# Patient Record
Sex: FEMALE | Race: ASIAN | NOT HISPANIC OR LATINO | URBAN - METROPOLITAN AREA
[De-identification: names, ages, dates, MRNs, and addresses within clinical notes are randomized per-mention and may not be internally consistent; named-entity substitution may affect disease eponyms.]

---

## 2020-10-30 ENCOUNTER — INPATIENT (INPATIENT)
Facility: HOSPITAL | Age: 60
LOS: 4 days | Discharge: HOME | DRG: 375 | End: 2020-11-04
Attending: INTERNAL MEDICINE | Admitting: INTERNAL MEDICINE
Payer: MEDICAID

## 2020-10-30 VITALS
DIASTOLIC BLOOD PRESSURE: 75 MMHG | HEART RATE: 110 BPM | TEMPERATURE: 98 F | SYSTOLIC BLOOD PRESSURE: 120 MMHG | OXYGEN SATURATION: 98 % | WEIGHT: 158.73 LBS | RESPIRATION RATE: 16 BRPM

## 2020-10-30 DIAGNOSIS — C78.6 SECONDARY MALIGNANT NEOPLASM OF RETROPERITONEUM AND PERITONEUM: ICD-10-CM

## 2020-10-30 DIAGNOSIS — Z29.9 ENCOUNTER FOR PROPHYLACTIC MEASURES, UNSPECIFIED: ICD-10-CM

## 2020-10-30 DIAGNOSIS — E03.9 HYPOTHYROIDISM, UNSPECIFIED: ICD-10-CM

## 2020-10-30 DIAGNOSIS — C78.7 SECONDARY MALIGNANT NEOPLASM OF LIVER AND INTRAHEPATIC BILE DUCT: ICD-10-CM

## 2020-10-30 DIAGNOSIS — R18.8 OTHER ASCITES: ICD-10-CM

## 2020-10-30 DIAGNOSIS — I10 ESSENTIAL (PRIMARY) HYPERTENSION: ICD-10-CM

## 2020-10-30 LAB
ACETONE SERPL-MCNC: NEGATIVE — SIGNIFICANT CHANGE UP
ALBUMIN SERPL ELPH-MCNC: 2.8 G/DL — LOW (ref 3.5–5)
ALP SERPL-CCNC: 158 U/L — HIGH (ref 40–120)
ALT FLD-CCNC: 51 U/L DA — SIGNIFICANT CHANGE UP (ref 10–60)
ANION GAP SERPL CALC-SCNC: 6 MMOL/L — SIGNIFICANT CHANGE UP (ref 5–17)
APPEARANCE UR: CLEAR — SIGNIFICANT CHANGE UP
AST SERPL-CCNC: 36 U/L — SIGNIFICANT CHANGE UP (ref 10–40)
BASOPHILS # BLD AUTO: 0.04 K/UL — SIGNIFICANT CHANGE UP (ref 0–0.2)
BASOPHILS NFR BLD AUTO: 0.5 % — SIGNIFICANT CHANGE UP (ref 0–2)
BILIRUB SERPL-MCNC: 0.2 MG/DL — SIGNIFICANT CHANGE UP (ref 0.2–1.2)
BILIRUB UR-MCNC: NEGATIVE — SIGNIFICANT CHANGE UP
BUN SERPL-MCNC: 10 MG/DL — SIGNIFICANT CHANGE UP (ref 7–18)
CALCIUM SERPL-MCNC: 9.1 MG/DL — SIGNIFICANT CHANGE UP (ref 8.4–10.5)
CHLORIDE SERPL-SCNC: 102 MMOL/L — SIGNIFICANT CHANGE UP (ref 96–108)
CO2 SERPL-SCNC: 29 MMOL/L — SIGNIFICANT CHANGE UP (ref 22–31)
COLOR SPEC: YELLOW — SIGNIFICANT CHANGE UP
CREAT SERPL-MCNC: 0.72 MG/DL — SIGNIFICANT CHANGE UP (ref 0.5–1.3)
DIFF PNL FLD: NEGATIVE — SIGNIFICANT CHANGE UP
EOSINOPHIL # BLD AUTO: 0.08 K/UL — SIGNIFICANT CHANGE UP (ref 0–0.5)
EOSINOPHIL NFR BLD AUTO: 1 % — SIGNIFICANT CHANGE UP (ref 0–6)
GLUCOSE SERPL-MCNC: 105 MG/DL — HIGH (ref 70–99)
GLUCOSE UR QL: NEGATIVE — SIGNIFICANT CHANGE UP
HCT VFR BLD CALC: 35.1 % — SIGNIFICANT CHANGE UP (ref 34.5–45)
HGB BLD-MCNC: 11 G/DL — LOW (ref 11.5–15.5)
IMM GRANULOCYTES NFR BLD AUTO: 0.4 % — SIGNIFICANT CHANGE UP (ref 0–1.5)
KETONES UR-MCNC: NEGATIVE — SIGNIFICANT CHANGE UP
LEUKOCYTE ESTERASE UR-ACNC: NEGATIVE — SIGNIFICANT CHANGE UP
LIDOCAIN IGE QN: 127 U/L — SIGNIFICANT CHANGE UP (ref 73–393)
LYMPHOCYTES # BLD AUTO: 1.35 K/UL — SIGNIFICANT CHANGE UP (ref 1–3.3)
LYMPHOCYTES # BLD AUTO: 16.1 % — SIGNIFICANT CHANGE UP (ref 13–44)
MAGNESIUM SERPL-MCNC: 2.6 MG/DL — SIGNIFICANT CHANGE UP (ref 1.6–2.6)
MCHC RBC-ENTMCNC: 28.1 PG — SIGNIFICANT CHANGE UP (ref 27–34)
MCHC RBC-ENTMCNC: 31.3 GM/DL — LOW (ref 32–36)
MCV RBC AUTO: 89.8 FL — SIGNIFICANT CHANGE UP (ref 80–100)
MONOCYTES # BLD AUTO: 0.84 K/UL — SIGNIFICANT CHANGE UP (ref 0–0.9)
MONOCYTES NFR BLD AUTO: 10 % — SIGNIFICANT CHANGE UP (ref 2–14)
NEUTROPHILS # BLD AUTO: 6.05 K/UL — SIGNIFICANT CHANGE UP (ref 1.8–7.4)
NEUTROPHILS NFR BLD AUTO: 72 % — SIGNIFICANT CHANGE UP (ref 43–77)
NITRITE UR-MCNC: NEGATIVE — SIGNIFICANT CHANGE UP
NRBC # BLD: 0 /100 WBCS — SIGNIFICANT CHANGE UP (ref 0–0)
PH UR: 7 — SIGNIFICANT CHANGE UP (ref 5–8)
PLATELET # BLD AUTO: 384 K/UL — SIGNIFICANT CHANGE UP (ref 150–400)
POTASSIUM SERPL-MCNC: 4.1 MMOL/L — SIGNIFICANT CHANGE UP (ref 3.5–5.3)
POTASSIUM SERPL-SCNC: 4.1 MMOL/L — SIGNIFICANT CHANGE UP (ref 3.5–5.3)
PROT SERPL-MCNC: 7.8 G/DL — SIGNIFICANT CHANGE UP (ref 6–8.3)
PROT UR-MCNC: NEGATIVE — SIGNIFICANT CHANGE UP
RBC # BLD: 3.91 M/UL — SIGNIFICANT CHANGE UP (ref 3.8–5.2)
RBC # FLD: 11.7 % — SIGNIFICANT CHANGE UP (ref 10.3–14.5)
SARS-COV-2 RNA SPEC QL NAA+PROBE: SIGNIFICANT CHANGE UP
SODIUM SERPL-SCNC: 137 MMOL/L — SIGNIFICANT CHANGE UP (ref 135–145)
SP GR SPEC: 1 — LOW (ref 1.01–1.02)
TROPONIN I SERPL-MCNC: <0.015 NG/ML — SIGNIFICANT CHANGE UP (ref 0–0.04)
UROBILINOGEN FLD QL: NEGATIVE — SIGNIFICANT CHANGE UP
WBC # BLD: 8.39 K/UL — SIGNIFICANT CHANGE UP (ref 3.8–10.5)
WBC # FLD AUTO: 8.39 K/UL — SIGNIFICANT CHANGE UP (ref 3.8–10.5)

## 2020-10-30 PROCEDURE — 99285 EMERGENCY DEPT VISIT HI MDM: CPT

## 2020-10-30 PROCEDURE — 76705 ECHO EXAM OF ABDOMEN: CPT | Mod: 26

## 2020-10-30 PROCEDURE — 74177 CT ABD & PELVIS W/CONTRAST: CPT | Mod: 26

## 2020-10-30 RX ORDER — ENOXAPARIN SODIUM 100 MG/ML
40 INJECTION SUBCUTANEOUS DAILY
Refills: 0 | Status: DISCONTINUED | OUTPATIENT
Start: 2020-10-30 | End: 2020-11-04

## 2020-10-30 RX ORDER — IOHEXOL 300 MG/ML
30 INJECTION, SOLUTION INTRAVENOUS ONCE
Refills: 0 | Status: COMPLETED | OUTPATIENT
Start: 2020-10-30 | End: 2020-10-30

## 2020-10-30 RX ORDER — SODIUM CHLORIDE 9 MG/ML
1000 INJECTION INTRAMUSCULAR; INTRAVENOUS; SUBCUTANEOUS
Refills: 0 | Status: DISCONTINUED | OUTPATIENT
Start: 2020-10-30 | End: 2020-10-30

## 2020-10-30 RX ORDER — LEVOTHYROXINE SODIUM 125 MCG
25 TABLET ORAL DAILY
Refills: 0 | Status: DISCONTINUED | OUTPATIENT
Start: 2020-10-30 | End: 2020-11-04

## 2020-10-30 RX ORDER — ATORVASTATIN CALCIUM 80 MG/1
20 TABLET, FILM COATED ORAL AT BEDTIME
Refills: 0 | Status: DISCONTINUED | OUTPATIENT
Start: 2020-10-30 | End: 2020-11-04

## 2020-10-30 RX ORDER — ACETAMINOPHEN 500 MG
650 TABLET ORAL EVERY 6 HOURS
Refills: 0 | Status: DISCONTINUED | OUTPATIENT
Start: 2020-10-30 | End: 2020-11-04

## 2020-10-30 RX ORDER — LOSARTAN POTASSIUM 100 MG/1
25 TABLET, FILM COATED ORAL DAILY
Refills: 0 | Status: DISCONTINUED | OUTPATIENT
Start: 2020-10-30 | End: 2020-11-04

## 2020-10-30 RX ORDER — FAMOTIDINE 10 MG/ML
20 INJECTION INTRAVENOUS ONCE
Refills: 0 | Status: COMPLETED | OUTPATIENT
Start: 2020-10-30 | End: 2020-10-30

## 2020-10-30 RX ADMIN — IOHEXOL 30 MILLILITER(S): 300 INJECTION, SOLUTION INTRAVENOUS at 14:10

## 2020-10-30 RX ADMIN — ATORVASTATIN CALCIUM 20 MILLIGRAM(S): 80 TABLET, FILM COATED ORAL at 22:45

## 2020-10-30 RX ADMIN — SODIUM CHLORIDE 150 MILLILITER(S): 9 INJECTION INTRAMUSCULAR; INTRAVENOUS; SUBCUTANEOUS at 14:10

## 2020-10-30 RX ADMIN — FAMOTIDINE 20 MILLIGRAM(S): 10 INJECTION INTRAVENOUS at 14:09

## 2020-10-30 NOTE — CONSULT NOTE ADULT - SUBJECTIVE AND OBJECTIVE BOX
pt 1st language [place]: per [place]  off hours for [place]   pt reports her 2nd languate is Quentin: Quentin  #[id number]    pt 59yo admitted for abd pain  gyn consult called due to the abd ct findings    gyn hx   menarche age 12  menopause age 46  pt denies any post menopausal bleeding episodes after menopause  last mammo 2018 normal  pt denies having colonoscopy done  pt reports she had something done vaginally in the office not sure if it was pap smear in 2018 and reports to have normal result    obhx  [id number]    PAST MEDICAL & SURGICAL HISTORY:  Hypothyroid    Hypercholesterolemia    HTN (hypertension)    No significant past surgical history      pt alert and oriented x3  not in acute distress  abd noted to be distended no guarding no rebound no tenderness on deep palpation  pelvic: no vag bleeding noted                          11.0   8.39  )-----------( 384      ( 30 Oct 2020 12:43 )             35.1   10-30    137  |  102  |  10  ----------------------------<  105<H>  4.1   |  29  |  0.72    Ca    9.1      30 Oct 2020 12:43  Mg     2.6     10-30    TPro  7.8  /  Alb  2.8<L>  /  TBili  0.2  /  DBili  x   /  AST  36  /  ALT  51  /  AlkPhos  158<H>  10-30  < from: CT Abdomen and Pelvis w/ Oral Cont and w/ IV Cont (10.30.20 @ 15:51) >  CT of the Abdomen and Pelvis was performed with intravenous contrast.  Intravenous contrast: 90 ml Omnipaque 350. 10 ml discarded.  Oral contrast: positive contrast was administered.  Sagittal and coronal reformats were performed.    FINDINGS:  LOWER CHEST: Mild right lower lobe discoid atelectasis. Prominent right epicardial lymph nodes measuring up to 6 mm in short axis (2:23).    LIVER: Multiple peripheral hypodensities along the lateral and inferior hepatic surface, likely serosal implants. 1.1 cm hypodense mass in segment 3, indeterminate (2:26). Soft tissue nodule medial to the right hepatic lobe, either serosal implant or mesenteric implant (2:47). Left posterior lower quadrant mesenteric mass (2:102), measuring 2.2 x 1.3 cm.  BILE DUCTS: Normal caliber.  GALLBLADDER: Within normal limits.  SPLEEN: Within normal limits.  PANCREAS: Within normal limits.  ADRENALS: Within normal limits.  KIDNEYS/URETERS: Within normal limits.    BLADDER: Within normal limits.  REPRODUCTIVE ORGANS: Unremarkable uterus. Heterogeneous ill-defined left adnexal mass measuring 4.8 x 3.5 cm (2:127), suspicious for malignancy.    BOWEL: No bowel obstruction. Appendix is not visualized and cannot be assessed.  PERITONEUM: Omental carcinomatosis with innumerable omental nodules and fluid stranding conglomerate mass inferior to the liver measuring 3.2 x 4.6 x 8.0 cm consistent with mesenteric or omental implants (2:72 and 4:51). Other adjacent masses are present as well. Confluent omental masses in the lower anterior abdomen (2:108). Mesenteric implant anterior to the pancreas measuring 1.2 cm in diameter (2:44). Moderate ascites.  VESSELS: Within normal limits.  RETROPERITONEUM/LYMPH NODES: No lymphadenopathy.  ABDOMINAL WALL: Within normal limits.  BONES: Mild degenerative changes.    IMPRESSION:  Extensive omental carcinomatosis and moderate ascites. Favor a left ovarian primary malignancy as above. Recommend follow-up pelvic ultrasound. Pelvic MRI may also be of diagnostic benefit.    Dominant conglomerate mass inferior to the liver consistent with omental or mesenteric implants. Conglomerate omental mass in the lower abdomen as well.    Hepatic serosal implants.    Mesenteric implants as above.    Prominent epicardial lymph nodes.    Small hepatic mass indeterminant for parenchymal hepatic metastasis.

## 2020-10-30 NOTE — H&P ADULT - ATTENDING COMMENTS
PATIENT SEEN AND EXAMINED. CASE D/W ER MD, DAUGHTER AND RESIDENT TEAM.    HPI:  Patient, a 60 year old female with PMH of HTN, HLD, hypothyroidism presents to the ED with abdominal pain and distension for 2 weeks. She came from [place] 2y ago and reports upper abdominal pain and right shoulder pain that has been going intermittently for 2 weeks and worsening. It is associated with nausea but no vomiting, no loss of appetite or weight changed. Patient says her abdomen feels bloated. Denies fevers, loss of appetite, weight changes, night sweats, blood in stools. Endorses feeling weak and cold in the feet. According to daughter, she is independent, has no SOB and walks 1-2 miles every day. Patient never had EGD, colonoscopy. She went to  and got CXR and abdominal X-ray that were normal but was sent to ED since she was tachycardiac.    In ED, pt is in NAD. She received 20mg pepcid.    Vital Signs Last 24 Hrs  T(C): 36.9 (30 Oct 2020 15:34), Max: 36.9 (30 Oct 2020 15:34)  T(F): 98.5 (30 Oct 2020 15:34), Max: 98.5 (30 Oct 2020 15:34)  HR: 99 (30 Oct 2020 15:34) (99 - 110)  BP: 122/73 (30 Oct 2020 15:34) (120/75 - 122/73)  BP(mean): --  RR: 17 (30 Oct 2020 15:34) (16 - 17)  SpO2: 98% (30 Oct 2020 15:34) (98% - 98%)    GOC: Discussed with daughter, not ready to make a decision at this time. Will discuss with sister. Should be readdressed late. FULL CODE FOR NOW.    PLAN:  # SUSPECTED PERITONEAL CARCINOMATOSIS SUSPECTED TO BE S/T PRIMARY LEFT OVARIAN CA W/ METASTASIS TO LIVER AND MESENTERY - F/U TVUS, F/U CT CHEST, F/U  & CEA, ONCOLOGY CONSULT IN PROGRESS, GYNECOLOGY CONSULT IN PROGRESS, GYN/ONC CONSULT TO BE PLACED IN A.M.  # CHOLELITHIASIS  # HTN  # HYPOTHYROIDISM  # GI AND DVT PPX    TANIKA PIERCE M.D. COVERING BRIAN PIERCE M.D.

## 2020-10-30 NOTE — ED ADULT TRIAGE NOTE - CHIEF COMPLAINT QUOTE
on and off abdominal pain, bloating and bach pain for 2 weeks. right shoulder pain for 2 weeks after carrying vegetable.

## 2020-10-30 NOTE — H&P ADULT - PROBLEM SELECTOR PLAN 2
pt found to have ascites on CT and USG  likely due to malignancy   in that case, no efficacy of lasix or tap so hold for now   f/u heme onc Dr Kyle

## 2020-10-30 NOTE — ED PROVIDER NOTE - CARE PLAN
Principal Discharge DX:	Carcinomatosis peritonei  Secondary Diagnosis:	Ascites   Principal Discharge DX:	Carcinomatosis peritonei  Secondary Diagnosis:	Ascites  Secondary Diagnosis:	Tachycardia   Principal Discharge DX:	Carcinomatosis peritonei  Secondary Diagnosis:	Ascites  Secondary Diagnosis:	Tachycardia  Secondary Diagnosis:	Ovarian cancer

## 2020-10-30 NOTE — ED PROVIDER NOTE - CLINICAL SUMMARY MEDICAL DECISION MAKING FREE TEXT BOX
Rt upper chest pain, abd bloating, concern for [place], will get labs, sono, give meds.  Pt also with tachycardia, will get TFT, reassess

## 2020-10-30 NOTE — ED PROVIDER NOTE - ENMT, MLM
Airway patent, Nasal mucosa clear. Mouth with normal mucosa. Throat has no vesicles, no oropharyngeal exudates and uvula is midline. Airway patent, Nasal mucosa clear. Mouth with normal mucosa. Throat has no vesicles, no oropharyngeal exudates and uvula is midline.  Rt thyroid nodule palp.,

## 2020-10-30 NOTE — ED PROVIDER NOTE - PROGRESS NOTE DETAILS
pt with peritoneal carcinomatosis, pt with peritoneal carcinomatosis, ascites, tachycardia, will admit pt case d/w Dr. Burgess

## 2020-10-30 NOTE — H&P ADULT - PROBLEM SELECTOR PLAN 3
CT also showed Hepatic serosal implants, small hepatic mass indeterminant for parenchymal hepatic metastasis.  Dr Kyle consulted

## 2020-10-30 NOTE — H&P ADULT - NSHPPHYSICALEXAM_GEN_ALL_CORE
General - NAD, sitting up in bed, well groomed  Eyes - PERRLA, EOM intact  ENT - Nonicteric sclerae, PERRLA, EOMI. Oropharynx clear. Moist mucous membranes. Conjunctivae appear well perfused.   Neck - No noticeable or palpable swelling, redness or rash around throat or on face  Lymph Nodes - No lymphadenopathy  Cardiovascular - RRR no m/r/g, no JVD, no carotid bruits  Lungs - Clear to auscltation, no use of acessory muscles, no crackles or wheezes.  Skin - No rashes, skin warm and dry, no erythematous areas  Breast -  Psychiatry -  Abdomen - Normal bowel sounds, abdomen soft and nontender  Genito Urinary – Genital exam not performed since complaints not related.  Rectal – Rectal exam not performed since no symptoms indicated blood loss.  Extremeties - No edema, cyanosis or clubbing  Musculo Skeletal - 5/5 strength, normal range of motion, no swollen or erythematous  joints.  Neurological – Alert and oriented x 3, CN 2-12 grossly intact. General - NAD, sitting up in bed, well groomed  Eyes - PERRLA, EOM intact  ENT - Nonicteric sclerae, PERRLA, EOMI. Oropharynx clear. Moist mucous membranes. Conjunctivae appear well perfused.   Neck - No noticeable or palpable swelling, redness or rash around throat or on face  Cardiovascular - RRR no m/r/g, no JVD, no carotid bruits  Lungs - Clear to auscultation, no use of accessory muscles, no crackles or wheezes.  Skin - No rashes, skin warm and dry, no erythematous areas  Abdomen - Normal bowel sounds, abdomen distended, no tenderness but abdominal discomfort like pressure.  Extremities - No edema, cyanosis or clubbing  Musculo Skeletal - 5/5 strength, normal range of motion, no swollen or erythematous  joints.  Neurological – Alert and oriented x 3, CN 2-12 grossly intact.

## 2020-10-30 NOTE — H&P ADULT - ASSESSMENT
Patient, a 60 year old female with PMH of HTN, HLD, hypothyroidism presents to the ED with abdominal pain and distension for 2 weeks. USG liver showed cholelithiasis, no biliary ductal dilatation, mild to moderate ascites. CT abd showed extensive omental carcinomatosis and moderate ascites favoring a left ovarian primary malignancy, dominant conglomerate mass inferior to the liver consistent with omental or mesenteric implants.Hepatic serosal implants, small hepatic mass indeterminant for parenchymal hepatic metastasis.

## 2020-10-30 NOTE — CHART NOTE - NSCHARTNOTEFT_GEN_A_CORE
EVENT: Temp spike 38.2    Brief HPI: 60 year old female with PMH of HTN, HLD, hypothyroidism presents to the ED with abdominal pain and distension for 2 weeks. She came from [place] 2y ago and reports upper abdominal pain and right shoulder pain that has been going intermittently for 2 weeks and worsening. It is associated with nausea but no vomiting, no loss of appetite or weight changed. Patient says her abdomen feels bloated.      OBJECTIVE:  Vital Signs Last 24 Hrs  T(C): 38.2 (30 Oct 2020 21:22), Max: 38.2 (30 Oct 2020 21:22)  T(F): 100.8 (30 Oct 2020 21:22), Max: 100.8 (30 Oct 2020 21:22)  HR: 110 (30 Oct 2020 21:22) (99 - 110)  BP: 123/62 (30 Oct 2020 21:22) (119/76 - 123/62)  BP(mean): --  RR: 18 (30 Oct 2020 21:22) (16 - 18)  SpO2: 100% (30 Oct 2020 21:22) (96% - 100%)    FOCUSED PHYSICAL EXAM:    LABS:                        11.0   8.39  )-----------( 384      ( 30 Oct 2020 12:43 )             35.1   CARDIAC MARKERS ( 30 Oct 2020 12:43 )  <0.015 ng/mL / x     / x     / x     / x        10-30    137  |  102  |  10  ----------------------------<  105<H>  4.1   |  29  |  0.72    Ca    9.1      30 Oct 2020 12:43  Mg     2.6     10-30    TPro  7.8  /  Alb  2.8<L>  /  TBili  0.2  /  DBili  x   /  AST  36  /  ALT  51  /  AlkPhos  158<H>  10-30      EKG:   IMGAGING:    ASSESSMENT:    HPI: 60 year old female with PMH of HTN, HLD, hypothyroidism presents to the ED with abdominal pain and distension for 2 weeks. USG liver showed cholelithiasis, no biliary ductal dilatation, mild to moderate ascites. CT abd showed extensive omental carcinomatosis and moderate ascites favoring a left ovarian primary malignancy, dominant conglomerate mass inferior to the liver consistent with omental or mesenteric implants.Hepatic serosal implants, small hepatic mass indeterminant for parenchymal hepatic metastasis. Gyn-onc consult and Dr. Kyle consulted. Admitted to 6S, now spiking fevers.                    Patient, a 60 year old female with PMH of HTN, HLD, hypothyroidism presents to the ED with abdominal pain and distension for 2 weeks. She came from [place] 2y ago and reports upper abdominal pain and right shoulder pain that has been going intermittently for 2 weeks and worsening. It is associated with nausea but no vomiting, no loss of appetite or weight changed. Patient says her abdomen feels bloated. Denies fevers, loss of appetite, weight changes, night sweats, blood in stools. Endorses feeling weak and cold in the feet. According to daughter, she is independent, has no SOB and walks 1-2 miles every day. Patient never had EGD, colonoscopy. She went to  and got CXR and abdominal X-ray that were normal but was sent to ED since she was tachycardiac.    In ED, pt is in NAD. She received 20mg pepcid.    Vital Signs Last 24 Hrs  T(C): 36.9 (30 Oct 2020 15:34), Max: 36.9 (30 Oct 2020 15:34)  T(F): 98.5 (30 Oct 2020 15:34), Max: 98.5 (30 Oct 2020 15:34)  HR: 99 (30 Oct 2020 15:34) (99 - 110)  BP: 122/73 (30 Oct 2020 15:34) (120/75 - 122/73)  BP(mean): --  RR: 17 (30 Oct 2020 15:34) (16 - 17)  SpO2: 98% (30 Oct 2020 15:34) (98% - 98%)    GOC: Discussed with daughter, not ready to make a decision at this time. Will discuss with sister. Should be readdressed late. FULL CODE FOR NOW. (30 Oct 2020 18:01)      PLAN:     FOLLOW UP / RESULT:  # [id number]  EVENT: Temp spike 38.2    Brief HPI: 60 year old female with PMH of HTN, HLD, hypothyroidism presents to the ED with abdominal pain and distension for 2 weeks. She came from [place] 2y ago and reports upper abdominal pain and right shoulder pain that has been going intermittently for 2 weeks and worsening. It is associated with nausea but no vomiting, no loss of appetite or weight changed. Patient says her abdomen feels bloated.    OBJECTIVE:  Vital Signs Last 24 Hrs  T(C): 38.2 (30 Oct 2020 21:22), Max: 38.2 (30 Oct 2020 21:22)  T(F): 100.8 (30 Oct 2020 21:22), Max: 100.8 (30 Oct 2020 21:22)  HR: 110 (30 Oct 2020 21:22) (99 - 110)  BP: 123/62 (30 Oct 2020 21:22) (119/76 - 123/62)  BP(mean): --  RR: 18 (30 Oct 2020 21:22) (16 - 18)  SpO2: 100% (30 Oct 2020 21:22) (96% - 100%)    FOCUSED PHYSICAL EXAM:  NEURO: Alert oriented X 4   RESP: Unlabored, symmetrical movement  CV: Sinus tach, regular  ABD: Rounded, mildly distended  LABS:                        11.0   8.39  )-----------( 384      ( 30 Oct 2020 12:43 )             35.1   CARDIAC MARKERS ( 30 Oct 2020 12:43 )  <0.015 ng/mL / x     / x     / x     / x        10-30    137  |  102  |  10  ----------------------------<  105<H>  4.1   |  29  |  0.72    Ca    9.1      30 Oct 2020 12:43  Mg     2.6     10-30    TPro  7.8  /  Alb  2.8<L>  /  TBili  0.2  /  DBili  x   /  AST  36  /  ALT  51  /  AlkPhos  158<H>  10-30    IMAGING:  EXAM:  CT ABDOMEN AND PELVIS OC IC                        PROCEDURE DATE:  10/30/2020    INTERPRETATION:  CLINICAL INDICATION: 60 years  Female with diffuse abd bloating, early satiety.  COMPARISON: None.  PROCEDURE:  CT of the Abdomen and Pelvis was performed with intravenous contrast.  Intravenous contrast: 90 ml Omnipaque 350. 10 ml discarded.  Oral contrast: positive contrast was administered.  Sagittal and coronal reformats were performed.  FINDINGS:  LOWER CHEST: Mild right lower lobe discoid atelectasis. Prominent right epicardial lymph nodes measuring up to 6 mm in short axis (2:23).  LIVER: Multiple peripheral hypodensities along the lateral and inferior hepatic surface, likely serosal implants. 1.1 cm hypodense mass in segment 3, indeterminate (2:26). Soft tissue nodule medial to the right hepatic lobe, either serosal implant or mesenteric implant (2:47). Left posterior lower quadrant mesenteric mass (2:102), measuring 2.2 x 1.3 cm.  BILE DUCTS: Normal caliber.  GALLBLADDER: Within normal limits.  SPLEEN: Within normal limits.  PANCREAS: Within normal limits.  ADRENALS: Within normal limits.  KIDNEYS/URETERS: Within normal limits.  BLADDER: Within normal limits.  REPRODUCTIVE ORGANS: Unremarkable uterus. Heterogeneous ill-defined left adnexal mass measuring 4.8 x 3.5 cm (2:127), suspicious for malignancy.  BOWEL: No bowel obstruction. Appendix is not visualized and cannot be assessed.  PERITONEUM: Omental carcinomatosis with innumerable omental nodules and fluid stranding conglomerate mass inferior to the liver measuring 3.2 x 4.6 x 8.0 cm consistent with mesenteric or omental implants (2:72 and 4:51). Other adjacent masses are present as well. Confluent omental masses in the lower anterior abdomen (2:108). Mesenteric implant anterior to the pancreas measuring 1.2 cm in diameter (2:44). Moderate ascites.  VESSELS: Within normal limits.  RETROPERITONEUM/LYMPH NODES: No lymphadenopathy.  ABDOMINAL WALL: Within normal limits.  BONES: Mild degenerative changes.  IMPRESSION:  Extensive omental carcinomatosis and moderate ascites. Favor a left ovarian primary malignancy as above. Recommend follow-up pelvic ultrasound. Pelvic MRI may also be of diagnostic benefit.  Dominant conglomerate mass inferior to the liver consistent with omental or mesenteric implants. Conglomerate omental mass in the lower abdomen as well.  Hepatic serosal implants.  Mesenteric implants as above.  Prominent epicardial lymph nodes.  Small hepatic mass indeterminant for parenchymal hepatic metastasis.    ASSESSMENT:  60 year old female with PMH of HTN, HLD, hypothyroidism presents to the ED with abdominal pain and distension for 2 weeks. Hepatic serosal implants, small hepatic mass indeterminant for parenchymal hepatic metastasis. Gyn-onc and Dr. Kyle consulted. Admitted to 6S, now spiking fevers.    PROBLEM: Temp spike 38.2 probably due to extensive omental carcinomatosis and moderate ascites vs environmental factor.  PLAN:   1. Blood culture X1  2. Acetaminophen  Tablet 650 yvon GRAM(s) Oral every 6 hours PRN Temp greater or equal to 38C (100.4F)    FOLLOW UP / RESULT: Monitor temp trend, f/u blood culture  # [id number] (Quentin)  EVENT: Temp spike 38.2    Brief HPI: 60 year old female with PMH of HTN, HLD, hypothyroidism presents to the ED with abdominal pain and distension for 2 weeks. She came from [place] 2y ago and reports upper abdominal pain and right shoulder pain that has been going intermittently for 2 weeks and worsening. It is associated with nausea but no vomiting, no loss of appetite or weight changed. Patient says her abdomen feels bloated.    OBJECTIVE:  Vital Signs Last 24 Hrs  T(C): 38.2 (30 Oct 2020 21:22), Max: 38.2 (30 Oct 2020 21:22)  T(F): 100.8 (30 Oct 2020 21:22), Max: 100.8 (30 Oct 2020 21:22)  HR: 110 (30 Oct 2020 21:22) (99 - 110)  BP: 123/62 (30 Oct 2020 21:22) (119/76 - 123/62)  BP(mean): --  RR: 18 (30 Oct 2020 21:22) (16 - 18)  SpO2: 100% (30 Oct 2020 21:22) (96% - 100%)    FOCUSED PHYSICAL EXAM:  NEURO: Alert oriented X 4   RESP: Unlabored, symmetrical movement  CV: Sinus tach, regular  ABD: Rounded, mildly distended  LABS:                        11.0   8.39  )-----------( 384      ( 30 Oct 2020 12:43 )             35.1   CARDIAC MARKERS ( 30 Oct 2020 12:43 )  <0.015 ng/mL / x     / x     / x     / x        10-30    137  |  102  |  10  ----------------------------<  105<H>  4.1   |  29  |  0.72    Ca    9.1      30 Oct 2020 12:43  Mg     2.6     10-30    TPro  7.8  /  Alb  2.8<L>  /  TBili  0.2  /  DBili  x   /  AST  36  /  ALT  51  /  AlkPhos  158<H>  10-30    IMAGING:  EXAM:  CT ABDOMEN AND PELVIS OC IC                        PROCEDURE DATE:  10/30/2020    INTERPRETATION:  CLINICAL INDICATION: 60 years  Female with diffuse abd bloating, early satiety.  COMPARISON: None.  PROCEDURE:  CT of the Abdomen and Pelvis was performed with intravenous contrast.  Intravenous contrast: 90 ml Omnipaque 350. 10 ml discarded.  Oral contrast: positive contrast was administered.  Sagittal and coronal reformats were performed.  FINDINGS:  LOWER CHEST: Mild right lower lobe discoid atelectasis. Prominent right epicardial lymph nodes measuring up to 6 mm in short axis (2:23).  LIVER: Multiple peripheral hypodensities along the lateral and inferior hepatic surface, likely serosal implants. 1.1 cm hypodense mass in segment 3, indeterminate (2:26). Soft tissue nodule medial to the right hepatic lobe, either serosal implant or mesenteric implant (2:47). Left posterior lower quadrant mesenteric mass (2:102), measuring 2.2 x 1.3 cm.  BILE DUCTS: Normal caliber.  GALLBLADDER: Within normal limits.  SPLEEN: Within normal limits.  PANCREAS: Within normal limits.  ADRENALS: Within normal limits.  KIDNEYS/URETERS: Within normal limits.  BLADDER: Within normal limits.  REPRODUCTIVE ORGANS: Unremarkable uterus. Heterogeneous ill-defined left adnexal mass measuring 4.8 x 3.5 cm (2:127), suspicious for malignancy.  BOWEL: No bowel obstruction. Appendix is not visualized and cannot be assessed.  PERITONEUM: Omental carcinomatosis with innumerable omental nodules and fluid stranding conglomerate mass inferior to the liver measuring 3.2 x 4.6 x 8.0 cm consistent with mesenteric or omental implants (2:72 and 4:51). Other adjacent masses are present as well. Confluent omental masses in the lower anterior abdomen (2:108). Mesenteric implant anterior to the pancreas measuring 1.2 cm in diameter (2:44). Moderate ascites.  VESSELS: Within normal limits.  RETROPERITONEUM/LYMPH NODES: No lymphadenopathy.  ABDOMINAL WALL: Within normal limits.  BONES: Mild degenerative changes.  IMPRESSION:  Extensive omental carcinomatosis and moderate ascites. Favor a left ovarian primary malignancy as above. Recommend follow-up pelvic ultrasound. Pelvic MRI may also be of diagnostic benefit.  Dominant conglomerate mass inferior to the liver consistent with omental or mesenteric implants. Conglomerate omental mass in the lower abdomen as well.  Hepatic serosal implants.  Mesenteric implants as above.  Prominent epicardial lymph nodes.  Small hepatic mass indeterminant for parenchymal hepatic metastasis.    ASSESSMENT:  60 year old female with PMH of HTN, HLD, hypothyroidism presents to the ED with abdominal pain and distension for 2 weeks. Hepatic serosal implants, small hepatic mass indeterminant for parenchymal hepatic metastasis. Gyn-onc and Dr. Kyle consulted. Admitted to 6S, now spiking fevers.    PROBLEM: Temp spike 38.2 probably due to extensive omental carcinomatosis and moderate ascites vs environmental factor.  PLAN:   1. Blood culture X1  2. Acetaminophen  Tablet 650 yvon GRAM(s) Oral every 6 hours PRN Temp greater or equal to 38C (100.4F)    FOLLOW UP / RESULT: Monitor temp trend, f/u blood culture

## 2020-10-30 NOTE — ED ADULT NURSE NOTE - OBJECTIVE STATEMENT
Pt AOx4, ambulatory, c/o Chest pain, abdominal bloating, and right shoulder pain x 2 weeks, Pt denies SOB, n/v/f, hematuria, dysuria.

## 2020-10-30 NOTE — H&P ADULT - HISTORY OF PRESENT ILLNESS
Patient, a 60 year old female with PMH of HTN, HLD, hypothyroidism presents to the ED with abdominal pain and distension for 2 weeks. She came from [place] 2y ago and reports upper abdominal pain and right shoulder pain that has been going intermittently for 2 weeks and worsening. It is associated with nausea but no vomiting, no loss of appetite or weight changed. Patient says her abdomen feels bloated. Denies fevers, loss of appetite, weight changes, night sweats, blood in stools. Endorses feeling weak and cold in the feet. According to daughter, she is independent, has no SOB and walks 1-2 miles every day. Patient never had EGD, colonoscopy. She went to  and got CXR and abdominal X-ray that were normal but was sent to ED since she was tachycardiac.    In ED, pt is in NAD. She received 20mg pepcid.    Vital Signs Last 24 Hrs  T(C): 36.9 (30 Oct 2020 15:34), Max: 36.9 (30 Oct 2020 15:34)  T(F): 98.5 (30 Oct 2020 15:34), Max: 98.5 (30 Oct 2020 15:34)  HR: 99 (30 Oct 2020 15:34) (99 - 110)  BP: 122/73 (30 Oct 2020 15:34) (120/75 - 122/73)  BP(mean): --  RR: 17 (30 Oct 2020 15:34) (16 - 17)  SpO2: 98% (30 Oct 2020 15:34) (98% - 98%) Patient, a 60 year old female with PMH of HTN, HLD, hypothyroidism presents to the ED with abdominal pain and distension for 2 weeks. She came from [place] 2y ago and reports upper abdominal pain and right shoulder pain that has been going intermittently for 2 weeks and worsening. It is associated with nausea but no vomiting, no loss of appetite or weight changed. Patient says her abdomen feels bloated. Denies fevers, loss of appetite, weight changes, night sweats, blood in stools. Endorses feeling weak and cold in the feet. According to daughter, she is independent, has no SOB and walks 1-2 miles every day. Patient never had EGD, colonoscopy. She went to  and got CXR and abdominal X-ray that were normal but was sent to ED since she was tachycardiac.    In ED, pt is in NAD. She received 20mg pepcid.    Vital Signs Last 24 Hrs  T(C): 36.9 (30 Oct 2020 15:34), Max: 36.9 (30 Oct 2020 15:34)  T(F): 98.5 (30 Oct 2020 15:34), Max: 98.5 (30 Oct 2020 15:34)  HR: 99 (30 Oct 2020 15:34) (99 - 110)  BP: 122/73 (30 Oct 2020 15:34) (120/75 - 122/73)  BP(mean): --  RR: 17 (30 Oct 2020 15:34) (16 - 17)  SpO2: 98% (30 Oct 2020 15:34) (98% - 98%)    GOC: Discussed with daughter, not ready to make a decision at this time. Will discuss with sister. Should be readdressed late. FULL CODE FOR NOW.

## 2020-10-30 NOTE — H&P ADULT - PROBLEM SELECTOR PLAN 1
p/w abd pain and distension x 2 weeks   pt has no prior h/o cancer, fam history unknown  CT scan showed extensive omental carcinomatosis, left adnexal mass and small hepatic mass   concerning for ovarian malignancy with mets to liver  f/u CT chest   f/u TVUS   obgyn consulted  get Gyn-onc consult in am (Dr. Concepcion-unable to reach right now)  Dr. Kyle consulted  f/u tumor markers , CEA

## 2020-10-30 NOTE — ED PROVIDER NOTE - OBJECTIVE STATEMENT
60 y.o. female c/o on & off Rt upper chest for past 2 weeks, abd bloating radiated to mid back, nausea, no vomiting, pt with increasing belching, with some relief, last BM this am, no BRBPR, never had EGD, colonoscopy.  No coughing, dysuria, pt with early satiety.  Pt was seen in UC on Wednesday, CXR, AXR-neg, pt was found to be tachycardia. 60 y.o. female c/o on & off Rt upper chest discomfort for past 2 weeks, abd bloating radiated to mid back, nausea, no vomiting, pt with increasing belching, with some relief, last BM this am, no BRBPR, never had EGD, colonoscopy.  No coughing, dysuria, pt with early satiety.  Pt was seen in UC on Wednesday, CXR, AXR-neg, pt was found to be tachycardia.

## 2020-10-31 LAB
A1C WITH ESTIMATED AVERAGE GLUCOSE RESULT: 5.8 % — HIGH (ref 4–5.6)
ALBUMIN SERPL ELPH-MCNC: 2.6 G/DL — LOW (ref 3.5–5)
ALP SERPL-CCNC: 159 U/L — HIGH (ref 40–120)
ALT FLD-CCNC: 44 U/L DA — SIGNIFICANT CHANGE UP (ref 10–60)
ANION GAP SERPL CALC-SCNC: 4 MMOL/L — LOW (ref 5–17)
AST SERPL-CCNC: 30 U/L — SIGNIFICANT CHANGE UP (ref 10–40)
BASOPHILS # BLD AUTO: 0.05 K/UL — SIGNIFICANT CHANGE UP (ref 0–0.2)
BASOPHILS NFR BLD AUTO: 0.7 % — SIGNIFICANT CHANGE UP (ref 0–2)
BILIRUB SERPL-MCNC: 0.5 MG/DL — SIGNIFICANT CHANGE UP (ref 0.2–1.2)
BUN SERPL-MCNC: 8 MG/DL — SIGNIFICANT CHANGE UP (ref 7–18)
CALCIUM SERPL-MCNC: 8.5 MG/DL — SIGNIFICANT CHANGE UP (ref 8.4–10.5)
CANCER AG125 SERPL-ACNC: 652 U/ML — HIGH
CEA SERPL-MCNC: 122 NG/ML — HIGH (ref 0–3.8)
CHLORIDE SERPL-SCNC: 105 MMOL/L — SIGNIFICANT CHANGE UP (ref 96–108)
CHOLEST SERPL-MCNC: 105 MG/DL — SIGNIFICANT CHANGE UP
CO2 SERPL-SCNC: 27 MMOL/L — SIGNIFICANT CHANGE UP (ref 22–31)
CREAT SERPL-MCNC: 0.68 MG/DL — SIGNIFICANT CHANGE UP (ref 0.5–1.3)
EOSINOPHIL # BLD AUTO: 0.08 K/UL — SIGNIFICANT CHANGE UP (ref 0–0.5)
EOSINOPHIL NFR BLD AUTO: 1.1 % — SIGNIFICANT CHANGE UP (ref 0–6)
ESTIMATED AVERAGE GLUCOSE: 120 MG/DL — HIGH (ref 68–114)
FOLATE SERPL-MCNC: 15.8 NG/ML — SIGNIFICANT CHANGE UP
GLUCOSE SERPL-MCNC: 91 MG/DL — SIGNIFICANT CHANGE UP (ref 70–99)
HCT VFR BLD CALC: 31.5 % — LOW (ref 34.5–45)
HCV AB S/CO SERPL IA: 0.1 S/CO — SIGNIFICANT CHANGE UP (ref 0–0.99)
HCV AB SERPL-IMP: SIGNIFICANT CHANGE UP
HDLC SERPL-MCNC: 32 MG/DL — LOW
HGB BLD-MCNC: 10.1 G/DL — LOW (ref 11.5–15.5)
IMM GRANULOCYTES NFR BLD AUTO: 0.6 % — SIGNIFICANT CHANGE UP (ref 0–1.5)
LIPID PNL WITH DIRECT LDL SERPL: 55 MG/DL — SIGNIFICANT CHANGE UP
LYMPHOCYTES # BLD AUTO: 1.06 K/UL — SIGNIFICANT CHANGE UP (ref 1–3.3)
LYMPHOCYTES # BLD AUTO: 14.6 % — SIGNIFICANT CHANGE UP (ref 13–44)
MAGNESIUM SERPL-MCNC: 2.3 MG/DL — SIGNIFICANT CHANGE UP (ref 1.6–2.6)
MCHC RBC-ENTMCNC: 28.6 PG — SIGNIFICANT CHANGE UP (ref 27–34)
MCHC RBC-ENTMCNC: 32.1 GM/DL — SIGNIFICANT CHANGE UP (ref 32–36)
MCV RBC AUTO: 89.2 FL — SIGNIFICANT CHANGE UP (ref 80–100)
MONOCYTES # BLD AUTO: 0.65 K/UL — SIGNIFICANT CHANGE UP (ref 0–0.9)
MONOCYTES NFR BLD AUTO: 9 % — SIGNIFICANT CHANGE UP (ref 2–14)
NEUTROPHILS # BLD AUTO: 5.38 K/UL — SIGNIFICANT CHANGE UP (ref 1.8–7.4)
NEUTROPHILS NFR BLD AUTO: 74 % — SIGNIFICANT CHANGE UP (ref 43–77)
NON HDL CHOLESTEROL: 73 MG/DL — SIGNIFICANT CHANGE UP
NRBC # BLD: 0 /100 WBCS — SIGNIFICANT CHANGE UP (ref 0–0)
PHOSPHATE SERPL-MCNC: 3.2 MG/DL — SIGNIFICANT CHANGE UP (ref 2.5–4.5)
PLATELET # BLD AUTO: 357 K/UL — SIGNIFICANT CHANGE UP (ref 150–400)
POTASSIUM SERPL-MCNC: 3.9 MMOL/L — SIGNIFICANT CHANGE UP (ref 3.5–5.3)
POTASSIUM SERPL-SCNC: 3.9 MMOL/L — SIGNIFICANT CHANGE UP (ref 3.5–5.3)
PROT SERPL-MCNC: 7 G/DL — SIGNIFICANT CHANGE UP (ref 6–8.3)
RBC # BLD: 3.53 M/UL — LOW (ref 3.8–5.2)
RBC # FLD: 12 % — SIGNIFICANT CHANGE UP (ref 10.3–14.5)
SODIUM SERPL-SCNC: 136 MMOL/L — SIGNIFICANT CHANGE UP (ref 135–145)
T3 SERPL-MCNC: 85 NG/DL — SIGNIFICANT CHANGE UP (ref 80–200)
TRIGL SERPL-MCNC: 91 MG/DL — SIGNIFICANT CHANGE UP
TSH SERPL-MCNC: 4.13 UU/ML — SIGNIFICANT CHANGE UP (ref 0.34–4.82)
VIT B12 SERPL-MCNC: 414 PG/ML — SIGNIFICANT CHANGE UP (ref 232–1245)
WBC # BLD: 7.26 K/UL — SIGNIFICANT CHANGE UP (ref 3.8–10.5)
WBC # FLD AUTO: 7.26 K/UL — SIGNIFICANT CHANGE UP (ref 3.8–10.5)

## 2020-10-31 PROCEDURE — 76830 TRANSVAGINAL US NON-OB: CPT | Mod: 26

## 2020-10-31 PROCEDURE — 76856 US EXAM PELVIC COMPLETE: CPT | Mod: 26

## 2020-10-31 PROCEDURE — 71250 CT THORAX DX C-: CPT | Mod: 26

## 2020-10-31 RX ADMIN — ENOXAPARIN SODIUM 40 MILLIGRAM(S): 100 INJECTION SUBCUTANEOUS at 12:17

## 2020-10-31 RX ADMIN — Medication 25 MICROGRAM(S): at 05:19

## 2020-10-31 RX ADMIN — ATORVASTATIN CALCIUM 20 MILLIGRAM(S): 80 TABLET, FILM COATED ORAL at 22:01

## 2020-10-31 NOTE — CONSULT NOTE ADULT - SUBJECTIVE AND OBJECTIVE BOX
HPI:    60 year old female with PMH of HTN, HLD, hypothyroidism admitted on 10/30/2020 with abdominal pain and distension for 2 weeks. She came from [place] 2y ago and reports upper abdominal pain and right shoulder pain that has been going intermittently for 2 weeks and worsening. Associated with nausea but no vomiting, no loss of appetite or weight changed. Patient says her abdomen feels bloated. Denies fevers, loss of appetite, weight changes, night sweats, blood in stools.    Patient never had EGD, colonoscopy. She went to  and got CXR and abdominal X-ray that were normal but was sent to ED since she was tachycardiac.    Her last Mammo reportedly normal in 2019 as was PAP Smear        Vital Signs Last 24 Hrs  T(C): 36.9 (30 Oct 2020 15:34), Max: 36.9 (30 Oct 2020 15:34)  T(F): 98.5 (30 Oct 2020 15:34), Max: 98.5 (30 Oct 2020 15:34)  HR: 99 (30 Oct 2020 15:34) (99 - 110)  BP: 122/73 (30 Oct 2020 15:34) (120/75 - 122/73)  BP(mean): --  RR: 17 (30 Oct 2020 15:34) (16 - 17)  SpO2: 98% (30 Oct 2020 15:34) (98% - 98%)        PAST MEDICAL & SURGICAL HISTORY:  Hypothyroid    Hypercholesterolemia    HTN (hypertension)    No significant past surgical history        ROS:  Negative except for:    MEDICATIONS  (STANDING):  atorvastatin 20 milliGRAM(s) Oral at bedtime  enoxaparin Injectable 40 milliGRAM(s) SubCutaneous daily  levothyroxine 25 MICROGram(s) Oral daily  losartan 25 milliGRAM(s) Oral daily    MEDICATIONS  (PRN):  acetaminophen   Tablet .. 650 milliGRAM(s) Oral every 6 hours PRN Temp greater or equal to 38C (100.4F)      Allergies    No Known Allergies    Intolerances        Vital Signs Last 24 Hrs  T(C): 36.6 (31 Oct 2020 13:56), Max: 38.2 (30 Oct 2020 21:22)  T(F): 97.8 (31 Oct 2020 13:56), Max: 100.8 (30 Oct 2020 21:22)  HR: 96 (31 Oct 2020 13:56) (96 - 110)  BP: 103/64 (31 Oct 2020 13:56) (102/52 - 123/62)  BP(mean): --  RR: 18 (31 Oct 2020 13:56) (17 - 18)  SpO2: 100% (31 Oct 2020 13:56) (96% - 100%)    PHYSICAL EXAM  General: adult in NAD  HEENT: clear oropharynx, anicteric sclera, pink conjunctiva  Neck: supple  CV: normal S1/S2 with no murmur rubs or gallops  Lungs: positive air movement b/l ant lungs,clear to auscultation, no wheezes, no rales  Abdomen: softly distended, + Adcites  Ext: no clubbing cyanosis or edema  Skin: no rashes and no petechiae  Neuro: alert and oriented X 4, no focal deficits  LABS:                          10.1   7.26  )-----------( 357      ( 31 Oct 2020 06:21 )             31.5     Serial CBC's  10-31 @ 06:21  Hct-31.5 / Hgb-10.1 / Plat-357 / RBC-3.53 / WBC-7.26          Serial CBC's  10-30 @ 12:43  Hct-35.1 / Hgb-11.0 / Plat-384 / RBC-3.91 / WBC-8.39            10-31    136  |  105  |  8   ----------------------------<  91  3.9   |  27  |  0.68    Ca    8.5      31 Oct 2020 06:21  Phos  3.2     10-31  Mg     2.3     10-31    TPro  7.0  /  Alb  2.6<L>  /  TBili  0.5  /  DBili  x   /  AST  30  /  ALT  44  /  AlkPhos  159<H>  10-31          Folate, Serum: 15.8 ng/mL (10-31 @ 12:26)  Vitamin B12, Serum: 414 pg/mL (10-31 @ 12:26)            RADIOLOGY & ADDITIONAL STUDIES:

## 2020-10-31 NOTE — PROGRESS NOTE ADULT - SUBJECTIVE AND OBJECTIVE BOX
Patient is a 60y old  Female who presents with a chief complaint of abdominal pain (30 Oct 2020 21:38)    PATIENT IS SEEN AND EXAMINED IN MEDICAL FLOOR.    ALLERGIES:  No Known Allergies      VITALS:    Vital Signs Last 24 Hrs  T(C): 36.6 (31 Oct 2020 13:56), Max: 38.2 (30 Oct 2020 21:22)  T(F): 97.8 (31 Oct 2020 13:56), Max: 100.8 (30 Oct 2020 21:22)  HR: 96 (31 Oct 2020 13:56) (96 - 110)  BP: 103/64 (31 Oct 2020 13:56) (102/52 - 123/62)  BP(mean): --  RR: 18 (31 Oct 2020 13:56) (17 - 18)  SpO2: 100% (31 Oct 2020 13:56) (96% - 100%)    LABS:    CBC Full  -  ( 31 Oct 2020 06:21 )  WBC Count : 7.26 K/uL  RBC Count : 3.53 M/uL  Hemoglobin : 10.1 g/dL  Hematocrit : 31.5 %  Platelet Count - Automated : 357 K/uL  Mean Cell Volume : 89.2 fl  Mean Cell Hemoglobin : 28.6 pg  Mean Cell Hemoglobin Concentration : 32.1 gm/dL  Auto Neutrophil # : 5.38 K/uL  Auto Lymphocyte # : 1.06 K/uL  Auto Monocyte # : 0.65 K/uL  Auto Eosinophil # : 0.08 K/uL  Auto Basophil # : 0.05 K/uL  Auto Neutrophil % : 74.0 %  Auto Lymphocyte % : 14.6 %  Auto Monocyte % : 9.0 %  Auto Eosinophil % : 1.1 %  Auto Basophil % : 0.7 %      10-31    136  |  105  |  8   ----------------------------<  91  3.9   |  27  |  0.68    Ca    8.5      31 Oct 2020 06:21  Phos  3.2     10-31  Mg     2.3     10-31    TPro  7.0  /  Alb  2.6<L>  /  TBili  0.5  /  DBili  x   /  AST  30  /  ALT  44  /  AlkPhos  159<H>  10-31    CAPILLARY BLOOD GLUCOSE        CARDIAC MARKERS ( 30 Oct 2020 12:43 )  <0.015 ng/mL / x     / x     / x     / x          LIVER FUNCTIONS - ( 31 Oct 2020 06:21 )  Alb: 2.6 g/dL / Pro: 7.0 g/dL / ALK PHOS: 159 U/L / ALT: 44 U/L DA / AST: 30 U/L / GGT: x           Creatinine Trend: 0.68<--, 0.72<--  I&O's Summary      MEDICATIONS:    MEDICATIONS  (STANDING):  atorvastatin 20 milliGRAM(s) Oral at bedtime  enoxaparin Injectable 40 milliGRAM(s) SubCutaneous daily  levothyroxine 25 MICROGram(s) Oral daily  losartan 25 milliGRAM(s) Oral daily      MEDICATIONS  (PRN):  acetaminophen   Tablet .. 650 milliGRAM(s) Oral every 6 hours PRN Temp greater or equal to 38C (100.4F)      REVIEW OF SYSTEMS:                           ALL ROS DONE [ X   ]    CONSTITUTIONAL:  LETHARGIC [   ], FEVER [   ], UNRESPONSIVE [   ]  CVS:  CP  [   ], SOB, [   ], PALPITATIONS [   ], DIZZYNESS [   ]  RS: COUGH [   ], SPUTUM [   ]  GI: ABDOMINAL PAIN [   ], NAUSEA [   ], VOMITINGS [   ], DIARRHEA [   ], CONSTIPATION [   ]  :  DYSURIA [   ], NOCTURIA [   ], INCREASED FREQUENCY [   ], DRIBLING [   ],  SKELETAL: PAINFUL JOINTS [   ], SWOLLEN JOINTS [   ], NECK ACHE [   ], LOW BACK ACHE [   ],  SKIN : ULCERS [   ], RASH [   ], ITCHING [   ]  CNS: HEAD ACHE [   ], DOUBLE VISION [   ], BLURRED VISION [   ], AMS / CONFUSION [   ], SEIZURES [   ], WEAKNESS [   ],TINGLING / NUMBNESS [   ]    PHYSICAL EXAMINATION:  GENERAL APPEARANCE: NO DISTRESS  HEENT:  NO PALLOR, NO  JVD,  NO   NODES, NECK SUPPLE  CVS: S1 +, S2 +,   RS: AEEB,  OCCASIONAL  RALES +,   NO RONCHI  ABD: SOFT, NT, NO, BS +  EXT: NO PE  SKIN: WARM,   SKELETAL:  ROM ACCEPTABLE  CNS:  AAO X 3 ,   DEFICITS    RADIOLOGY :    < from: CT Abdomen and Pelvis w/ Oral Cont and w/ IV Cont (10.30.20 @ 15:51) >  IMPRESSION:  Extensive omental carcinomatosis and moderate ascites. Favor a left ovarian primary malignancy as above. Recommend follow-up pelvic ultrasound. Pelvic MRI may also be of diagnostic benefit.    Dominant conglomerate mass inferior to the liver consistent with omental or mesenteric implants. Conglomerate omental mass in the lower abdomen as well.    Hepatic serosal implants.    Mesenteric implants as above.    Prominent epicardial lymph nodes.    Small hepatic mass indeterminant for parenchymal hepatic metastasis.    Findings were discussed with Dr. SHASTA GUTIERREZ [phone] 10/30/2020 4:14 PM by Dr. Lagos with read back confirmation.    < end of copied text >      < from: CT Chest No Cont (10.31.20 @ 09:36) >  Impression: 0.3 cm solid nodule involving the upper portion of the left major fissure.    < end of copied text >    < from: US Transvaginal (10.31.20 @ 12:01) >  IMPRESSION:  Large amount of ascites.    Nonvisualization of the ovaries. Consider further evaluation with contrast enhanced pelvic MRI as clinically indicated.    < end of copied text >        ASSESSMENT :     Secondary malignant neoplasm of retroperitoneum and peritoneum    Hypothyroid    Hypercholesterolemia    HTN (hypertension)    No significant past surgical history        PLAN:  HPI:  Patient, a 60 year old female with PMH of HTN, HLD, hypothyroidism presents to the ED with abdominal pain and distension for 2 weeks. She came from [place] 2y ago and reports upper abdominal pain and right shoulder pain that has been going intermittently for 2 weeks and worsening. It is associated with nausea but no vomiting, no loss of appetite or weight changed. Patient says her abdomen feels bloated. Denies fevers, loss of appetite, weight changes, night sweats, blood in stools. Endorses feeling weak and cold in the feet. According to daughter, she is independent, has no SOB and walks 1-2 miles every day. Patient never had EGD, colonoscopy. She went to  and got CXR and abdominal X-ray that were normal but was sent to ED since she was tachycardiac.    In ED, pt is in NAD. She received 20mg pepcid.    Vital Signs Last 24 Hrs  T(C): 36.9 (30 Oct 2020 15:34), Max: 36.9 (30 Oct 2020 15:34)  T(F): 98.5 (30 Oct 2020 15:34), Max: 98.5 (30 Oct 2020 15:34)  HR: 99 (30 Oct 2020 15:34) (99 - 110)  BP: 122/73 (30 Oct 2020 15:34) (120/75 - 122/73)  BP(mean): --  RR: 17 (30 Oct 2020 15:34) (16 - 17)  SpO2: 98% (30 Oct 2020 15:34) (98% - 98%)    GOC: Discussed with daughter, not ready to make a decision at this time. Will discuss with sister. Should be readdressed late. FULL CODE FOR NOW. (30 Oct 2020 18:01)      PLAN:  # SUSPECTED PERITONEAL CARCINOMATOSIS SUSPECTED TO BE S/T PRIMARY LEFT OVARIAN CA W/ METASTASIS TO LIVER AND MESENTERY - F/U TVUS, F/U CT CHEST, F/U  & CEA, ONCOLOGY CONSULT IN PROGRESS, GYNECOLOGY CONSULT IN PROGRESS, GYN/ONC CONSULT TO BE PLACED IN A.M.  # CHOLELITHIASIS  # HTN  # HYPOTHYROIDISM  # GI AND DVT PPX    TANIKA PIERCE M.D. COVERING BRIAN PIERCE M.D.     Patient is a 60y old  Female who presents with a chief complaint of abdominal pain (30 Oct 2020 21:38)    PATIENT IS SEEN AND EXAMINED IN MEDICAL FLOOR.    ALLERGIES:  No Known Allergies      VITALS:    Vital Signs Last 24 Hrs  T(C): 36.6 (31 Oct 2020 13:56), Max: 38.2 (30 Oct 2020 21:22)  T(F): 97.8 (31 Oct 2020 13:56), Max: 100.8 (30 Oct 2020 21:22)  HR: 96 (31 Oct 2020 13:56) (96 - 110)  BP: 103/64 (31 Oct 2020 13:56) (102/52 - 123/62)  BP(mean): --  RR: 18 (31 Oct 2020 13:56) (17 - 18)  SpO2: 100% (31 Oct 2020 13:56) (96% - 100%)    LABS:    CBC Full  -  ( 31 Oct 2020 06:21 )  WBC Count : 7.26 K/uL  RBC Count : 3.53 M/uL  Hemoglobin : 10.1 g/dL  Hematocrit : 31.5 %  Platelet Count - Automated : 357 K/uL  Mean Cell Volume : 89.2 fl  Mean Cell Hemoglobin : 28.6 pg  Mean Cell Hemoglobin Concentration : 32.1 gm/dL  Auto Neutrophil # : 5.38 K/uL  Auto Lymphocyte # : 1.06 K/uL  Auto Monocyte # : 0.65 K/uL  Auto Eosinophil # : 0.08 K/uL  Auto Basophil # : 0.05 K/uL  Auto Neutrophil % : 74.0 %  Auto Lymphocyte % : 14.6 %  Auto Monocyte % : 9.0 %  Auto Eosinophil % : 1.1 %  Auto Basophil % : 0.7 %      10-31    136  |  105  |  8   ----------------------------<  91  3.9   |  27  |  0.68    Ca    8.5      31 Oct 2020 06:21  Phos  3.2     10-31  Mg     2.3     10-31    TPro  7.0  /  Alb  2.6<L>  /  TBili  0.5  /  DBili  x   /  AST  30  /  ALT  44  /  AlkPhos  159<H>  10-31    CAPILLARY BLOOD GLUCOSE        CARDIAC MARKERS ( 30 Oct 2020 12:43 )  <0.015 ng/mL / x     / x     / x     / x          LIVER FUNCTIONS - ( 31 Oct 2020 06:21 )  Alb: 2.6 g/dL / Pro: 7.0 g/dL / ALK PHOS: 159 U/L / ALT: 44 U/L DA / AST: 30 U/L / GGT: x           Creatinine Trend: 0.68<--, 0.72<--  I&O's Summary      MEDICATIONS:    MEDICATIONS  (STANDING):  atorvastatin 20 milliGRAM(s) Oral at bedtime  enoxaparin Injectable 40 milliGRAM(s) SubCutaneous daily  levothyroxine 25 MICROGram(s) Oral daily  losartan 25 milliGRAM(s) Oral daily      MEDICATIONS  (PRN):  acetaminophen   Tablet .. 650 milliGRAM(s) Oral every 6 hours PRN Temp greater or equal to 38C (100.4F)      REVIEW OF SYSTEMS:                           ALL ROS DONE [ X   ]    CONSTITUTIONAL:  LETHARGIC [   ], FEVER [   ], UNRESPONSIVE [   ]  CVS:  CP  [   ], SOB, [   ], PALPITATIONS [   ], DIZZYNESS [   ]  RS: COUGH [   ], SPUTUM [   ]  GI: ABDOMINAL PAIN [   ], NAUSEA [   ], VOMITINGS [   ], DIARRHEA [   ], CONSTIPATION [   ]  :  DYSURIA [   ], NOCTURIA [   ], INCREASED FREQUENCY [   ], DRIBLING [   ],  SKELETAL: PAINFUL JOINTS [   ], SWOLLEN JOINTS [   ], NECK ACHE [   ], LOW BACK ACHE [   ],  SKIN : ULCERS [   ], RASH [   ], ITCHING [   ]  CNS: HEAD ACHE [   ], DOUBLE VISION [   ], BLURRED VISION [   ], AMS / CONFUSION [   ], SEIZURES [   ], WEAKNESS [   ],TINGLING / NUMBNESS [   ]    PHYSICAL EXAMINATION:  GENERAL APPEARANCE: NO DISTRESS  HEENT:  NO PALLOR, NO  JVD,  NO   NODES, NECK SUPPLE  CVS: S1 +, S2 +,   RS: AEEB,  OCCASIONAL  RALES +,   NO RONCHI  ABD: SOFT, NT, NO, BS + , PROTUBERANT ABDOMEN  EXT: NO PE  SKIN: WARM,   SKELETAL:  ROM ACCEPTABLE  CNS:  AAO X 3 ,   DEFICITS    RADIOLOGY :    < from: CT Abdomen and Pelvis w/ Oral Cont and w/ IV Cont (10.30.20 @ 15:51) >  IMPRESSION:  Extensive omental carcinomatosis and moderate ascites. Favor a left ovarian primary malignancy as above. Recommend follow-up pelvic ultrasound. Pelvic MRI may also be of diagnostic benefit.    Dominant conglomerate mass inferior to the liver consistent with omental or mesenteric implants. Conglomerate omental mass in the lower abdomen as well.    Hepatic serosal implants.    Mesenteric implants as above.    Prominent epicardial lymph nodes.    Small hepatic mass indeterminant for parenchymal hepatic metastasis.    Findings were discussed with Dr. SHASTA GUTIERREZ [phone] 10/30/2020 4:14 PM by Dr. Lagos with read back confirmation.    < end of copied text >      < from: CT Chest No Cont (10.31.20 @ 09:36) >  Impression: 0.3 cm solid nodule involving the upper portion of the left major fissure.    < end of copied text >    < from: US Transvaginal (10.31.20 @ 12:01) >  IMPRESSION:  Large amount of ascites.    Nonvisualization of the ovaries. Consider further evaluation with contrast enhanced pelvic MRI as clinically indicated.    < end of copied text >        ASSESSMENT :     Secondary malignant neoplasm of retroperitoneum and peritoneum    Hypothyroid    Hypercholesterolemia    HTN (hypertension)    No significant past surgical history        PLAN:  HPI:  Patient, a 60 year old female with PMH of HTN, HLD, hypothyroidism presents to the ED with abdominal pain and distension for 2 weeks. She came from [place] 2y ago and reports upper abdominal pain and right shoulder pain that has been going intermittently for 2 weeks and worsening. It is associated with nausea but no vomiting, no loss of appetite or weight changed. Patient says her abdomen feels bloated. Denies fevers, loss of appetite, weight changes, night sweats, blood in stools. Endorses feeling weak and cold in the feet. According to daughter, she is independent, has no SOB and walks 1-2 miles every day. Patient never had EGD, colonoscopy. She went to UC and got CXR and abdominal X-ray that were normal but was sent to ED since she was tachycardiac.    In ED, pt is in NAD. She received 20mg pepcid.    Vital Signs Last 24 Hrs  T(C): 36.9 (30 Oct 2020 15:34), Max: 36.9 (30 Oct 2020 15:34)  T(F): 98.5 (30 Oct 2020 15:34), Max: 98.5 (30 Oct 2020 15:34)  HR: 99 (30 Oct 2020 15:34) (99 - 110)  BP: 122/73 (30 Oct 2020 15:34) (120/75 - 122/73)  BP(mean): --  RR: 17 (30 Oct 2020 15:34) (16 - 17)  SpO2: 98% (30 Oct 2020 15:34) (98% - 98%)    GOC: Discussed with daughter, not ready to make a decision at this time. Will discuss with sister. Should be readdressed late. FULL CODE FOR NOW. (30 Oct 2020 18:01)      PLAN:  # SUSPECTED PERITONEAL CARCINOMATOSIS SUSPECTED TO BE S/T PRIMARY LEFT OVARIAN CA [LEFT ADNEXAL MASS] W/ METASTASIS TO LIVER AND MESENTERY W/ LEFT LUNG NODULE - REVIEWED TVUS, REVIEWED CT CHEST, ELEVATED  & CEA, ONCOLOGY CONSULT IN PROGRESS, GYNECOLOGY CONSULT IN PROGRESS, GYN/ONC CONSULT TO BE PLACED IN A.M.  # NORMOCYTIC ANEMIA - F/U ANEMIA PANEL  # CHOLELITHIASIS  # HTN  # HYPOTHYROIDISM  # CASE DISCUSSED AT LENGTH WITH DAUGHTERS AT BEDSIDE  # GI AND DVT PPX    TANIKA PIERCE M.D. COVERING BRIAN PIERCE M.D.

## 2020-10-31 NOTE — CHART NOTE - NSCHARTNOTEFT_GEN_A_CORE
[facility] FOLLOW UP NOTE     case d/w Dr. Gilbert GYN/ONC Fellow  please order CA 19-9 for am  Pt would benefit from IR guided paracentesis with omental biopsy for diagnosis   will d/w GYN/ONC attending for consult

## 2020-11-01 LAB
ALBUMIN SERPL ELPH-MCNC: 2.4 G/DL — LOW (ref 3.5–5)
ALP SERPL-CCNC: 192 U/L — HIGH (ref 40–120)
ALT FLD-CCNC: 73 U/L DA — HIGH (ref 10–60)
ANION GAP SERPL CALC-SCNC: 5 MMOL/L — SIGNIFICANT CHANGE UP (ref 5–17)
AST SERPL-CCNC: 48 U/L — HIGH (ref 10–40)
BASOPHILS # BLD AUTO: 0.04 K/UL — SIGNIFICANT CHANGE UP (ref 0–0.2)
BASOPHILS NFR BLD AUTO: 0.6 % — SIGNIFICANT CHANGE UP (ref 0–2)
BILIRUB SERPL-MCNC: 0.4 MG/DL — SIGNIFICANT CHANGE UP (ref 0.2–1.2)
BUN SERPL-MCNC: 9 MG/DL — SIGNIFICANT CHANGE UP (ref 7–18)
CALCIUM SERPL-MCNC: 8.7 MG/DL — SIGNIFICANT CHANGE UP (ref 8.4–10.5)
CANCER AG19-9 SERPL-ACNC: 479 U/ML — HIGH
CHLORIDE SERPL-SCNC: 105 MMOL/L — SIGNIFICANT CHANGE UP (ref 96–108)
CO2 SERPL-SCNC: 28 MMOL/L — SIGNIFICANT CHANGE UP (ref 22–31)
CREAT SERPL-MCNC: 0.62 MG/DL — SIGNIFICANT CHANGE UP (ref 0.5–1.3)
EOSINOPHIL # BLD AUTO: 0.11 K/UL — SIGNIFICANT CHANGE UP (ref 0–0.5)
EOSINOPHIL NFR BLD AUTO: 1.5 % — SIGNIFICANT CHANGE UP (ref 0–6)
FERRITIN SERPL-MCNC: 777 NG/ML — HIGH (ref 15–150)
FOLATE SERPL-MCNC: 12.5 NG/ML — SIGNIFICANT CHANGE UP
GLUCOSE SERPL-MCNC: 92 MG/DL — SIGNIFICANT CHANGE UP (ref 70–99)
HCT VFR BLD CALC: 31.3 % — LOW (ref 34.5–45)
HGB BLD-MCNC: 9.8 G/DL — LOW (ref 11.5–15.5)
IMM GRANULOCYTES NFR BLD AUTO: 0.3 % — SIGNIFICANT CHANGE UP (ref 0–1.5)
IRON SATN MFR SERPL: 10 % — LOW (ref 15–50)
IRON SATN MFR SERPL: 14 UG/DL — LOW (ref 40–150)
LDH SERPL L TO P-CCNC: 195 U/L — SIGNIFICANT CHANGE UP (ref 120–225)
LYMPHOCYTES # BLD AUTO: 1.42 K/UL — SIGNIFICANT CHANGE UP (ref 1–3.3)
LYMPHOCYTES # BLD AUTO: 19.9 % — SIGNIFICANT CHANGE UP (ref 13–44)
MAGNESIUM SERPL-MCNC: 2.3 MG/DL — SIGNIFICANT CHANGE UP (ref 1.6–2.6)
MCHC RBC-ENTMCNC: 28.2 PG — SIGNIFICANT CHANGE UP (ref 27–34)
MCHC RBC-ENTMCNC: 31.3 GM/DL — LOW (ref 32–36)
MCV RBC AUTO: 89.9 FL — SIGNIFICANT CHANGE UP (ref 80–100)
MONOCYTES # BLD AUTO: 0.69 K/UL — SIGNIFICANT CHANGE UP (ref 0–0.9)
MONOCYTES NFR BLD AUTO: 9.7 % — SIGNIFICANT CHANGE UP (ref 2–14)
NEUTROPHILS # BLD AUTO: 4.86 K/UL — SIGNIFICANT CHANGE UP (ref 1.8–7.4)
NEUTROPHILS NFR BLD AUTO: 68 % — SIGNIFICANT CHANGE UP (ref 43–77)
NRBC # BLD: 0 /100 WBCS — SIGNIFICANT CHANGE UP (ref 0–0)
PHOSPHATE SERPL-MCNC: 3.6 MG/DL — SIGNIFICANT CHANGE UP (ref 2.5–4.5)
PLATELET # BLD AUTO: 335 K/UL — SIGNIFICANT CHANGE UP (ref 150–400)
POTASSIUM SERPL-MCNC: 4.6 MMOL/L — SIGNIFICANT CHANGE UP (ref 3.5–5.3)
POTASSIUM SERPL-SCNC: 4.6 MMOL/L — SIGNIFICANT CHANGE UP (ref 3.5–5.3)
PROT SERPL-MCNC: 6.7 G/DL — SIGNIFICANT CHANGE UP (ref 6–8.3)
RBC # BLD: 3.48 M/UL — LOW (ref 3.8–5.2)
RBC # BLD: 3.48 M/UL — LOW (ref 3.8–5.2)
RBC # FLD: 12 % — SIGNIFICANT CHANGE UP (ref 10.3–14.5)
RETICS #: 40 K/UL — SIGNIFICANT CHANGE UP (ref 25–125)
RETICS/RBC NFR: 1.2 % — SIGNIFICANT CHANGE UP (ref 0.5–2.5)
SODIUM SERPL-SCNC: 138 MMOL/L — SIGNIFICANT CHANGE UP (ref 135–145)
TIBC SERPL-MCNC: 141 UG/DL — LOW (ref 250–450)
UIBC SERPL-MCNC: 127 UG/DL — SIGNIFICANT CHANGE UP (ref 110–370)
VIT B12 SERPL-MCNC: 460 PG/ML — SIGNIFICANT CHANGE UP (ref 232–1245)
WBC # BLD: 7.14 K/UL — SIGNIFICANT CHANGE UP (ref 3.8–10.5)
WBC # FLD AUTO: 7.14 K/UL — SIGNIFICANT CHANGE UP (ref 3.8–10.5)

## 2020-11-01 RX ADMIN — ATORVASTATIN CALCIUM 20 MILLIGRAM(S): 80 TABLET, FILM COATED ORAL at 21:54

## 2020-11-01 RX ADMIN — Medication 25 MICROGRAM(S): at 06:15

## 2020-11-01 NOTE — PROGRESS NOTE ADULT - SUBJECTIVE AND OBJECTIVE BOX
Patient is a 60y old  Female who presents with a chief complaint of abdominal pain (31 Oct 2020 16:08)    PATIENT IS SEEN AND EXAMINED IN MEDICAL FLOOR.    ALLERGIES:  No Known Allergies    Daily     Daily     VITALS:    Vital Signs Last 24 Hrs  T(C): 36.7 (01 Nov 2020 05:20), Max: 37.1 (31 Oct 2020 20:45)  T(F): 98 (01 Nov 2020 05:20), Max: 98.8 (31 Oct 2020 20:45)  HR: 89 (01 Nov 2020 05:20) (89 - 114)  BP: 104/63 (01 Nov 2020 05:20) (103/64 - 114/57)  BP(mean): --  RR: 17 (01 Nov 2020 05:20) (17 - 18)  SpO2: 98% (01 Nov 2020 05:20) (98% - 100%)    LABS:    CBC Full  -  ( 01 Nov 2020 05:58 )  WBC Count : 7.14 K/uL  RBC Count : 3.48 M/uL  Hemoglobin : 9.8 g/dL  Hematocrit : 31.3 %  Platelet Count - Automated : 335 K/uL  Mean Cell Volume : 89.9 fl  Mean Cell Hemoglobin : 28.2 pg  Mean Cell Hemoglobin Concentration : 31.3 gm/dL  Auto Neutrophil # : 4.86 K/uL  Auto Lymphocyte # : 1.42 K/uL  Auto Monocyte # : 0.69 K/uL  Auto Eosinophil # : 0.11 K/uL  Auto Basophil # : 0.04 K/uL  Auto Neutrophil % : 68.0 %  Auto Lymphocyte % : 19.9 %  Auto Monocyte % : 9.7 %  Auto Eosinophil % : 1.5 %  Auto Basophil % : 0.6 %      11-01    138  |  105  |  9   ----------------------------<  92  4.6   |  28  |  0.62    Ca    8.7      01 Nov 2020 05:58  Phos  3.6     11-01  Mg     2.3     11-01    TPro  6.7  /  Alb  2.4<L>  /  TBili  0.4  /  DBili  x   /  AST  48<H>  /  ALT  73<H>  /  AlkPhos  192<H>  11-01    CAPILLARY BLOOD GLUCOSE    LIVER FUNCTIONS - ( 01 Nov 2020 05:58 )  Alb: 2.4 g/dL / Pro: 6.7 g/dL / ALK PHOS: 192 U/L / ALT: 73 U/L DA / AST: 48 U/L / GGT: x           Creatinine Trend: 0.62<--, 0.68<--, 0.72<--  I&O's Summary      .Blood Blood  10-31 @ 10:25   No growth to date.  --  --      MEDICATIONS:    MEDICATIONS  (STANDING):  atorvastatin 20 milliGRAM(s) Oral at bedtime  enoxaparin Injectable 40 milliGRAM(s) SubCutaneous daily  levothyroxine 25 MICROGram(s) Oral daily  losartan 25 milliGRAM(s) Oral daily      MEDICATIONS  (PRN):  acetaminophen   Tablet .. 650 milliGRAM(s) Oral every 6 hours PRN Temp greater or equal to 38C (100.4F)      REVIEW OF SYSTEMS:                           ALL ROS DONE [ X   ]    CONSTITUTIONAL:  LETHARGIC [   ], FEVER [   ], UNRESPONSIVE [   ]  CVS:  CP  [   ], SOB, [   ], PALPITATIONS [   ], DIZZYNESS [   ]  RS: COUGH [   ], SPUTUM [   ]  GI: ABDOMINAL PAIN [   ], NAUSEA [   ], VOMITINGS [   ], DIARRHEA [   ], CONSTIPATION [   ]  :  DYSURIA [   ], NOCTURIA [   ], INCREASED FREQUENCY [   ], DRIBLING [   ],  SKELETAL: PAINFUL JOINTS [   ], SWOLLEN JOINTS [   ], NECK ACHE [   ], LOW BACK ACHE [   ],  SKIN : ULCERS [   ], RASH [   ], ITCHING [   ]  CNS: HEAD ACHE [   ], DOUBLE VISION [   ], BLURRED VISION [   ], AMS / CONFUSION [   ], SEIZURES [   ], WEAKNESS [   ],TINGLING / NUMBNESS [   ]      PHYSICAL EXAMINATION:  GENERAL APPEARANCE: NO DISTRESS  HEENT:  NO PALLOR, NO  JVD,  NO   NODES, NECK SUPPLE  CVS: S1 +, S2 +,   RS: AEEB,  OCCASIONAL  RALES +,   NO RONCHI  ABD: SOFT, NT, NO, BS + , PROTUBERANT ABDOMEN  EXT: NO PE  SKIN: WARM,   SKELETAL:  ROM ACCEPTABLE  CNS:  AAO X 3 ,   DEFICITS    RADIOLOGY :    < from: CT Abdomen and Pelvis w/ Oral Cont and w/ IV Cont (10.30.20 @ 15:51) >  IMPRESSION:  Extensive omental carcinomatosis and moderate ascites. Favor a left ovarian primary malignancy as above. Recommend follow-up pelvic ultrasound. Pelvic MRI may also be of diagnostic benefit.    Dominant conglomerate mass inferior to the liver consistent with omental or mesenteric implants. Conglomerate omental mass in the lower abdomen as well.    Hepatic serosal implants.    Mesenteric implants as above.    Prominent epicardial lymph nodes.    Small hepatic mass indeterminant for parenchymal hepatic metastasis.    Findings were discussed with Dr. SHASTA GUTIERREZ [phone] 10/30/2020 4:14 PM by Dr. Lagos with read back confirmation.    < end of copied text >      < from: CT Chest No Cont (10.31.20 @ 09:36) >  Impression: 0.3 cm solid nodule involving the upper portion of the left major fissure.    < end of copied text >    < from: US Transvaginal (10.31.20 @ 12:01) >  IMPRESSION:  Large amount of ascites.    Nonvisualization of the ovaries. Consider further evaluation with contrast enhanced pelvic MRI as clinically indicated.    < end of copied text >        ASSESSMENT :     Secondary malignant neoplasm of retroperitoneum and peritoneum    Hypothyroid    Hypercholesterolemia    HTN (hypertension)    No significant past surgical history        PLAN:  HPI:  Patient, a 60 year old female with PMH of HTN, HLD, hypothyroidism presents to the ED with abdominal pain and distension for 2 weeks. She came from [place] 2y ago and reports upper abdominal pain and right shoulder pain that has been going intermittently for 2 weeks and worsening. It is associated with nausea but no vomiting, no loss of appetite or weight changed. Patient says her abdomen feels bloated. Denies fevers, loss of appetite, weight changes, night sweats, blood in stools. Endorses feeling weak and cold in the feet. According to daughter, she is independent, has no SOB and walks 1-2 miles every day. Patient never had EGD, colonoscopy. She went to  and got CXR and abdominal X-ray that were normal but was sent to ED since she was tachycardiac.    In ED, pt is in NAD. She received 20mg pepcid.    Vital Signs Last 24 Hrs  T(C): 36.9 (30 Oct 2020 15:34), Max: 36.9 (30 Oct 2020 15:34)  T(F): 98.5 (30 Oct 2020 15:34), Max: 98.5 (30 Oct 2020 15:34)  HR: 99 (30 Oct 2020 15:34) (99 - 110)  BP: 122/73 (30 Oct 2020 15:34) (120/75 - 122/73)  BP(mean): --  RR: 17 (30 Oct 2020 15:34) (16 - 17)  SpO2: 98% (30 Oct 2020 15:34) (98% - 98%)    GOC: Discussed with daughter, not ready to make a decision at this time. Will discuss with sister. Should be readdressed late. FULL CODE FOR NOW. 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  # NORMOCYTIC ANEMIA - IRON DEFICIENCY + ? ANEMIA OF CHRONIC DISEASE   # CHOLELITHIASIS  # HTN  # HYPOTHYROIDISM  # CASE DISCUSSED AT LENGTH WITH DAUGHTERS AT BEDSIDE  # GI AND DVT PPX    TANIKA PIERCE M.D. COVERING BRIAN PIERCE M.D.     Patient is a 60y old  Female who presents with a chief complaint of abdominal pain (31 Oct 2020 16:08)    PATIENT IS SEEN AND EXAMINED IN MEDICAL FLOOR.    ALLERGIES:  No Known Allergies    Daily     Daily     VITALS:    Vital Signs Last 24 Hrs  T(C): 36.7 (01 Nov 2020 05:20), Max: 37.1 (31 Oct 2020 20:45)  T(F): 98 (01 Nov 2020 05:20), Max: 98.8 (31 Oct 2020 20:45)  HR: 89 (01 Nov 2020 05:20) (89 - 114)  BP: 104/63 (01 Nov 2020 05:20) (103/64 - 114/57)  BP(mean): --  RR: 17 (01 Nov 2020 05:20) (17 - 18)  SpO2: 98% (01 Nov 2020 05:20) (98% - 100%)    LABS:    CBC Full  -  ( 01 Nov 2020 05:58 )  WBC Count : 7.14 K/uL  RBC Count : 3.48 M/uL  Hemoglobin : 9.8 g/dL  Hematocrit : 31.3 %  Platelet Count - Automated : 335 K/uL  Mean Cell Volume : 89.9 fl  Mean Cell Hemoglobin : 28.2 pg  Mean Cell Hemoglobin Concentration : 31.3 gm/dL  Auto Neutrophil # : 4.86 K/uL  Auto Lymphocyte # : 1.42 K/uL  Auto Monocyte # : 0.69 K/uL  Auto Eosinophil # : 0.11 K/uL  Auto Basophil # : 0.04 K/uL  Auto Neutrophil % : 68.0 %  Auto Lymphocyte % : 19.9 %  Auto Monocyte % : 9.7 %  Auto Eosinophil % : 1.5 %  Auto Basophil % : 0.6 %      11-01    138  |  105  |  9   ----------------------------<  92  4.6   |  28  |  0.62    Ca    8.7      01 Nov 2020 05:58  Phos  3.6     11-01  Mg     2.3     11-01    TPro  6.7  /  Alb  2.4<L>  /  TBili  0.4  /  DBili  x   /  AST  48<H>  /  ALT  73<H>  /  AlkPhos  192<H>  11-01    CAPILLARY BLOOD GLUCOSE    LIVER FUNCTIONS - ( 01 Nov 2020 05:58 )  Alb: 2.4 g/dL / Pro: 6.7 g/dL / ALK PHOS: 192 U/L / ALT: 73 U/L DA / AST: 48 U/L / GGT: x           Creatinine Trend: 0.62<--, 0.68<--, 0.72<--  I&O's Summary      .Blood Blood  10-31 @ 10:25   No growth to date.  --  --      MEDICATIONS:    MEDICATIONS  (STANDING):  atorvastatin 20 milliGRAM(s) Oral at bedtime  enoxaparin Injectable 40 milliGRAM(s) SubCutaneous daily  levothyroxine 25 MICROGram(s) Oral daily  losartan 25 milliGRAM(s) Oral daily      MEDICATIONS  (PRN):  acetaminophen   Tablet .. 650 milliGRAM(s) Oral every 6 hours PRN Temp greater or equal to 38C (100.4F)      REVIEW OF SYSTEMS:                           ALL ROS DONE [ X   ]    CONSTITUTIONAL:  LETHARGIC [   ], FEVER [   ], UNRESPONSIVE [   ]  CVS:  CP  [   ], SOB, [   ], PALPITATIONS [   ], DIZZYNESS [   ]  RS: COUGH [   ], SPUTUM [   ]  GI: ABDOMINAL PAIN [   ], NAUSEA [   ], VOMITINGS [   ], DIARRHEA [   ], CONSTIPATION [   ]  :  DYSURIA [   ], NOCTURIA [   ], INCREASED FREQUENCY [   ], DRIBLING [   ],  SKELETAL: PAINFUL JOINTS [   ], SWOLLEN JOINTS [   ], NECK ACHE [   ], LOW BACK ACHE [   ],  SKIN : ULCERS [   ], RASH [   ], ITCHING [   ]  CNS: HEAD ACHE [   ], DOUBLE VISION [   ], BLURRED VISION [   ], AMS / CONFUSION [   ], SEIZURES [   ], WEAKNESS [   ],TINGLING / NUMBNESS [   ]      PHYSICAL EXAMINATION:  GENERAL APPEARANCE: NO DISTRESS  HEENT:  NO PALLOR, NO  JVD,  NO   NODES, NECK SUPPLE  CVS: S1 +, S2 +,   RS: AEEB,  OCCASIONAL  RALES +,   NO RONCHI  ABD: SOFT, NT, NO, BS + , PROTUBERANT ABDOMEN  EXT: NO PE  SKIN: WARM,   SKELETAL:  ROM ACCEPTABLE  CNS:  AAO X 3 , NO DEFICITS    RADIOLOGY :    < from: CT Abdomen and Pelvis w/ Oral Cont and w/ IV Cont (10.30.20 @ 15:51) >  IMPRESSION:  Extensive omental carcinomatosis and moderate ascites. Favor a left ovarian primary malignancy as above. Recommend follow-up pelvic ultrasound. Pelvic MRI may also be of diagnostic benefit.    Dominant conglomerate mass inferior to the liver consistent with omental or mesenteric implants. Conglomerate omental mass in the lower abdomen as well.    Hepatic serosal implants.    Mesenteric implants as above.    Prominent epicardial lymph nodes.    Small hepatic mass indeterminant for parenchymal hepatic metastasis.    Findings were discussed with Dr. SHASTA GUTIERREZ [phone] 10/30/2020 4:14 PM by Dr. Lagos with read back confirmation.    < end of copied text >      < from: CT Chest No Cont (10.31.20 @ 09:36) >  Impression: 0.3 cm solid nodule involving the upper portion of the left major fissure.    < end of copied text >    < from: US Transvaginal (10.31.20 @ 12:01) >  IMPRESSION:  Large amount of ascites.    Nonvisualization of the ovaries. Consider further evaluation with contrast enhanced pelvic MRI as clinically indicated.    < end of copied text >        ASSESSMENT :     Secondary malignant neoplasm of retroperitoneum and peritoneum    Hypothyroid    Hypercholesterolemia    HTN (hypertension)    No significant past surgical history        PLAN:  HPI:  Patient, a 60 year old female with PMH of HTN, HLD, hypothyroidism presents to the ED with abdominal pain and distension for 2 weeks. She came from [place] 2y ago and reports upper abdominal pain and right shoulder pain that has been going intermittently for 2 weeks and worsening. It is associated with nausea but no vomiting, no loss of appetite or weight changed. Patient says her abdomen feels bloated. Denies fevers, loss of appetite, weight changes, night sweats, blood in stools. Endorses feeling weak and cold in the feet. According to daughter, she is independent, has no SOB and walks 1-2 miles every day. Patient never had EGD, colonoscopy. She went to [facility] and got CXR and abdominal X-ray that were normal but was sent to ED since she was tachycardiac.    In ED, pt is in NAD. She received 20mg pepcid.    Vital Signs Last 24 Hrs  T(C): 36.9 (30 Oct 2020 15:34), Max: 36.9 (30 Oct 2020 15:34)  T(F): 98.5 (30 Oct 2020 15:34), Max: 98.5 (30 Oct 2020 15:34)  HR: 99 (30 Oct 2020 15:34) (99 - 110)  BP: 122/73 (30 Oct 2020 15:34) (120/75 - 122/73)  BP(mean): --  RR: 17 (30 Oct 2020 15:34) (16 - 17)  SpO2: 98% (30 Oct 2020 15:34) (98% - 98%)    GOC: Discussed with daughter, not ready to make a decision at this time. Will discuss with sister. Should be readdressed late. FULL CODE FOR NOW. (30 Oct 2020 18:01)      PLAN:  # SUSPECTED PERITONEAL CARCINOMATOSIS SUSPECTED TO BE S/T PRIMARY LEFT OVARIAN CA [LEFT ADNEXAL MASS] W/ METASTASIS TO LIVER AND MESENTERY W/ LEFT LUNG NODULE - REVIEWED TVUS, REVIEWED CT CHEST, ELEVATED ,  & CEA, ONCOLOGY CONSULT IN PROGRESS, GYNECOLOGY CONSULT IN PROGRESS, GYN/ONC CONSULT PLACED  - PATIENT MAY NEED IR GUIDED BIOPSY OF MASS  # NORMOCYTIC ANEMIA - IRON DEFICIENCY + ? ANEMIA OF CHRONIC DISEASE   # CHOLELITHIASIS  # HTN  # HYPOTHYROIDISM  # CASE DISCUSSED AT LENGTH WITH DAUGHTERS AT BEDSIDE  # GI AND DVT PPX    TANIKA PIERCE M.D. COVERING BRIAN PIERCE M.D.

## 2020-11-02 ENCOUNTER — RESULT REVIEW (OUTPATIENT)
Age: 60
End: 2020-11-02

## 2020-11-02 DIAGNOSIS — C56.9 MALIGNANT NEOPLASM OF UNSPECIFIED OVARY: ICD-10-CM

## 2020-11-02 DIAGNOSIS — Z02.9 ENCOUNTER FOR ADMINISTRATIVE EXAMINATIONS, UNSPECIFIED: ICD-10-CM

## 2020-11-02 DIAGNOSIS — D64.9 ANEMIA, UNSPECIFIED: ICD-10-CM

## 2020-11-02 LAB
ALBUMIN SERPL ELPH-MCNC: 2.4 G/DL — LOW (ref 3.5–5)
ALP SERPL-CCNC: 224 U/L — HIGH (ref 40–120)
ALT FLD-CCNC: 78 U/L DA — HIGH (ref 10–60)
ANION GAP SERPL CALC-SCNC: 5 MMOL/L — SIGNIFICANT CHANGE UP (ref 5–17)
AST SERPL-CCNC: 41 U/L — HIGH (ref 10–40)
BASOPHILS # BLD AUTO: 0.04 K/UL — SIGNIFICANT CHANGE UP (ref 0–0.2)
BASOPHILS NFR BLD AUTO: 0.5 % — SIGNIFICANT CHANGE UP (ref 0–2)
BILIRUB SERPL-MCNC: 0.4 MG/DL — SIGNIFICANT CHANGE UP (ref 0.2–1.2)
BUN SERPL-MCNC: 14 MG/DL — SIGNIFICANT CHANGE UP (ref 7–18)
CALCIUM SERPL-MCNC: 8.6 MG/DL — SIGNIFICANT CHANGE UP (ref 8.4–10.5)
CHLORIDE SERPL-SCNC: 106 MMOL/L — SIGNIFICANT CHANGE UP (ref 96–108)
CO2 SERPL-SCNC: 26 MMOL/L — SIGNIFICANT CHANGE UP (ref 22–31)
CREAT SERPL-MCNC: 0.66 MG/DL — SIGNIFICANT CHANGE UP (ref 0.5–1.3)
EOSINOPHIL # BLD AUTO: 0.13 K/UL — SIGNIFICANT CHANGE UP (ref 0–0.5)
EOSINOPHIL NFR BLD AUTO: 1.7 % — SIGNIFICANT CHANGE UP (ref 0–6)
GLUCOSE SERPL-MCNC: 85 MG/DL — SIGNIFICANT CHANGE UP (ref 70–99)
HCT VFR BLD CALC: 31.6 % — LOW (ref 34.5–45)
HGB BLD-MCNC: 9.9 G/DL — LOW (ref 11.5–15.5)
IMM GRANULOCYTES NFR BLD AUTO: 0.4 % — SIGNIFICANT CHANGE UP (ref 0–1.5)
INR BLD: 1.28 RATIO — HIGH (ref 0.88–1.16)
LYMPHOCYTES # BLD AUTO: 1.28 K/UL — SIGNIFICANT CHANGE UP (ref 1–3.3)
LYMPHOCYTES # BLD AUTO: 16.3 % — SIGNIFICANT CHANGE UP (ref 13–44)
MAGNESIUM SERPL-MCNC: 2.4 MG/DL — SIGNIFICANT CHANGE UP (ref 1.6–2.6)
MCHC RBC-ENTMCNC: 27.8 PG — SIGNIFICANT CHANGE UP (ref 27–34)
MCHC RBC-ENTMCNC: 31.3 GM/DL — LOW (ref 32–36)
MCV RBC AUTO: 88.8 FL — SIGNIFICANT CHANGE UP (ref 80–100)
MONOCYTES # BLD AUTO: 0.77 K/UL — SIGNIFICANT CHANGE UP (ref 0–0.9)
MONOCYTES NFR BLD AUTO: 9.8 % — SIGNIFICANT CHANGE UP (ref 2–14)
NEUTROPHILS # BLD AUTO: 5.61 K/UL — SIGNIFICANT CHANGE UP (ref 1.8–7.4)
NEUTROPHILS NFR BLD AUTO: 71.3 % — SIGNIFICANT CHANGE UP (ref 43–77)
NRBC # BLD: 0 /100 WBCS — SIGNIFICANT CHANGE UP (ref 0–0)
PHOSPHATE SERPL-MCNC: 3.3 MG/DL — SIGNIFICANT CHANGE UP (ref 2.5–4.5)
PLATELET # BLD AUTO: 365 K/UL — SIGNIFICANT CHANGE UP (ref 150–400)
POTASSIUM SERPL-MCNC: 4.3 MMOL/L — SIGNIFICANT CHANGE UP (ref 3.5–5.3)
POTASSIUM SERPL-SCNC: 4.3 MMOL/L — SIGNIFICANT CHANGE UP (ref 3.5–5.3)
PROT SERPL-MCNC: 6.9 G/DL — SIGNIFICANT CHANGE UP (ref 6–8.3)
PROTHROM AB SERPL-ACNC: 15.1 SEC — HIGH (ref 10.6–13.6)
RBC # BLD: 3.56 M/UL — LOW (ref 3.8–5.2)
RBC # FLD: 11.9 % — SIGNIFICANT CHANGE UP (ref 10.3–14.5)
SODIUM SERPL-SCNC: 137 MMOL/L — SIGNIFICANT CHANGE UP (ref 135–145)
WBC # BLD: 7.86 K/UL — SIGNIFICANT CHANGE UP (ref 3.8–10.5)
WBC # FLD AUTO: 7.86 K/UL — SIGNIFICANT CHANGE UP (ref 3.8–10.5)

## 2020-11-02 PROCEDURE — 77012 CT SCAN FOR NEEDLE BIOPSY: CPT | Mod: 26

## 2020-11-02 PROCEDURE — 88305 TISSUE EXAM BY PATHOLOGIST: CPT | Mod: 26

## 2020-11-02 PROCEDURE — 20206 BIOPSY MUSCLE PERQ NEEDLE: CPT

## 2020-11-02 PROCEDURE — 99222 1ST HOSP IP/OBS MODERATE 55: CPT

## 2020-11-02 RX ORDER — SOD SULF/SODIUM/NAHCO3/KCL/PEG
2000 SOLUTION, RECONSTITUTED, ORAL ORAL ONCE
Refills: 0 | Status: COMPLETED | OUTPATIENT
Start: 2020-11-03 | End: 2020-11-03

## 2020-11-02 RX ORDER — ACETAMINOPHEN 500 MG
650 TABLET ORAL EVERY 4 HOURS
Refills: 0 | Status: DISCONTINUED | OUTPATIENT
Start: 2020-11-02 | End: 2020-11-04

## 2020-11-02 RX ORDER — SOD SULF/SODIUM/NAHCO3/KCL/PEG
2000 SOLUTION, RECONSTITUTED, ORAL ORAL ONCE
Refills: 0 | Status: COMPLETED | OUTPATIENT
Start: 2020-11-02 | End: 2020-11-02

## 2020-11-02 RX ADMIN — Medication 650 MILLIGRAM(S): at 23:39

## 2020-11-02 RX ADMIN — ATORVASTATIN CALCIUM 20 MILLIGRAM(S): 80 TABLET, FILM COATED ORAL at 22:01

## 2020-11-02 RX ADMIN — Medication 25 MICROGRAM(S): at 05:28

## 2020-11-02 RX ADMIN — Medication 2000 MILLILITER(S): at 17:36

## 2020-11-02 NOTE — PROGRESS NOTE ADULT - ASSESSMENT
· Assessment	  1. Left Adnexal Mass w Extensive Peritoneal Carcinomatosis    -- : 652 and CEA :122  -- TV U/S w Large Ascites  -- CT Chest w NIRALI 0.3 cm solid nodule. ? Met  -- Pt to be seen by GYN ONC  -- Will need Tissue Dx but Favor Ovarian CA Likely at least Stage III  -- Path could be obtained w Paracentesis +/- Omental implant Bx  liver lesions  ?mets  spoke to IR, will do a biopsy of peritoneal mass  discussed with her daughter   is high, need tissue to confirm the origin of the cancer     2. Normocytic Anemia    -- check iron profile, b12/folate, LDH

## 2020-11-02 NOTE — CONSULT NOTE ADULT - CONSULT REASON
gyn called for noted by ct scan abd ascites suspicious for ov ca with liver mets
Pelvic Carcinomatosis
abdominal pain

## 2020-11-02 NOTE — CONSULT NOTE ADULT - ASSESSMENT
61yo s/p nsvdx2  menopause since age 45yo with no post menopausal bleeding episodes post menopause  admitted for abdominal pain  noted CT abd findings to have -Extensive omental carcinomatosis and moderate ascites. Favor a left ovarian primary malignancy as above  Dominant conglomerate mass inferior to the liver consistent with omental or mesenteric implants. Conglomerate omental mass in the lower abdomen as well.  Hepatic serosal implants.  Mesenteric implants as above.  Prominent epicardial lymph nodes.  Small hepatic mass indeterminant for parenchymal hepatic metastasis.    -per dr kinney: will discuss case with gyn onc on call in am      
1. Left Adnexal Mass w Extensive Peritoneal Carcinomatosis    -- : 652 and CEA :122  -- TV U/S w Large Ascites  -- CT Chest w NIRALI 0.3 cm solid nodule. ? Met  -- Pt to be seen by GYN ONC  -- Will need Tissue Dx but Favor Ovarian CA Likely at least Stage III  -- Path could be obtained w Paracentesis +/- Omental implant Bx    2. Normocytic Anemia    -- check iron profile, b12/folate, LDH    Kenzie Grier MD  [phone]
Patient is a 60y old  Female who presents with a chief complaint of abdominal pain

## 2020-11-02 NOTE — PROGRESS NOTE ADULT - SUBJECTIVE AND OBJECTIVE BOX
NP Note discussed with  Primary Attending    Patient is a 60y old  Female who presents with a chief complaint of abdominal pain (02 Nov 2020 09:16)      INTERVAL HPI/OVERNIGHT EVENTS: no new complaints    MEDICATIONS  (STANDING):  atorvastatin 20 milliGRAM(s) Oral at bedtime  enoxaparin Injectable 40 milliGRAM(s) SubCutaneous daily  levothyroxine 25 MICROGram(s) Oral daily  losartan 25 milliGRAM(s) Oral daily    MEDICATIONS  (PRN):  acetaminophen   Tablet .. 650 milliGRAM(s) Oral every 6 hours PRN Temp greater or equal to 38C (100.4F)      __________________________________________________  REVIEW OF SYSTEMS:  "I feel like water in my stomach"    CONSTITUTIONAL: No fever,   EYES: no acute visual disturbances  NECK: No pain or stiffness  RESPIRATORY: No cough; No shortness of breath  CARDIOVASCULAR: No chest pain, no palpitations  GASTROINTESTINAL: No pain. No nausea or vomiting; No diarrhea   NEUROLOGICAL: No headache or numbness, no tremors  MUSCULOSKELETAL: No joint pain, no muscle pain  GENITOURINARY: no dysuria, no frequency, no hesitancy  PSYCHIATRY: no depression , no anxiety  ALL OTHER  ROS negative        Vital Signs Last 24 Hrs  T(C): 37.2 (02 Nov 2020 13:45), Max: 37.2 (02 Nov 2020 13:45)  T(F): 99 (02 Nov 2020 13:45), Max: 99 (02 Nov 2020 13:45)  HR: 105 (02 Nov 2020 13:45) (93 - 105)  BP: 104/54 (02 Nov 2020 13:45) (104/54 - 110/63)  BP(mean): --  RR: 98 (02 Nov 2020 13:45) (16 - 98)  SpO2: 99% (02 Nov 2020 05:22) (98% - 99%)    ________________________________________________  PHYSICAL EXAM:  GENERAL: NAD  HEENT: Normocephalic;  conjunctivae and sclerae clear; moist mucous membranes;   NECK : supple  CHEST/LUNG: Clear to auscultation bilaterally with good air entry   HEART: S1 S2  regular; no murmurs, gallops or rubs  ABDOMEN: Large, distended, Soft, Nontender, Nondistended; Bowel sounds present  EXTREMITIES: no cyanosis; no edema; no calf tenderness  SKIN: warm and dry; no rash  NERVOUS SYSTEM:  Awake and alert; Oriented  to place, person and time ; no new deficits    _________________________________________________  LABS:                        9.9    7.86  )-----------( 365      ( 02 Nov 2020 06:28 )             31.6     11-02    137  |  106  |  14  ----------------------------<  85  4.3   |  26  |  0.66    Ca    8.6      02 Nov 2020 06:28  Phos  3.3     11-02  Mg     2.4     11-02    TPro  6.9  /  Alb  2.4<L>  /  TBili  0.4  /  DBili  x   /  AST  41<H>  /  ALT  78<H>  /  AlkPhos  224<H>  11-02    PT/INR - ( 02 Nov 2020 12:30 )   PT: 15.1 sec;   INR: 1.28 ratio             CAPILLARY BLOOD GLUCOSE    RADIOLOGY & ADDITIONAL TESTS:      US Hepatic & Pancreatic (10.30.20 @ 14:10) >  EXAM:  US LIVER AND PANCREAS                            PROCEDURE DATE:  10/30/2020          INTERPRETATION:  CLINICAL INFORMATION: Upper abdominal pain    COMPARISON: None available.    TECHNIQUE: Sonography of the right upper quadrant.    FINDINGS:    Liver: Within normal limits.  Bile ducts: Normal caliber. Common bile duct measures 3.4 mm.  Gallbladder: Cholelithiasis.  No focal tenderness or evidence of cholecystitis.  Pancreas: Visualized portions are within normal limits.  Right kidney: 9.9 cm. No hydronephrosis.  Ascites: Mild to moderate.  IVC: Visualized portions are within normal limits.    IMPRESSION:    Cholelithiasis.  No biliary ductal dilatation.  Mild to moderate ascites.    < end of copied text >      US Transvaginal (10.31.20 @ 12:01) >  EXAM:  US TRANSVAGINAL                          EXAM:  US PELVIC COMPLETE                            PROCEDURE DATE:  10/31/2020          INTERPRETATION:  CLINICAL INFORMATION: Abdominal pain and ascites. Recent CT demonstrates omental carcinomatosis.    LMP: Postmenopausal.    COMPARISON: Abdominal CT dated 10/30/2020.    TECHNIQUE:  Endovaginal and transabdominal pelvic sonogram.    FINDINGS:    Uterus: 5.3 x 5.3 x 3.3 cm. Within normal limits.  Endometrium: 2.5 mm. Within normal limits.    Adnexa: Neither ovary is visualized.    Fluid: Large amount of ascites.    IMPRESSION:  Large amount of ascites.    Nonvisualization of the ovaries. Consider further evaluation with contrast enhanced pelvic MRI as clinically indicated.    < end of copied text >      CT Abdomen and Pelvis w/ Oral Cont and w/ IV Cont (10.30.20 @ 15:51) >  EXAM:  CT ABDOMEN AND PELVIS OC IC                            PROCEDURE DATE:  10/30/2020          INTERPRETATION:  CLINICAL INDICATION: 60 years  Female with diffuse abd bloating, early satiety.    COMPARISON: None.    PROCEDURE:  CT of the Abdomen and Pelvis was performed with intravenous contrast.  Intravenous contrast: 90 ml Omnipaque 350. 10 ml discarded.  Oral contrast: positive contrast was administered.  Sagittal and coronal reformats were performed.    FINDINGS:  LOWER CHEST: Mild right lower lobe discoid atelectasis. Prominent right epicardial lymph nodes measuring up to 6 mm in short axis (2:23).    LIVER: Multiple peripheral hypodensities along the lateral and inferior hepatic surface, likely serosal implants. 1.1 cm hypodense mass in segment 3, indeterminate (2:26). Soft tissue nodule medial to the right hepatic lobe, either serosal implant or mesenteric implant (2:47). Left posterior lower quadrant mesenteric mass (2:102), measuring 2.2 x 1.3 cm.  BILE DUCTS: Normal caliber.  GALLBLADDER: Within normal limits.  SPLEEN: Within normal limits.  PANCREAS: Within normal limits.  ADRENALS: Within normal limits.  KIDNEYS/URETERS: Within normal limits.    BLADDER: Within normal limits.  REPRODUCTIVE ORGANS: Unremarkable uterus. Heterogeneous ill-defined left adnexal mass measuring 4.8 x 3.5 cm (2:127), suspicious for malignancy.    BOWEL: No bowel obstruction. Appendix is not visualized and cannot be assessed.  PERITONEUM: Omental carcinomatosis with innumerable omental nodules and fluid stranding conglomerate mass inferior to the liver measuring 3.2 x 4.6 x 8.0 cm consistent with mesenteric or omental implants (2:72 and 4:51). Other adjacent masses are present as well. Confluent omental masses in the lower anterior abdomen (2:108). Mesenteric implant anterior to the pancreas measuring 1.2 cm in diameter (2:44). Moderate ascites.  VESSELS: Within normal limits.  RETROPERITONEUM/LYMPH NODES: No lymphadenopathy.  ABDOMINAL WALL: Within normal limits.  BONES: Mild degenerative changes.    IMPRESSION:  Extensive omental carcinomatosis and moderate ascites. Favor a left ovarian primary malignancy as above. Recommend follow-up pelvic ultrasound. Pelvic MRI may also be of diagnostic benefit.    Dominant conglomerate mass inferior to the liver consistent with omental or mesenteric implants. Conglomerate omental mass in the lower abdomen as well.    Hepatic serosal implants.    Mesenteric implants as above.    Prominent epicardial lymph nodes.    Small hepatic mass indeterminant for parenchymal hepatic metastasis.    < end of copied text >      CT Chest No Cont (10.31.20 @ 09:36) >  EXAM:  CT CHEST                            PROCEDURE DATE:  10/31/2020          INTERPRETATION:  Clinical information: Evaluate for metastases.    CT scan of the chest was obtained without administration of intravenous contrast.    Few very small lymph nodes are present in the pretracheal space.    Heart is normal in size. No pericardial effusion is noted.    Esophagus is mildly dilated and filled with air.    No endobronchial lesions are noted. Few linear opacities representing atelectasis arenoted in the right lower lobe. 0.3 cm nodule is noted involving the upper portion of the left major fissure. No pleural effusions are noted.    For interpretation of the abdominal contents, please see the report of the CT scan of the abdomen performed on 10/30/2020.    Degenerative changes of the spine are noted.    Impression: 0.3 cm solid nodule involving the upper portion of the left major fissure.    < end of copied text >  Imaging Personally Reviewed:  YES    Consultant(s) Notes Reviewed:   YES  Care Discussed with Consultants : GYN/ONC, GI    Plan of care was discussed with patient and /or primary care giver; all questions and concerns were addressed and care was aligned with patient's wishes.

## 2020-11-02 NOTE — CONSULT NOTE ADULT - PROBLEM SELECTOR RECOMMENDATION 4
Normocytic anemia - Iron studies consistent with iron deficiency.   Iron supplementation Normocytic anemia - Iron studies consistent with iron deficiency.   Iron supplementation  Clear liquid diet for bowel prep  EGD / Colonoscopy tomorrow

## 2020-11-02 NOTE — PROGRESS NOTE ADULT - SUBJECTIVE AND OBJECTIVE BOX
feel ok  no fever  walk Ok  mild pain in abd but distended    Vital Signs Last 24 Hrs  T(C): 36.9 (30 Oct 2020 15:34), Max: 36.9 (30 Oct 2020 15:34)  T(F): 98.5 (30 Oct 2020 15:34), Max: 98.5 (30 Oct 2020 15:34)  HR: 99 (30 Oct 2020 15:34) (99 - 110)  BP: 122/73 (30 Oct 2020 15:34) (120/75 - 122/73)  BP(mean): --  RR: 17 (30 Oct 2020 15:34) (16 - 17)  SpO2: 98% (30 Oct 2020 15:34) (98% - 98%)    GOC: Discussed with daughter, not ready to make a decision at this time. Will discuss with sister. Should be readdressed late. FULL CODE FOR NOW. (30 Oct 2020 18:01)     Pt is seen and examined  pt is awake and lying in bed/out of bed to chair  pt seems comfortable and denies any complaints at this time    ROS:  Negative except for:    MEDICATIONS  (STANDING):  atorvastatin 20 milliGRAM(s) Oral at bedtime  enoxaparin Injectable 40 milliGRAM(s) SubCutaneous daily  levothyroxine 25 MICROGram(s) Oral daily  losartan 25 milliGRAM(s) Oral daily    MEDICATIONS  (PRN):  acetaminophen   Tablet .. 650 milliGRAM(s) Oral every 6 hours PRN Temp greater or equal to 38C (100.4F)      Allergies    No Known Allergies    Intolerances        Vital Signs Last 24 Hrs  T(C): 36.6 (02 Nov 2020 05:22), Max: 37.1 (01 Nov 2020 20:49)  T(F): 97.8 (02 Nov 2020 05:22), Max: 98.8 (01 Nov 2020 20:49)  HR: 93 (02 Nov 2020 05:22) (93 - 106)  BP: 106/66 (02 Nov 2020 05:22) (106/66 - 110/63)  BP(mean): --  RR: 16 (02 Nov 2020 05:22) (16 - 18)  SpO2: 99% (02 Nov 2020 05:22) (98% - 99%)    PHYSICAL EXAM  General: adult in NAD  HEENT: clear oropharynx, anicteric sclera, pink conjunctiva  Neck: supple  CV: normal S1/S2 with no murmur rubs or gallops  Lungs: positive air movement b/l ant lungs,clear to auscultation, no wheezes, no rales  Abdomen: soft non-tender non-distended, no hepatosplenomegaly  Ext: no clubbing cyanosis or edema  Skin: no rashes and no petechiae  Neuro: alert and oriented X 4, no focal deficits  LABS:                          9.9    7.86  )-----------( 365      ( 02 Nov 2020 06:28 )             31.6         Mean Cell Volume : 88.8 fl  Mean Cell Hemoglobin : 27.8 pg  Mean Cell Hemoglobin Concentration : 31.3 gm/dL  Auto Neutrophil # : 5.61 K/uL  Auto Lymphocyte # : 1.28 K/uL  Auto Monocyte # : 0.77 K/uL  Auto Eosinophil # : 0.13 K/uL  Auto Basophil # : 0.04 K/uL  Auto Neutrophil % : 71.3 %  Auto Lymphocyte % : 16.3 %  Auto Monocyte % : 9.8 %  Auto Eosinophil % : 1.7 %  Auto Basophil % : 0.5 %    Serial CBC  Hematocrit 31.6  Hemoglobin 9.9  Plat 365  RBC 3.56  WBC 7.86  Serial CBC  Hematocrit 31.3  Hemoglobin 9.8  Plat 335  RBC 3.48  WBC 7.14  Serial CBC  Hematocrit 31.5  Hemoglobin 10.1  Plat 357  RBC 3.53  WBC 7.26  Serial CBC  Hematocrit 35.1  Hemoglobin 11.0  Plat 384  RBC 3.91  WBC 8.39    11-02    137  |  106  |  14  ----------------------------<  85  4.3   |  26  |  0.66    Ca    8.6      02 Nov 2020 06:28  Phos  3.3     11-02  Mg     2.4     11-02    TPro  6.9  /  Alb  2.4<L>  /  TBili  0.4  /  DBili  x   /  AST  41<H>  /  ALT  78<H>  /  AlkPhos  224<H>  11-02          Ferritin, Serum: 777 ng/mL (11-01 @ 12:11)  Vitamin B12, Serum: 460 pg/mL (11-01 @ 12:11)  Folate, Serum: 12.5 ng/mL (11-01 @ 12:11)  Iron - Total Binding Capacity.: 141 ug/dL (11-01 @ 05:58)  Reticulocyte Percent: 1.2 % (11-01 @ 05:58)  Folate, Serum: 15.8 ng/mL (10-31 @ 12:26)  Vitamin B12, Serum: 414 pg/mL (10-31 @ 12:26)            BLOOD SMEAR INTERPRETATION:       RADIOLOGY & ADDITIONAL STUDIES:  < from: CT Abdomen and Pelvis w/ Oral Cont and w/ IV Cont (10.30.20 @ 15:51) >  CT of the Abdomen and Pelvis was performed with intravenous contrast.  Intravenous contrast: 90 ml Omnipaque 350. 10 ml discarded.  Oral contrast: positive contrast was administered.  Sagittal and coronal reformats were performed.    FINDINGS:  LOWER CHEST: Mild right lower lobe discoid atelectasis. Prominent right epicardial lymph nodes measuring up to 6 mm in short axis (2:23).    LIVER: Multiple peripheral hypodensities along the lateral and inferior hepatic surface, likely serosal implants. 1.1 cm hypodense mass in segment 3, indeterminate (2:26). Soft tissue nodule medial to the right hepatic lobe, either serosal implant or mesenteric implant (2:47). Left posterior lower quadrant mesenteric mass (2:102), measuring 2.2 x 1.3 cm.  BILE DUCTS: Normal caliber.  GALLBLADDER: Within normal limits.  SPLEEN: Within normal limits.  PANCREAS: Within normal limits.  ADRENALS: Within normal limits.  KIDNEYS/URETERS: Within normal limits.    BLADDER: Within normal limits.  REPRODUCTIVE ORGANS: Unremarkable uterus. Heterogeneous ill-defined left adnexal mass measuring 4.8 x 3.5 cm (2:127), suspicious for malignancy.    BOWEL: No bowel obstruction. Appendix is not visualized and cannot be assessed.  PERITONEUM: Omental carcinomatosis with innumerable omental nodules and fluid stranding conglomerate mass inferior to the liver measuring 3.2 x 4.6 x 8.0 cm consistent with mesenteric or omental implants (2:72 and 4:51). Other adjacent masses are present as well. Confluent omental masses in the lower anterior abdomen (2:108). Mesenteric implant anterior to the pancreas measuring 1.2 cm in diameter (2:44). Moderate ascites.  VESSELS: Within normal limits.  RETROPERITONEUM/LYMPH NODES: No lymphadenopathy.  ABDOMINAL WALL: Within normal limits.  BONES: Mild degenerative changes.    IMPRESSION:  Extensive omental carcinomatosis and moderate ascites. Favor a left ovarian primary malignancy as above. Recommend follow-up pelvic ultrasound. Pelvic MRI may also be of diagnostic benefit.    Dominant conglomerate mass inferior to the liver consistent with omental or mesenteric implants. Conglomerate omental mass in the lower abdomen as well.      < end of copied text >  < from: CT Abdomen and Pelvis w/ Oral Cont and w/ IV Cont (10.30.20 @ 15:51) >  Hepatic serosal implants.    Mesenteric implants as above.    Prominent epicardial lymph nodes.    Small hepatic mass indeterminant for parenchymal hepatic metastasis.    < end of copied text >

## 2020-11-02 NOTE — PROGRESS NOTE ADULT - SUBJECTIVE AND OBJECTIVE BOX
Interventional Radiology Brief - Operative Note    Procedure: CT guided biopsy of peritoneal mass    Operators: SERGE El MD    Anesthesia (type): local    Contrast: none    EBL: < 1 mL    Findings/Follow up Plan of Care: Peritoneal carcinomatosis again noted. Core biopsy of anterior peritoneal nodule/mass x5. Specimens handed to and deemed adequate by the pathologist in attendance. Follow up results.    Specimens Removed: as above    Implants: none    Complications: none    Condition/Disposition: stable / observation x 1 hour, then back to floor if patient remains hemodynamically stable.    Miscellaneous: HOLD prophylactic anticoagulation for 12-24 hours.    Please call Interventional Radiology with any questions, concerns, or issues.      Official report to follow.

## 2020-11-02 NOTE — CHART NOTE - NSCHARTNOTEFT_GEN_A_CORE
Patient seen and examined in am  in short - admitted with peritoneal carcinomatosis, noted to have elevated tumor markers  abdomen soft/distended, ascites  plan  recommend IR guided biopsy to evaluate gyn vs. GI primary  colonoscopy/endoscopy - elevated CEA  discussed plan with daughter and patient with  # [id number]  plan discussed with Dr. Kyle for neoadjuvant chemo  all questions answered  f/u in office 1- 2 months  Marilou Concepcion MD

## 2020-11-02 NOTE — PROGRESS NOTE ADULT - SUBJECTIVE AND OBJECTIVE BOX
Interventional Radiology Pre-Procedure Note    Procedure: Omental mass biopsy    Diagnosis/Indication: Patient is a 60y old Female with left adnexal mass and peritoneal carcinomatosis. Biopsy of peritoneal implant/lesion was requested.    PAST MEDICAL & SURGICAL HISTORY:  Hypothyroid  Hypercholesterolemia  HTN (hypertension)  No significant past surgical history    Allergies: No Known Allergies    LABS:  CBC Full  -  ( 02 Nov 2020 06:28 )  WBC Count : 7.86 K/uL  RBC Count : 3.56 M/uL  Hemoglobin : 9.9 g/dL  Hematocrit : 31.6 %  Platelet Count - Automated : 365 K/uL  Mean Cell Volume : 88.8 fl  Mean Cell Hemoglobin : 27.8 pg  Mean Cell Hemoglobin Concentration : 31.3 gm/dL  Auto Neutrophil # : 5.61 K/uL  Auto Lymphocyte # : 1.28 K/uL  Auto Monocyte # : 0.77 K/uL  Auto Eosinophil # : 0.13 K/uL  Auto Basophil # : 0.04 K/uL  Auto Neutrophil % : 71.3 %  Auto Lymphocyte % : 16.3 %  Auto Monocyte % : 9.8 %  Auto Eosinophil % : 1.7 %  Auto Basophil % : 0.5 %    11-02    137  |  106  |  14  ----------------------------<  85  4.3   |  26  |  0.66    Ca    8.6      02 Nov 2020 06:28  Phos  3.3     11-02  Mg     2.4     11-02    TPro  6.9  /  Alb  2.4<L>  /  TBili  0.4  /  DBili  x   /  AST  41<H>  /  ALT  78<H>  /  AlkPhos  224<H>  11-02    PT/INR - ( 02 Nov 2020 12:30 )   PT: 15.1 sec;   INR: 1.28 ratio      Procedure/ risks/ benefits/ alternatives were discussed with the patient and patient's daughter, both of whom verbalizes understanding, and witnessed informed consent was obtained.

## 2020-11-02 NOTE — CONSULT NOTE ADULT - PROBLEM SELECTOR RECOMMENDATION 9
-- : 652 and CEA :122  -- TV U/S w Large Ascites  -- CT Chest w NIRALI 0.3 cm solid nodule. ? Met  -- Pt to be seen by GYN ONC  -- Will need Tissue Dx but Favor Ovarian CA   -- Path could be obtained w Paracentesis +/- Omental implant Bx  liver lesions  ?mets  discussed with her daughter   is high, need tissue to confirm the origin of the cancer

## 2020-11-02 NOTE — PROGRESS NOTE ADULT - ASSESSMENT
Patient, a 60 year old female with PMH of HTN, HLD, hypothyroidism presents to the ED with abdominal pain and distension for 2 weeks. USG liver showed cholelithiasis, no biliary ductal dilatation, mild to moderate ascites. CT abd showed extensive omental carcinomatosis and moderate ascites favoring a left ovarian primary malignancy, dominant conglomerate mass inferior to the liver consistent with omental or mesenteric implants.Hepatic serosal implants, small hepatic mass indeterminant for parenchymal hepatic metastasis.    11/2-Pt. seen and examined, interview via [email or web address]  #[id number].  Pt. denies pain however states she feels "water in my stomach" with change of position.  Pt. is followed by hem/onc and GYN/ONC, scheduled for IR FNA today.  GI Dr. Roman consulted for EGD/Colonoscopy tomorrow.

## 2020-11-02 NOTE — CONSULT NOTE ADULT - SUBJECTIVE AND OBJECTIVE BOX
[facility]    Patient is a 60y old  Female who presents with a chief complaint of abdominal pain (02 Nov 2020 09:16)    HPI:  Patient, a 60 year old female with PMH of HTN, HLD, hypothyroidism presents to the ED with abdominal pain and distension for 2 weeks. She came from [place] 2y ago and reports upper abdominal pain and right shoulder pain that has been going intermittently for 2 weeks and worsening. It is associated with nausea but no vomiting, no loss of appetite or weight changed. Patient says her abdomen feels bloated. Denies fevers, loss of appetite, weight changes, night sweats, blood in stools. Endorses feeling weak and cold in the feet. According to daughter, she is independent, has no SOB and walks 1-2 miles every day. Patient never had EGD, colonoscopy. She went to  and got CXR and abdominal X-ray that were normal but was sent to ED since she was tachycardiac.    In ED, pt is in NAD. She received 20mg pepcid.    Vital Signs Last 24 Hrs  T(C): 36.9 (30 Oct 2020 15:34), Max: 36.9 (30 Oct 2020 15:34)  T(F): 98.5 (30 Oct 2020 15:34), Max: 98.5 (30 Oct 2020 15:34)  HR: 99 (30 Oct 2020 15:34) (99 - 110)  BP: 122/73 (30 Oct 2020 15:34) (120/75 - 122/73)  BP(mean): --  RR: 17 (30 Oct 2020 15:34) (16 - 17)  SpO2: 98% (30 Oct 2020 15:34) (98% - 98%)    GOC: Discussed with daughter, not ready to make a decision at this time. Will discuss with sister. Should be readdressed late. FULL CODE FOR NOW. (30 Oct 2020 18:01)    PMH/PSH:  PAST MEDICAL & SURGICAL HISTORY:  Hypothyroid    Hypercholesterolemia    HTN (hypertension)    No significant past surgical history      FH:  FAMILY HISTORY:  No pertinent family history in first degree relatives        MEDS:  MEDICATIONS  (STANDING):  atorvastatin 20 milliGRAM(s) Oral at bedtime  enoxaparin Injectable 40 milliGRAM(s) SubCutaneous daily  levothyroxine 25 MICROGram(s) Oral daily  losartan 25 milliGRAM(s) Oral daily  polyethylene glycol/electrolyte Solution. 2000 milliLiter(s) Oral once    MEDICATIONS  (PRN):  acetaminophen   Tablet .. 650 milliGRAM(s) Oral every 6 hours PRN Temp greater or equal to 38C (100.4F)    Allergies    No Known Allergies    Intolerances            CONSTITUTIONAL:  No weight loss, fever, chills, weakness or fatigue.  HEENT:  Eyes:  No visual loss, blurred vision, double vision or yellow sclerae. Ears, Nose, Throat:  No hearing loss, sneezing, congestion, runny nose or sore throat.  SKIN:  No rash or itching.  CARDIOVASCULAR:  No chest pain, chest pressure or chest discomfort. No palpitations or edema.  RESPIRATORY:  No shortness of breath, cough or sputum.  GASTROINTESTINAL:  SEE HPI  GENITOURINARY:  No dysuria, hematuria, urinary frequency  NEUROLOGICAL:  No headache, dizziness, syncope, paralysis, ataxia, numbness or tingling in the extremities. No change in bowel or bladder control.  MUSCULOSKELETAL:  No muscle, back pain, joint pain or stiffness.  HEMATOLOGIC:  No anemia, bleeding or bruising.  LYMPHATICS:  No enlarged nodes. No history of splenectomy.  PSYCHIATRIC:  No history of depression or anxiety.  ENDOCRINOLOGIC:  No reports of sweating, cold or heat intolerance. No polyuria or polydipsia.      ______________________________________________________________________  PHYSICAL EXAM:  T(C): 37.2 (11-02-20 @ 13:45), Max: 37.2 (11-02-20 @ 13:45)  HR: 105 (11-02-20 @ 13:45)  BP: 104/54 (11-02-20 @ 13:45)  RR: 98 (11-02-20 @ 13:45)  SpO2: 99% (11-02-20 @ 05:22)  Wt(kg): --      GEN: NAD, normocephalic  CVS: S1S2+  CHEST: clear to auscultation  ABD: soft,  distended, bowel sounds present, with RUQ pain on palpation  EXTR: no cyanosis, no clubbing, no edema  NEURO: Awake and alert; oriented to person place and time  SKIN:  warm;  non icteric    ______________________________________________________________________  LABS:                        9.9    7.86  )-----------( 365      ( 02 Nov 2020 06:28 )             31.6     11-02    137  |  106  |  14  ----------------------------<  85  4.3   |  26  |  0.66    Ca    8.6      02 Nov 2020 06:28  Phos  3.3     11-02  Mg     2.4     11-02    TPro  6.9  /  Alb  2.4<L>  /  TBili  0.4  /  DBili  x   /  AST  41<H>  /  ALT  78<H>  /  AlkPhos  224<H>  11-02    LIVER FUNCTIONS - ( 02 Nov 2020 06:28 )  Alb: 2.4 g/dL / Pro: 6.9 g/dL / ALK PHOS: 224 U/L / ALT: 78 U/L DA / AST: 41 U/L / GGT: x           PT/INR - ( 02 Nov 2020 12:30 )   PT: 15.1 sec;   INR: 1.28 ratio           ____________________________________________    IMAGING:    ______________________________________________________________________  ASSESSMENT:  60y Female    [facility]    Patient is a 60y old  Female who presents with a chief complaint of abdominal pain (02 Nov 2020 09:16)  Source : Conversed with patient in [place]       HPI:  Patient, a 60 year old female with PMH of HTN, HLD, hypothyroidism presents to the ED with abdominal pain and distension for 2 weeks. She came from [place] 2y ago and reports upper abdominal pain and right shoulder pain that has been going intermittently for 2 weeks and worsening. It is associated with nausea but no vomiting, no loss of appetite or weight changed. Patient says her abdomen feels bloated. Denies fevers, loss of appetite, weight changes, night sweats, blood in stools. Endorses feeling weak and cold in the feet. According to daughter, she is independent, has no SOB and walks 1-2 miles every day. Patient never had EGD, colonoscopy. She went to  and got CXR and abdominal X-ray that were normal but was sent to ED since she was tachycardiac.    In ED, pt is in NAD. She received 20mg pepcid.    Vital Signs Last 24 Hrs  T(C): 36.9 (30 Oct 2020 15:34), Max: 36.9 (30 Oct 2020 15:34)  T(F): 98.5 (30 Oct 2020 15:34), Max: 98.5 (30 Oct 2020 15:34)  HR: 99 (30 Oct 2020 15:34) (99 - 110)  BP: 122/73 (30 Oct 2020 15:34) (120/75 - 122/73)  BP(mean): --  RR: 17 (30 Oct 2020 15:34) (16 - 17)  SpO2: 98% (30 Oct 2020 15:34) (98% - 98%)    GOC: Discussed with daughter, not ready to make a decision at this time. Will discuss with sister. Should be readdressed late. FULL CODE FOR NOW. (30 Oct 2020 18:01)    PMH/PSH:  PAST MEDICAL & SURGICAL HISTORY:  Hypothyroid    Hypercholesterolemia    HTN (hypertension)    No significant past surgical history      FH:  FAMILY HISTORY:  No pertinent family history in first degree relatives        MEDS:  MEDICATIONS  (STANDING):  atorvastatin 20 milliGRAM(s) Oral at bedtime  enoxaparin Injectable 40 milliGRAM(s) SubCutaneous daily  levothyroxine 25 MICROGram(s) Oral daily  losartan 25 milliGRAM(s) Oral daily  polyethylene glycol/electrolyte Solution. 2000 milliLiter(s) Oral once    MEDICATIONS  (PRN):  acetaminophen   Tablet .. 650 milliGRAM(s) Oral every 6 hours PRN Temp greater or equal to 38C (100.4F)    Allergies    No Known Allergies    Intolerances            CONSTITUTIONAL:  No weight loss, fever, chills, weakness or fatigue.  HEENT:  Eyes:  No visual loss, blurred vision, double vision or yellow sclerae. Ears, Nose, Throat:  No hearing loss, sneezing, congestion, runny nose or sore throat.  SKIN:  No rash or itching.  CARDIOVASCULAR:  No chest pain, chest pressure or chest discomfort. No palpitations or edema.  RESPIRATORY:  No shortness of breath, cough or sputum.  GASTROINTESTINAL:  SEE HPI  GENITOURINARY:  No dysuria, hematuria, urinary frequency  NEUROLOGICAL:  No headache, dizziness, syncope, paralysis, ataxia, numbness or tingling in the extremities. No change in bowel or bladder control.  MUSCULOSKELETAL:  No muscle, back pain, joint pain or stiffness.  HEMATOLOGIC:  No anemia, bleeding or bruising.  LYMPHATICS:  No enlarged nodes. No history of splenectomy.  PSYCHIATRIC:  No history of depression or anxiety.  ENDOCRINOLOGIC:  No reports of sweating, cold or heat intolerance. No polyuria or polydipsia.      ______________________________________________________________________  PHYSICAL EXAM:  T(C): 37.2 (11-02-20 @ 13:45), Max: 37.2 (11-02-20 @ 13:45)  HR: 105 (11-02-20 @ 13:45)  BP: 104/54 (11-02-20 @ 13:45)  RR: 98 (11-02-20 @ 13:45)  SpO2: 99% (11-02-20 @ 05:22)  Wt(kg): --      GEN: NAD, normocephalic  CVS: S1S2+  CHEST: clear to auscultation  ABD: soft,  distended, bowel sounds present, with RUQ pain on palpation  EXTR: no cyanosis, no clubbing, no edema  NEURO: Awake and alert; oriented to person place and time  SKIN:  warm;  non icteric    ______________________________________________________________________  LABS:                        9.9    7.86  )-----------( 365      ( 02 Nov 2020 06:28 )             31.6     11-02    137  |  106  |  14  ----------------------------<  85  4.3   |  26  |  0.66    Ca    8.6      02 Nov 2020 06:28  Phos  3.3     11-02  Mg     2.4     11-02    TPro  6.9  /  Alb  2.4<L>  /  TBili  0.4  /  DBili  x   /  AST  41<H>  /  ALT  78<H>  /  AlkPhos  224<H>  11-02    LIVER FUNCTIONS - ( 02 Nov 2020 06:28 )  Alb: 2.4 g/dL / Pro: 6.9 g/dL / ALK PHOS: 224 U/L / ALT: 78 U/L DA / AST: 41 U/L / GGT: x           PT/INR - ( 02 Nov 2020 12:30 )   PT: 15.1 sec;   INR: 1.28 ratio           ____________________________________________    IMAGING:    ______________________________________________________________________  ASSESSMENT:  60y Female    [facility]    Patient is a 60y old  Female who presents with a chief complaint of abdominal pain (02 Nov 2020 09:16)  Source : Conversed with patient in [place]       HPI:  Patient, a 60 year old female with PMH of HTN, HLD, hypothyroidism presents to the ED with abdominal pain and distension for 2 weeks. She came from [place] 2y ago and reports upper abdominal pain and right shoulder pain that has been going intermittently for 2 weeks and worsening. It is associated with nausea but no vomiting, no loss of appetite or weight changed. Patient says her abdomen feels bloated. Denies fevers, loss of appetite, weight changes, night sweats, blood in stools. Endorses feeling weak and cold in the feet. According to daughter, she is independent, has no SOB and walks 1-2 miles every day. Patient never had EGD, colonoscopy. She went to  and got CXR and abdominal X-ray that were normal but was sent to ED since she was tachycardiac.    In ED, pt is in NAD. She received 20mg pepcid.    Vital Signs Last 24 Hrs  T(C): 36.9 (30 Oct 2020 15:34), Max: 36.9 (30 Oct 2020 15:34)  T(F): 98.5 (30 Oct 2020 15:34), Max: 98.5 (30 Oct 2020 15:34)  HR: 99 (30 Oct 2020 15:34) (99 - 110)  BP: 122/73 (30 Oct 2020 15:34) (120/75 - 122/73)  BP(mean): --  RR: 17 (30 Oct 2020 15:34) (16 - 17)  SpO2: 98% (30 Oct 2020 15:34) (98% - 98%)    GOC: Discussed with daughter, not ready to make a decision at this time. Will discuss with sister. Should be readdressed late. FULL CODE FOR NOW. (30 Oct 2020 18:01)    PMH/PSH:  PAST MEDICAL & SURGICAL HISTORY:  Hypothyroid    Hypercholesterolemia    HTN (hypertension)    No significant past surgical history      FH:  FAMILY HISTORY:  No pertinent family history in first degree relatives        MEDS:  MEDICATIONS  (STANDING):  atorvastatin 20 milliGRAM(s) Oral at bedtime  enoxaparin Injectable 40 milliGRAM(s) SubCutaneous daily  levothyroxine 25 MICROGram(s) Oral daily  losartan 25 milliGRAM(s) Oral daily  polyethylene glycol/electrolyte Solution. 2000 milliLiter(s) Oral once    MEDICATIONS  (PRN):  acetaminophen   Tablet .. 650 milliGRAM(s) Oral every 6 hours PRN Temp greater or equal to 38C (100.4F)    Allergies    No Known Allergies    Intolerances            CONSTITUTIONAL:  No weight loss, fever, chills, weakness or fatigue.  HEENT:  Eyes:  No visual loss, blurred vision, double vision or yellow sclerae. Ears, Nose, Throat:  No hearing loss, sneezing, congestion, runny nose or sore throat.  SKIN:  No rash or itching.  CARDIOVASCULAR:  No chest pain, chest pressure or chest discomfort. No palpitations or edema.  RESPIRATORY:  No shortness of breath, cough or sputum.  GASTROINTESTINAL:  SEE HPI  GENITOURINARY:  No dysuria, hematuria, urinary frequency  NEUROLOGICAL:  No headache, dizziness, syncope, paralysis, ataxia, numbness or tingling in the extremities. No change in bowel or bladder control.  MUSCULOSKELETAL:  No muscle, back pain, joint pain or stiffness.  HEMATOLOGIC:  No bleeding or bruising.  LYMPHATICS:  No enlarged nodes. No history of splenectomy.  PSYCHIATRIC:  No history of depression or anxiety.  ENDOCRINOLOGIC:  No reports of sweating, cold or heat intolerance. No polyuria or polydipsia.      ______________________________________________________________________  PHYSICAL EXAM:  T(C): 37.2 (11-02-20 @ 13:45), Max: 37.2 (11-02-20 @ 13:45)  HR: 105 (11-02-20 @ 13:45)  BP: 104/54 (11-02-20 @ 13:45)  RR: 98 (11-02-20 @ 13:45)  SpO2: 99% (11-02-20 @ 05:22)  Wt(kg): --      GEN: NAD, normocephalic  CVS: S1S2+  CHEST: clear to auscultation  ABD: soft,  distended, bowel sounds present, with RUQ pain on palpation  EXTR: no cyanosis, no clubbing, no edema  NEURO: Awake and alert; oriented to person place and time  SKIN:  warm;  non icteric    ______________________________________________________________________  LABS:                        9.9    7.86  )-----------( 365      ( 02 Nov 2020 06:28 )             31.6     11-02    137  |  106  |  14  ----------------------------<  85  4.3   |  26  |  0.66    Ca    8.6      02 Nov 2020 06:28  Phos  3.3     11-02  Mg     2.4     11-02    TPro  6.9  /  Alb  2.4<L>  /  TBili  0.4  /  DBili  x   /  AST  41<H>  /  ALT  78<H>  /  AlkPhos  224<H>  11-02    LIVER FUNCTIONS - ( 02 Nov 2020 06:28 )  Alb: 2.4 g/dL / Pro: 6.9 g/dL / ALK PHOS: 224 U/L / ALT: 78 U/L DA / AST: 41 U/L / GGT: x           PT/INR - ( 02 Nov 2020 12:30 )   PT: 15.1 sec;   INR: 1.28 ratio           ____________________________________________    IMAGING:    ______________________________________________________________________  ASSESSMENT:  60y Female    PLAN:

## 2020-11-02 NOTE — PROGRESS NOTE ADULT - PROBLEM SELECTOR PLAN 7
-From home, lives with daughter, will likely discharge to same when w/u completed  -Will likely follow up with oncology as OP

## 2020-11-02 NOTE — PROGRESS NOTE ADULT - PROBLEM SELECTOR PLAN 3
CT also showed Hepatic serosal implants, small hepatic mass indeterminant for parenchymal hepatic metastasis.  -Dr Kyle following  -Pt. with mild transaminitis

## 2020-11-02 NOTE — PROGRESS NOTE ADULT - ATTENDING COMMENTS
HPI:  Patient, a 60 year old female with PMH of HTN, HLD, hypothyroidism presents to the ED with abdominal pain and distension for 2 weeks. She came from [place] 2y ago and reports upper abdominal pain and right shoulder pain that has been going intermittently for 2 weeks and worsening. It is associated with nausea but no vomiting, no loss of appetite or weight changed. Patient says her abdomen feels bloated. Denies fevers, loss of appetite, weight changes, night sweats, blood in stools. Endorses feeling weak and cold in the feet. According to daughter, she is independent, has no SOB and walks 1-2 miles every day. Patient never had EGD, colonoscopy. She went to  and got CXR and abdominal X-ray that were normal but was sent to ED since she was tachycardiac.    In ED, pt is in NAD. She received 20mg pepcid.    Vital Signs Last 24 Hrs  T(C): 36.9 (30 Oct 2020 15:34), Max: 36.9 (30 Oct 2020 15:34)  T(F): 98.5 (30 Oct 2020 15:34), Max: 98.5 (30 Oct 2020 15:34)  HR: 99 (30 Oct 2020 15:34) (99 - 110)  BP: 122/73 (30 Oct 2020 15:34) (120/75 - 122/73)  BP(mean): --  RR: 17 (30 Oct 2020 15:34) (16 - 17)  SpO2: 98% (30 Oct 2020 15:34) (98% - 98%)    GOC: Discussed with daughter, not ready to make a decision at this time. Will discuss with sister. Should be readdressed late. FULL CODE FOR NOW. 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 ANEMIA OF CHRONIC DISEASE - PLAN FOR COLONOSCOPY IN A.M.; GASTROENTEROLOGY CONSULT IN PROGRESS  # CHOLELITHIASIS  # HTN  # HYPOTHYROIDISM  # CASE DISCUSSED AT LENGTH WITH DAUGHTERS AT BEDSIDE - WILLIE  # GI AND DVT PPX    TANIKA PIERCE M.D. COVERING BRIAN PIERCE M.D.

## 2020-11-03 ENCOUNTER — RESULT REVIEW (OUTPATIENT)
Age: 60
End: 2020-11-03

## 2020-11-03 LAB
ALBUMIN SERPL ELPH-MCNC: 2.2 G/DL — LOW (ref 3.5–5)
ALP SERPL-CCNC: 303 U/L — HIGH (ref 40–120)
ALT FLD-CCNC: 169 U/L DA — HIGH (ref 10–60)
ANION GAP SERPL CALC-SCNC: 6 MMOL/L — SIGNIFICANT CHANGE UP (ref 5–17)
AST SERPL-CCNC: 117 U/L — HIGH (ref 10–40)
BILIRUB SERPL-MCNC: 0.4 MG/DL — SIGNIFICANT CHANGE UP (ref 0.2–1.2)
BUN SERPL-MCNC: 16 MG/DL — SIGNIFICANT CHANGE UP (ref 7–18)
CALCIUM SERPL-MCNC: 8.4 MG/DL — SIGNIFICANT CHANGE UP (ref 8.4–10.5)
CHLORIDE SERPL-SCNC: 105 MMOL/L — SIGNIFICANT CHANGE UP (ref 96–108)
CO2 SERPL-SCNC: 28 MMOL/L — SIGNIFICANT CHANGE UP (ref 22–31)
CREAT SERPL-MCNC: 0.64 MG/DL — SIGNIFICANT CHANGE UP (ref 0.5–1.3)
GLUCOSE SERPL-MCNC: 95 MG/DL — SIGNIFICANT CHANGE UP (ref 70–99)
HCT VFR BLD CALC: 31.4 % — LOW (ref 34.5–45)
HGB BLD-MCNC: 9.9 G/DL — LOW (ref 11.5–15.5)
MCHC RBC-ENTMCNC: 28 PG — SIGNIFICANT CHANGE UP (ref 27–34)
MCHC RBC-ENTMCNC: 31.5 GM/DL — LOW (ref 32–36)
MCV RBC AUTO: 89 FL — SIGNIFICANT CHANGE UP (ref 80–100)
NRBC # BLD: 0 /100 WBCS — SIGNIFICANT CHANGE UP (ref 0–0)
PLATELET # BLD AUTO: 372 K/UL — SIGNIFICANT CHANGE UP (ref 150–400)
POTASSIUM SERPL-MCNC: 3.8 MMOL/L — SIGNIFICANT CHANGE UP (ref 3.5–5.3)
POTASSIUM SERPL-SCNC: 3.8 MMOL/L — SIGNIFICANT CHANGE UP (ref 3.5–5.3)
PROT SERPL-MCNC: 6.6 G/DL — SIGNIFICANT CHANGE UP (ref 6–8.3)
RBC # BLD: 3.53 M/UL — LOW (ref 3.8–5.2)
RBC # FLD: 12.1 % — SIGNIFICANT CHANGE UP (ref 10.3–14.5)
SARS-COV-2 IGG SERPL QL IA: POSITIVE
SARS-COV-2 IGM SERPL IA-ACNC: 45 INDEX — HIGH
SODIUM SERPL-SCNC: 139 MMOL/L — SIGNIFICANT CHANGE UP (ref 135–145)
WBC # BLD: 7.6 K/UL — SIGNIFICANT CHANGE UP (ref 3.8–10.5)
WBC # FLD AUTO: 7.6 K/UL — SIGNIFICANT CHANGE UP (ref 3.8–10.5)

## 2020-11-03 PROCEDURE — 88305 TISSUE EXAM BY PATHOLOGIST: CPT | Mod: 26

## 2020-11-03 PROCEDURE — 88312 SPECIAL STAINS GROUP 1: CPT | Mod: 26

## 2020-11-03 PROCEDURE — 45380 COLONOSCOPY AND BIOPSY: CPT

## 2020-11-03 PROCEDURE — 43239 EGD BIOPSY SINGLE/MULTIPLE: CPT

## 2020-11-03 RX ADMIN — Medication 2000 MILLILITER(S): at 05:44

## 2020-11-03 RX ADMIN — ATORVASTATIN CALCIUM 20 MILLIGRAM(S): 80 TABLET, FILM COATED ORAL at 21:12

## 2020-11-03 RX ADMIN — Medication 25 MICROGRAM(S): at 05:48

## 2020-11-03 RX ADMIN — Medication 650 MILLIGRAM(S): at 01:16

## 2020-11-03 NOTE — PROGRESS NOTE ADULT - ASSESSMENT
· Assessment	  1. Left Adnexal Mass w Extensive Peritoneal Carcinomatosis    -- : 652 and CEA :122  -- TV U/S w Large Ascites  -- CT Chest w NIRALI 0.3 cm solid nodule. ? Met  -- Pt to be seen by GYN ONC  -- Will need Tissue Dx but Favor Ovarian CA Likely at least Stage III  -- Path could be obtained w Paracentesis +/- Omental implant Bx  liver lesions  ?mets  spoke to IR, will do a biopsy of peritoneal mass  discussed with her daughter   is high, need tissue to confirm the origin of the cancer     2. Normocytic Anemia    -- check iron profile, b12/folate, LDH  · Assessment	  1. Left Adnexal Mass w Extensive Peritoneal Carcinomatosis    -- : 652 and CEA :122 Ca 19.9 450  -- TV U/S w Large Ascites  -- CT Chest w NIRALI 0.3 cm solid nodule. ? Met  -- Pt to be seen by GYN ONC  -- Will need Tissue Dx but Favor Ovarian CA Likely at least Stage III  -- Path could be obtained w Paracentesis +/- Omental implant Bx  liver lesions  ?mets   biopsy of peritoneal mass was done  discussed with her daughter   and Ca 19.9  high, need tissue to confirm the origin of the cancer   agree to do GI w/u  2. Normocytic Anemia    -- check iron profile, b12/folate, LDH

## 2020-11-03 NOTE — PROGRESS NOTE ADULT - ATTENDING COMMENTS
HPI:  Patient, a 60 year old female with PMH of HTN, HLD, hypothyroidism presents to the ED with abdominal pain and distension for 2 weeks. She came from [place] 2y ago and reports upper abdominal pain and right shoulder pain that has been going intermittently for 2 weeks and worsening. It is associated with nausea but no vomiting, no loss of appetite or weight changed. Patient says her abdomen feels bloated. Denies fevers, loss of appetite, weight changes, night sweats, blood in stools. Endorses feeling weak and cold in the feet. According to daughter, she is independent, has no SOB and walks 1-2 miles every day. Patient never had EGD, colonoscopy. She went to  and got CXR and abdominal X-ray that were normal but was sent to ED since she was tachycardiac.    In ED, pt is in NAD. She received 20mg pepcid.    Vital Signs Last 24 Hrs  T(C): 36.9 (30 Oct 2020 15:34), Max: 36.9 (30 Oct 2020 15:34)  T(F): 98.5 (30 Oct 2020 15:34), Max: 98.5 (30 Oct 2020 15:34)  HR: 99 (30 Oct 2020 15:34) (99 - 110)  BP: 122/73 (30 Oct 2020 15:34) (120/75 - 122/73)  BP(mean): --  RR: 17 (30 Oct 2020 15:34) (16 - 17)  SpO2: 98% (30 Oct 2020 15:34) (98% - 98%)    GOC: Discussed with daughter, not ready to make a decision at this time. Will discuss with sister. Should be readdressed late. FULL CODE FOR NOW. 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 METASTASIS TO LIVER AND MESENTERY W/ LEFT LUNG NODULE - REVIEWED TVUS, REVIEWED CT CHEST, ELEVATED ,  & CEA, ONCOLOGY CONSULT IN PROGRESS, GYNECOLOGY CONSULT IN PROGRESS, GYN/ONC CONSULT IN PROGRESS  - UNDERWENT IR GUIDED FINE NEEDLE BIOPSY OF MASS  # NORMOCYTIC ANEMIA - IRON DEFICIENCY + ? ANEMIA OF CHRONIC DISEASE - S/P EGD/COLONOSCOPY [11/3] W/ HIATAL HERNIA AND ? TWISTED SIGMOID COLON - BIOPSIES TAKEN; GASTROENTEROLOGY CONSULT IN PROGRESS  # TRANSAMINITIS - MONITORING   # CHOLELITHIASIS  # HTN  # HYPOTHYROIDISM  # CASE DISCUSSED AT LENGTH WITH DAUGHTERS AT BEDSIDE - WILLIE [11/2]  # GI AND DVT PPX    TANIKA PIERCE M.D. COVERING BRIAN PIERCE M.D.

## 2020-11-03 NOTE — PROGRESS NOTE ADULT - ASSESSMENT
Patient, a 60 year old female with PMH of HTN, HLD, hypothyroidism presents to the ED with abdominal pain and distension for 2 weeks. USG liver showed cholelithiasis, no biliary ductal dilatation, mild to moderate ascites. CT abd showed extensive omental carcinomatosis and moderate ascites favoring a left ovarian primary malignancy, dominant conglomerate mass inferior to the liver consistent with omental or mesenteric implants.Hepatic serosal implants, small hepatic mass indeterminant for parenchymal hepatic metastasis.    11/2-Pt. seen and examined, interview via [facility]  #[id number].  Pt. denies pain however states she feels "water in my stomach" with change of position.  Pt. is followed by hem/onc and GYN/ONC, scheduled for IR FNA today.  GI Dr. Roman consulted for EGD/Colonoscopy tomorrow.    11/3-Underwent EGD/Colonoscopy today, tolerated well.  EGD showed small hiatal hernia, otherwise normal.  Colonoscopy showed ?tumor burden on rectum, Sigmoid Colon- 	Severe twist proximal sigmoid . ? partial volvulus vs external traction. Some inflammatory vs other tissue at this site biopsied. Could not remove due to anatomy and area marked with tattoo just distal to it.  Pt. has now completed malignancy w/u recommended by GYN/ONC, can likely discharge tomorrow to f/u with GYN/ONC Dr. Marilou Concepcion in 1-2 months.

## 2020-11-03 NOTE — PROGRESS NOTE ADULT - SUBJECTIVE AND OBJECTIVE BOX
Esophagogastroduodenoscopy Report  Indication: Abn CT  Referring MD:   Instrument:  #[id number]  Anesthesia: MAC  Consent:  Informed consent was obtained from the patient after providing any opportunity for questions  Procedure: The gastroscope was gently passed through the incisoral orifice into the oral cavity and under direct visualization the esophagus was intubated. The endoscope was passed down the esophagus, through the stomach and into proximal jejunum. Color, texture, mucosa and anatomy of the esophagus, stomach, and duodenum were carefully examined with the scope. The patient tolerated the procedure well. After completion of the examination, the patient was transferred to the recovery room.   Preparation: NPO   Findings:   Oropharynx	Normal  Esophagus	Normal except small HH  EG-junction	Normal  Cardia	Normal.  Body	Normal   Antrum	Normal bx obtained  Pylorus	Normal.  Duodenal Bulb	Normal   2nd portion	Normal  3rd portion	Normal.  Date and time:  EBL:0  Impression: Small HH otherwise normal    Plan: f/u biopsies                              Procedure Start Time: 12:57  Procedure End Time:   12:59                                  Attending:       Shan Roman M.D.   Date and Time:

## 2020-11-03 NOTE — PROGRESS NOTE ADULT - SUBJECTIVE AND OBJECTIVE BOX
Colonoscopy Report  Indication: Abnormal Imaging   Referring:    Instrument: [id number]   Anesthesia: MAC  Consent:  Informed consent was obtained from the patient after providing any opportunity for questions  Procedure: After placing the patient in the left lateral decubitus position, the colonoscope was gently inserted into the rectum and advanced to the cecum. Color, texture, mucosa, and anatomy of the colon were carefully examined with the scope. The patient tolerated the procedure well. After completion of the exam, the patient was transferred to the recovery room.     Preparation:  Findings:   Anal Canal	Normal  Rectum	? tumor burden externally  Sigmoid Colon 	Severe twist proximal sigmoid . ? partial volvulus vs external traction. Some inflammatory vs other tissue at this site biopsied. Could not remove due to anatomy and area marked with tattoo just distal to it.  Descending Colon	Normal  Splenic Flexure	Normal  Transverse Colon	Normal  Hepatic Flexure	Normal  Ascending Colon	 Normal  Cecum	Normal  Ileo-cecal Valve	Normal  Ileum 	  Date and time:  EBL:0    Impression:  Twist as described above                     ? tumor burden on rectum     Plan    f/U biopsies                       Procedure Start Time:  13:02  Cecum Reached Time: 13:10  Procedure End Time:   13:27  Total Withdrawal Time:                                 Attending:       Shan Roman M.D.   Date and Time:

## 2020-11-03 NOTE — PROGRESS NOTE ADULT - SUBJECTIVE AND OBJECTIVE BOX
NP Note discussed with  Primary Attending    Patient is a 60y old  Female who presents with a chief complaint of abn CT (03 Nov 2020 13:39)      INTERVAL HPI/OVERNIGHT EVENTS: no new complaints    MEDICATIONS  (STANDING):  atorvastatin 20 milliGRAM(s) Oral at bedtime  enoxaparin Injectable 40 milliGRAM(s) SubCutaneous daily  levothyroxine 25 MICROGram(s) Oral daily  losartan 25 milliGRAM(s) Oral daily    MEDICATIONS  (PRN):  acetaminophen   Tablet .. 650 milliGRAM(s) Oral every 6 hours PRN Temp greater or equal to 38C (100.4F)  acetaminophen   Tablet .. 650 milliGRAM(s) Oral every 4 hours PRN Mild Pain (1 - 3), Moderate Pain (4 - 6)      __________________________________________________  REVIEW OF SYSTEMS:  Comfortable with no complaints    CONSTITUTIONAL: No fever,   EYES: no acute visual disturbances  NECK: No pain or stiffness  RESPIRATORY: No cough; No shortness of breath  CARDIOVASCULAR: No chest pain, no palpitations  GASTROINTESTINAL: No pain. No nausea or vomiting; No diarrhea   NEUROLOGICAL: No headache or numbness, no tremors  MUSCULOSKELETAL: No joint pain, no muscle pain  GENITOURINARY: no dysuria, no frequency, no hesitancy  PSYCHIATRY: no depression , no anxiety  ALL OTHER  ROS negative        Vital Signs Last 24 Hrs  T(C): 36.8 (03 Nov 2020 05:25), Max: 37.1 (02 Nov 2020 20:18)  T(F): 98.2 (03 Nov 2020 05:25), Max: 98.8 (02 Nov 2020 20:18)  HR: 91 (03 Nov 2020 06:48) (65 - 107)  BP: 114/66 (03 Nov 2020 06:48) (95/56 - 114/66)  BP(mean): --  RR: 17 (03 Nov 2020 05:25) (17 - 17)  SpO2: 96% (03 Nov 2020 05:25) (96% - 98%)    ________________________________________________  PHYSICAL EXAM:  well developed, well groomed  GENERAL: NAD  HEENT: Normocephalic;  conjunctivae and sclerae clear; moist mucous membranes;   NECK : supple  CHEST/LUNG: Clear to auscultation bilaterally with good air entry   HEART: S1 S2  regular; no murmurs, gallops or rubs  ABDOMEN: Large and distended, Soft, Nontender,  EXTREMITIES: no cyanosis; no edema; no calf tenderness  SKIN: warm and dry; no rash  NERVOUS SYSTEM:  Awake and alert; Oriented  to place, person and time ; no new deficits    _________________________________________________  LABS:                        9.9    7.60  )-----------( 372      ( 03 Nov 2020 06:36 )             31.4     11-03    139  |  105  |  16  ----------------------------<  95  3.8   |  28  |  0.64    Ca    8.4      03 Nov 2020 06:36  Phos  3.3     11-02  Mg     2.4     11-02    TPro  6.6  /  Alb  2.2<L>  /  TBili  0.4  /  DBili  x   /  AST  117<H>  /  ALT  169<H>  /  AlkPhos  303<H>  11-03    PT/INR - ( 02 Nov 2020 12:30 )   PT: 15.1 sec;   INR: 1.28 ratio             CAPILLARY BLOOD GLUCOSE            RADIOLOGY & ADDITIONAL TESTS:    Imaging Personally Reviewed:  YES  Consultant(s) Notes Reviewed:   YES  Care Discussed with Consultants : GI, Hem/Onc    Plan of care was discussed with patient and /or primary care giver; all questions and concerns were addressed and care was aligned with patient's wishes.

## 2020-11-03 NOTE — PROGRESS NOTE ADULT - SUBJECTIVE AND OBJECTIVE BOX
feel ok  had FNA of abd mass  tolerated well.    Vital Signs Last 24 Hrs  T(C): 36.9 (30 Oct 2020 15:34), Max: 36.9 (30 Oct 2020 15:34)  T(F): 98.5 (30 Oct 2020 15:34), Max: 98.5 (30 Oct 2020 15:34)  HR: 99 (30 Oct 2020 15:34) (99 - 110)  BP: 122/73 (30 Oct 2020 15:34) (120/75 - 122/73)  BP(mean): --  RR: 17 (30 Oct 2020 15:34) (16 - 17)  SpO2: 98% (30 Oct 2020 15:34) (98% - 98%)    GOC: Discussed with daughter, not ready to make a decision at this time. Will discuss with sister. Should be readdressed late. FULL CODE FOR NOW. (30 Oct 2020 18:01)     Pt is seen and examined  pt is awake and lying in bed/out of bed to chair  pt seems comfortable and denies any complaints at this time    ROS:  Negative except for:    MEDICATIONS  (STANDING):  atorvastatin 20 milliGRAM(s) Oral at bedtime  enoxaparin Injectable 40 milliGRAM(s) SubCutaneous daily  levothyroxine 25 MICROGram(s) Oral daily  losartan 25 milliGRAM(s) Oral daily    MEDICATIONS  (PRN):  acetaminophen   Tablet .. 650 milliGRAM(s) Oral every 6 hours PRN Temp greater or equal to 38C (100.4F)  acetaminophen   Tablet .. 650 milliGRAM(s) Oral every 4 hours PRN Mild Pain (1 - 3), Moderate Pain (4 - 6)      Allergies    No Known Allergies    Intolerances        Vital Signs Last 24 Hrs  T(C): 36.8 (03 Nov 2020 05:25), Max: 37.2 (02 Nov 2020 13:45)  T(F): 98.2 (03 Nov 2020 05:25), Max: 99 (02 Nov 2020 13:45)  HR: 91 (03 Nov 2020 06:48) (65 - 107)  BP: 114/66 (03 Nov 2020 06:48) (95/56 - 114/66)  BP(mean): --  RR: 17 (03 Nov 2020 05:25) (17 - 18)  SpO2: 96% (03 Nov 2020 05:25) (96% - 98%)    PHYSICAL EXAM  General: adult in NAD  HEENT: clear oropharynx, anicteric sclera, pink conjunctiva  Neck: supple  CV: normal S1/S2 with no murmur rubs or gallops  Lungs: positive air movement b/l ant lungs,clear to auscultation, no wheezes, no rales  Abdomen: soft non-tender non-distended, no hepatosplenomegaly  Ext: no clubbing cyanosis or edema  Skin: no rashes and no petechiae  Neuro: alert and oriented X 4, no focal deficits  LABS:                          9.9    7.60  )-----------( 372      ( 03 Nov 2020 06:36 )             31.4         Mean Cell Volume : 89.0 fl  Mean Cell Hemoglobin : 28.0 pg  Mean Cell Hemoglobin Concentration : 31.5 gm/dL  Auto Neutrophil # : x  Auto Lymphocyte # : x  Auto Monocyte # : x  Auto Eosinophil # : x  Auto Basophil # : x  Auto Neutrophil % : x  Auto Lymphocyte % : x  Auto Monocyte % : x  Auto Eosinophil % : x  Auto Basophil % : x    Serial CBC  Hematocrit 31.4  Hemoglobin 9.9  Plat 372  RBC 3.53  WBC 7.60  Serial CBC  Hematocrit 31.6  Hemoglobin 9.9  Plat 365  RBC 3.56  WBC 7.86  Serial CBC  Hematocrit 31.3  Hemoglobin 9.8  Plat 335  RBC 3.48  WBC 7.14  Serial CBC  Hematocrit 31.5  Hemoglobin 10.1  Plat 357  RBC 3.53  WBC 7.26  Serial CBC  Hematocrit 35.1  Hemoglobin 11.0  Plat 384  RBC 3.91  WBC 8.39    11-03    139  |  105  |  16  ----------------------------<  95  3.8   |  28  |  0.64    Ca    8.4      03 Nov 2020 06:36  Phos  3.3     11-02  Mg     2.4     11-02    TPro  6.6  /  Alb  2.2<L>  /  TBili  0.4  /  DBili  x   /  AST  117<H>  /  ALT  169<H>  /  AlkPhos  303<H>  11-03      PT/INR - ( 02 Nov 2020 12:30 )   PT: 15.1 sec;   INR: 1.28 ratio             Ferritin, Serum: 777 ng/mL (11-01 @ 12:11)  Vitamin B12, Serum: 460 pg/mL (11-01 @ 12:11)  Folate, Serum: 12.5 ng/mL (11-01 @ 12:11)  Iron - Total Binding Capacity.: 141 ug/dL (11-01 @ 05:58)  Reticulocyte Percent: 1.2 % (11-01 @ 05:58)  Folate, Serum: 15.8 ng/mL (10-31 @ 12:26)  Vitamin B12, Serum: 414 pg/mL (10-31 @ 12:26)            BLOOD SMEAR INTERPRETATION:       RADIOLOGY & ADDITIONAL STUDIES:

## 2020-11-04 ENCOUNTER — TRANSCRIPTION ENCOUNTER (OUTPATIENT)
Age: 60
End: 2020-11-04

## 2020-11-04 VITALS
RESPIRATION RATE: 16 BRPM | DIASTOLIC BLOOD PRESSURE: 61 MMHG | TEMPERATURE: 99 F | OXYGEN SATURATION: 99 % | HEART RATE: 106 BPM | SYSTOLIC BLOOD PRESSURE: 109 MMHG

## 2020-11-04 DIAGNOSIS — C78.6 SECONDARY MALIGNANT NEOPLASM OF RETROPERITONEUM AND PERITONEUM: ICD-10-CM

## 2020-11-04 PROBLEM — I10 ESSENTIAL (PRIMARY) HYPERTENSION: Chronic | Status: ACTIVE | Noted: 2020-10-30

## 2020-11-04 LAB
ANION GAP SERPL CALC-SCNC: 7 MMOL/L — SIGNIFICANT CHANGE UP (ref 5–17)
BUN SERPL-MCNC: 15 MG/DL — SIGNIFICANT CHANGE UP (ref 7–18)
CALCIUM SERPL-MCNC: 8.5 MG/DL — SIGNIFICANT CHANGE UP (ref 8.4–10.5)
CHLORIDE SERPL-SCNC: 104 MMOL/L — SIGNIFICANT CHANGE UP (ref 96–108)
CO2 SERPL-SCNC: 27 MMOL/L — SIGNIFICANT CHANGE UP (ref 22–31)
CREAT SERPL-MCNC: 0.61 MG/DL — SIGNIFICANT CHANGE UP (ref 0.5–1.3)
GLUCOSE SERPL-MCNC: 95 MG/DL — SIGNIFICANT CHANGE UP (ref 70–99)
HCT VFR BLD CALC: 29.5 % — LOW (ref 34.5–45)
HGB BLD-MCNC: 9.4 G/DL — LOW (ref 11.5–15.5)
MCHC RBC-ENTMCNC: 28.2 PG — SIGNIFICANT CHANGE UP (ref 27–34)
MCHC RBC-ENTMCNC: 31.9 GM/DL — LOW (ref 32–36)
MCV RBC AUTO: 88.6 FL — SIGNIFICANT CHANGE UP (ref 80–100)
NRBC # BLD: 0 /100 WBCS — SIGNIFICANT CHANGE UP (ref 0–0)
PLATELET # BLD AUTO: 343 K/UL — SIGNIFICANT CHANGE UP (ref 150–400)
POTASSIUM SERPL-MCNC: 3.9 MMOL/L — SIGNIFICANT CHANGE UP (ref 3.5–5.3)
POTASSIUM SERPL-SCNC: 3.9 MMOL/L — SIGNIFICANT CHANGE UP (ref 3.5–5.3)
RBC # BLD: 3.33 M/UL — LOW (ref 3.8–5.2)
RBC # FLD: 12.1 % — SIGNIFICANT CHANGE UP (ref 10.3–14.5)
SODIUM SERPL-SCNC: 138 MMOL/L — SIGNIFICANT CHANGE UP (ref 135–145)
WBC # BLD: 8.63 K/UL — SIGNIFICANT CHANGE UP (ref 3.8–10.5)
WBC # FLD AUTO: 8.63 K/UL — SIGNIFICANT CHANGE UP (ref 3.8–10.5)

## 2020-11-04 PROCEDURE — 80061 LIPID PANEL: CPT

## 2020-11-04 PROCEDURE — 86803 HEPATITIS C AB TEST: CPT

## 2020-11-04 PROCEDURE — 86301 IMMUNOASSAY TUMOR CA 19-9: CPT

## 2020-11-04 PROCEDURE — 82009 KETONE BODYS QUAL: CPT

## 2020-11-04 PROCEDURE — 84436 ASSAY OF TOTAL THYROXINE: CPT

## 2020-11-04 PROCEDURE — 86769 SARS-COV-2 COVID-19 ANTIBODY: CPT

## 2020-11-04 PROCEDURE — 85045 AUTOMATED RETICULOCYTE COUNT: CPT

## 2020-11-04 PROCEDURE — 99285 EMERGENCY DEPT VISIT HI MDM: CPT

## 2020-11-04 PROCEDURE — 71250 CT THORAX DX C-: CPT

## 2020-11-04 PROCEDURE — 87040 BLOOD CULTURE FOR BACTERIA: CPT

## 2020-11-04 PROCEDURE — 81003 URINALYSIS AUTO W/O SCOPE: CPT

## 2020-11-04 PROCEDURE — 84100 ASSAY OF PHOSPHORUS: CPT

## 2020-11-04 PROCEDURE — 99232 SBSQ HOSP IP/OBS MODERATE 35: CPT

## 2020-11-04 PROCEDURE — 86304 IMMUNOASSAY TUMOR CA 125: CPT

## 2020-11-04 PROCEDURE — 82607 VITAMIN B-12: CPT

## 2020-11-04 PROCEDURE — 84484 ASSAY OF TROPONIN QUANT: CPT

## 2020-11-04 PROCEDURE — 76856 US EXAM PELVIC COMPLETE: CPT

## 2020-11-04 PROCEDURE — 80048 BASIC METABOLIC PNL TOTAL CA: CPT

## 2020-11-04 PROCEDURE — 83036 HEMOGLOBIN GLYCOSYLATED A1C: CPT

## 2020-11-04 PROCEDURE — 84443 ASSAY THYROID STIM HORMONE: CPT

## 2020-11-04 PROCEDURE — 83540 ASSAY OF IRON: CPT

## 2020-11-04 PROCEDURE — 96374 THER/PROPH/DIAG INJ IV PUSH: CPT

## 2020-11-04 PROCEDURE — 84480 ASSAY TRIIODOTHYRONINE (T3): CPT

## 2020-11-04 PROCEDURE — 83615 LACTATE (LD) (LDH) ENZYME: CPT

## 2020-11-04 PROCEDURE — 76830 TRANSVAGINAL US NON-OB: CPT

## 2020-11-04 PROCEDURE — 77012 CT SCAN FOR NEEDLE BIOPSY: CPT

## 2020-11-04 PROCEDURE — 82746 ASSAY OF FOLIC ACID SERUM: CPT

## 2020-11-04 PROCEDURE — 85610 PROTHROMBIN TIME: CPT

## 2020-11-04 PROCEDURE — 82728 ASSAY OF FERRITIN: CPT

## 2020-11-04 PROCEDURE — 74177 CT ABD & PELVIS W/CONTRAST: CPT

## 2020-11-04 PROCEDURE — 36415 COLL VENOUS BLD VENIPUNCTURE: CPT

## 2020-11-04 PROCEDURE — 80053 COMPREHEN METABOLIC PANEL: CPT

## 2020-11-04 PROCEDURE — 87635 SARS-COV-2 COVID-19 AMP PRB: CPT

## 2020-11-04 PROCEDURE — 85025 COMPLETE CBC W/AUTO DIFF WBC: CPT

## 2020-11-04 PROCEDURE — 85027 COMPLETE CBC AUTOMATED: CPT

## 2020-11-04 PROCEDURE — 83735 ASSAY OF MAGNESIUM: CPT

## 2020-11-04 PROCEDURE — 83550 IRON BINDING TEST: CPT

## 2020-11-04 PROCEDURE — 20206 BIOPSY MUSCLE PERQ NEEDLE: CPT

## 2020-11-04 PROCEDURE — 88305 TISSUE EXAM BY PATHOLOGIST: CPT

## 2020-11-04 PROCEDURE — 88312 SPECIAL STAINS GROUP 1: CPT

## 2020-11-04 PROCEDURE — 82378 CARCINOEMBRYONIC ANTIGEN: CPT

## 2020-11-04 PROCEDURE — 83690 ASSAY OF LIPASE: CPT

## 2020-11-04 PROCEDURE — 93005 ELECTROCARDIOGRAM TRACING: CPT

## 2020-11-04 PROCEDURE — 76705 ECHO EXAM OF ABDOMEN: CPT

## 2020-11-04 RX ORDER — FERROUS SULFATE 325(65) MG
325 TABLET ORAL DAILY
Refills: 0 | Status: DISCONTINUED | OUTPATIENT
Start: 2020-11-04 | End: 2020-11-04

## 2020-11-04 RX ORDER — LEVOTHYROXINE SODIUM 125 MCG
1 TABLET ORAL
Qty: 0 | Refills: 0 | DISCHARGE

## 2020-11-04 RX ORDER — ACETAMINOPHEN 500 MG
2 TABLET ORAL
Qty: 0 | Refills: 0 | DISCHARGE
Start: 2020-11-04

## 2020-11-04 RX ORDER — FERROUS SULFATE 325(65) MG
1 TABLET ORAL
Qty: 30 | Refills: 0
Start: 2020-11-04

## 2020-11-04 RX ADMIN — Medication 25 MICROGRAM(S): at 05:11

## 2020-11-04 RX ADMIN — LOSARTAN POTASSIUM 25 MILLIGRAM(S): 100 TABLET, FILM COATED ORAL at 05:11

## 2020-11-04 RX ADMIN — Medication 325 MILLIGRAM(S): at 14:38

## 2020-11-04 NOTE — DISCHARGE NOTE NURSING/CASE MANAGEMENT/SOCIAL WORK - PATIENT PORTAL LINK FT
You can access the FollowMyHealth Patient Portal offered by [facility] by registering at the following website: [email or web address]. By joining [employer]’s FollowMyHealth portal, you will also be able to view your health information using other applications (apps) compatible with our system.

## 2020-11-04 NOTE — PROGRESS NOTE ADULT - PROBLEM SELECTOR PLAN 1
normocytic anemia - Iron studies consistent with iron deficiency.   Iron supplementation  s/p colonoscopy yesterday showing severe twist proximal sigmoid and EGD showing small HH  f/u bx's
-CT scan showed extensive omental carcinomatosis, left adnexal mass and small hepatic mass concerning for ovarian malignancy with mets to liver  -CT chest showed  0.3 cm solid nodule involving the upper portion of the left major fissure.  -Hem/onc Dr. Kyle following  -Pt. for IR Omental Implant Biopsy today  -GYN/ONC following  -Pt. for EGD/Col tomorrow
-CT scan showed extensive omental carcinomatosis, left adnexal mass and small hepatic mass concerning for ovarian malignancy with mets to liver  -CT chest showed  0.3 cm solid nodule involving the upper portion of the left major fissure.  -Hem/onc Dr. Kyle following  -Pt. underwent IR Omental Implant Biopsy 11/2  -Underwent EGD/Colonoscopy today 11/3  -GYN/ONC following  -Pt. to f/u with Dr. Marilou Concepcion in 1-2mons as OP
s/p IR guided biopsy 11/2.   f/u with GYN/ONC Dr. Marilou Concepcion in 1-2 months.

## 2020-11-04 NOTE — PROGRESS NOTE ADULT - PROBLEM SELECTOR PLAN 7
dc planning home with outpatient follow up dc planning home with outpatient follow up  Appointments made, d/w daugther aware.

## 2020-11-04 NOTE — PROGRESS NOTE ADULT - SUBJECTIVE AND OBJECTIVE BOX
NP Note     HPI:  Patient, a 60 year old female with PMH of HTN, HLD, hypothyroidism presented to the ED with abdominal pain and distension for 2 weeks. Found with extensive peritoneal carcinomatosis, s/p biopsy of omental implant by IR. s/p EGD/colonoscopy  with biopsy.         INTERVAL HPI/OVERNIGHT EVENTS: no new complaints    MEDICATIONS  (STANDING):  atorvastatin 20 milliGRAM(s) Oral at bedtime  enoxaparin Injectable 40 milliGRAM(s) SubCutaneous daily  levothyroxine 25 MICROGram(s) Oral daily  losartan 25 milliGRAM(s) Oral daily    MEDICATIONS  (PRN):  acetaminophen   Tablet .. 650 milliGRAM(s) Oral every 6 hours PRN Temp greater or equal to 38C (100.4F)  acetaminophen   Tablet .. 650 milliGRAM(s) Oral every 4 hours PRN Mild Pain (1 - 3), Moderate Pain (4 - 6)      __________________________________________________  REVIEW OF SYSTEMS:    CONSTITUTIONAL: No fever,   EYES: no acute visual disturbances  NECK: No pain or stiffness  RESPIRATORY: No cough; No shortness of breath  CARDIOVASCULAR: No chest pain, no palpitations  GASTROINTESTINAL: No pain. No nausea or vomiting; No diarrhea   NEUROLOGICAL: No headache or numbness, no tremors  MUSCULOSKELETAL: No joint pain, no muscle pain  GENITOURINARY: no dysuria, no frequency, no hesitancy  PSYCHIATRY: no depression , no anxiety  ALL OTHER  ROS negative        Vital Signs Last 24 Hrs  T(C): 36.6 (04 Nov 2020 05:05), Max: 37.2 (03 Nov 2020 18:35)  T(F): 97.9 (04 Nov 2020 05:05), Max: 98.9 (03 Nov 2020 18:35)  HR: 70 (04 Nov 2020 05:05) (68 - 74)  BP: 149/41 (04 Nov 2020 05:05) (110/71 - 149/41)  BP(mean): --  RR: 16 (04 Nov 2020 05:05) (16 - 18)  SpO2: 100% (04 Nov 2020 05:05) (97% - 100%)    ________________________________________________  PHYSICAL EXAM:  GENERAL: NAD  HEENT: Normocephalic;  conjunctivae and sclerae clear; moist mucous membranes;   NECK : supple  CHEST/LUNG: Clear to auscultation bilaterally with good air entry   HEART: S1 S2  regular; no murmurs, gallops or rubs  ABDOMEN: Soft, Nontender, distended; Bowel sounds present  EXTREMITIES: no cyanosis; no edema; no calf tenderness  SKIN: warm and dry; no rash  NERVOUS SYSTEM:  Awake and alert; Oriented  to place, person and time ; no new deficits    _________________________________________________  LABS:                        9.4    8.63  )-----------( 343      ( 04 Nov 2020 07:16 )             29.5     11-04    138  |  104  |  15  ----------------------------<  95  3.9   |  27  |  0.61    Ca    8.5      04 Nov 2020 07:16    TPro  6.6  /  Alb  2.2<L>  /  TBili  0.4  /  DBili  x   /  AST  117<H>  /  ALT  169<H>  /  AlkPhos  303<H>  11-03    PT/INR - ( 02 Nov 2020 12:30 )   PT: 15.1 sec;   INR: 1.28 ratio             CAPILLARY BLOOD GLUCOSE            RADIOLOGY & ADDITIONAL TESTS:    Imaging  Reviewed:  YES/NO    Consultant(s) Notes Reviewed:   YES/ No      Plan of care was discussed with patient and /or primary care giver; all questions and concerns were addressed

## 2020-11-04 NOTE — DISCHARGE NOTE PROVIDER - CARE PROVIDER_API CALL
Adiel Kyle  INTERNAL MEDICINE  [address]  [place] [id number]  Phone: [phone]  Fax: [phone]  Follow Up Time:     Marilou Concepcion)  Gynecologic Oncology; Obstetrics and Gynecology  [address] [place] [address]  Phone: [phone]  Fax: [phone]  Follow Up Time:

## 2020-11-04 NOTE — PROGRESS NOTE ADULT - PROBLEM SELECTOR PLAN 2
with Left Adnexal mass   and Ca 19.9  high, need tissue to confirm the origin of the cancer   Hem/onc following
Pt with ascites on CT and USG, clinically with large, distended abdomen,  likely due to malignancy   -Currently with no associated symptoms  -Will follow hem/onc reccs
Pt with ascites on CT and USG, clinically with large, distended abdomen,  likely due to malignancy   -Currently with no associated symptoms  -Will follow hem/onc reccs
asymptomatic

## 2020-11-04 NOTE — PROGRESS NOTE ADULT - SUBJECTIVE AND OBJECTIVE BOX
GI PROGRESS NOTE    Patient is a 60y old  Female who presents with a chief complaint of abdominal pain (04 Nov 2020 11:27)      HPI:  Patient, a 60 year old female with PMH of HTN, HLD, hypothyroidism presents to the ED with abdominal pain and distension for 2 weeks. She came from [place] 2y ago and reports upper abdominal pain and right shoulder pain that has been going intermittently for 2 weeks and worsening. It is associated with nausea but no vomiting, no loss of appetite or weight changed. Patient says her abdomen feels bloated. Denies fevers, loss of appetite, weight changes, night sweats, blood in stools. Endorses feeling weak and cold in the feet. According to daughter, she is independent, has no SOB and walks 1-2 miles every day. Patient never had EGD, colonoscopy. She went to  and got CXR and abdominal X-ray that were normal but was sent to ED since she was tachycardiac.    In ED, pt is in NAD. She received 20mg pepcid.    Vital Signs Last 24 Hrs  T(C): 36.9 (30 Oct 2020 15:34), Max: 36.9 (30 Oct 2020 15:34)  T(F): 98.5 (30 Oct 2020 15:34), Max: 98.5 (30 Oct 2020 15:34)  HR: 99 (30 Oct 2020 15:34) (99 - 110)  BP: 122/73 (30 Oct 2020 15:34) (120/75 - 122/73)  BP(mean): --  RR: 17 (30 Oct 2020 15:34) (16 - 17)  SpO2: 98% (30 Oct 2020 15:34) (98% - 98%)    GOC: Discussed with daughter, not ready to make a decision at this time. Will discuss with sister. Should be readdressed late. FULL CODE FOR NOW. (30 Oct 2020 18:01)          ______________________________________________________________________  PMH/PSH:  PAST MEDICAL & SURGICAL HISTORY:  Hypothyroid    Hypercholesterolemia    HTN (hypertension)    No significant past surgical history      ______________________________________________________________________  MEDS:  MEDICATIONS  (STANDING):  atorvastatin 20 milliGRAM(s) Oral at bedtime  enoxaparin Injectable 40 milliGRAM(s) SubCutaneous daily  ferrous    sulfate 325 milliGRAM(s) Oral daily  levothyroxine 25 MICROGram(s) Oral daily  losartan 25 milliGRAM(s) Oral daily    MEDICATIONS  (PRN):  acetaminophen   Tablet .. 650 milliGRAM(s) Oral every 6 hours PRN Temp greater or equal to 38C (100.4F)  acetaminophen   Tablet .. 650 milliGRAM(s) Oral every 4 hours PRN Mild Pain (1 - 3), Moderate Pain (4 - 6)    ______________________________________________________________________  ALL:   Allergies    No Known Allergies    Intolerances      ______________________________________________________________________  SH: Social History:  denies smoking, drinking or other drugs (30 Oct 2020 18:01)    ______________________________________________________________________  FH:  FAMILY HISTORY:  No pertinent family history in first degree relatives      ______________________________________________________________________  ROS:    CONSTITUTIONAL:  No weight loss, fever, chills, weakness or fatigue.    HEENT:  Eyes:  No visual loss, blurred vision, double vision or yellow sclerae. Ears, Nose, Throat:  No hearing loss, sneezing, congestion, runny nose or sore throat.    SKIN:  No rash or itching.    CARDIOVASCULAR:  No chest pain, chest pressure or chest discomfort. No palpitations or edema.    RESPIRATORY:  No shortness of breath, cough or sputum.    GASTROINTESTINAL:  SEE HPI    GENITOURINARY:  No dysuria, hematuria, urinary frequency    NEUROLOGICAL:  No headache, dizziness, syncope, paralysis, ataxia, numbness or tingling in the extremities. No change in bowel or bladder control.    MUSCULOSKELETAL:  No muscle, back pain, joint pain or stiffness.    HEMATOLOGIC:  No anemia, bleeding or bruising.    LYMPHATICS:  No enlarged nodes. No history of splenectomy.    PSYCHIATRIC:  No history of depression or anxiety.    ENDOCRINOLOGIC:  No reports of sweating, cold or heat intolerance. No polyuria or polydipsia.    ALLERGIES:  No history of asthma, hives, eczema or rhinitis.  ______________________________________________________________________  PHYSICAL EXAM:  T(C): 36.6 (11-04-20 @ 05:05), Max: 37.2 (11-03-20 @ 18:35)  HR: 70 (11-04-20 @ 05:05)  BP: 149/41 (11-04-20 @ 05:05)  RR: 16 (11-04-20 @ 05:05)  SpO2: 100% (11-04-20 @ 05:05)  Wt(kg): --      GEN: NAD   CVS- S1 S2  ABD: soft nontender, non distended, bowel sounds+  LUNGS: clear to auscultation  NEURO: noin focal neuro exam; AAO x 3  Extremities: no cyanosis, no calf tenderness, no edema, no clubbing      ______________________________________________________________________  LABS:                        9.4    8.63  )-----------( 343      ( 04 Nov 2020 07:16 )             29.5     11-04    138  |  104  |  15  ----------------------------<  95  3.9   |  27  |  0.61    Ca    8.5      04 Nov 2020 07:16    TPro  6.6  /  Alb  2.2<L>  /  TBili  0.4  /  DBili  x   /  AST  117<H>  /  ALT  169<H>  /  AlkPhos  303<H>  11-03    LIVER FUNCTIONS - ( 03 Nov 2020 06:36 )  Alb: 2.2 g/dL / Pro: 6.6 g/dL / ALK PHOS: 303 U/L / ALT: 169 U/L DA / AST: 117 U/L / GGT: x           ______________________________________________________________________

## 2020-11-04 NOTE — DISCHARGE NOTE PROVIDER - NSDCCPCAREPLAN_GEN_ALL_CORE_FT
PRINCIPAL DISCHARGE DIAGNOSIS  Diagnosis: Carcinomatosis peritonei  Assessment and Plan of Treatment: You were found with extensive carcinomatosis      SECONDARY DISCHARGE DIAGNOSES  Diagnosis: Ovarian cancer  Assessment and Plan of Treatment:     Diagnosis: Tachycardia  Assessment and Plan of Treatment:     Diagnosis: Ascites  Assessment and Plan of Treatment:      PRINCIPAL DISCHARGE DIAGNOSIS  Diagnosis: Peritoneal carcinomatosis  Assessment and Plan of Treatment: You have been found with large tumor in  your abdominal membrane. Please follow up with dr. Kyle in his office on Monday 11/9 at 12:45pm      SECONDARY DISCHARGE DIAGNOSES  Diagnosis: Anemia  Assessment and Plan of Treatment: Anemia of iron deficiency. follow up with    Diagnosis: Hypothyroid  Assessment and Plan of Treatment: continue synthroid. follow up with  you pmd as before.    Diagnosis: HTN (hypertension)  Assessment and Plan of Treatment: continue home medications    Diagnosis: Cholelithiasis  Assessment and Plan of Treatment: You were found to have gallstones- no intervention at present as you do not have any symptoms.     PRINCIPAL DISCHARGE DIAGNOSIS  Diagnosis: Peritoneal carcinomatosis  Assessment and Plan of Treatment: You have been found with large tumor in  your abdominal membrane. Please follow up with dr. Kyle in his office on Monday 11/9 at 12:45pm.  Follow up with dr. Concepcion (GYN oncologist) in 1 month.      SECONDARY DISCHARGE DIAGNOSES  Diagnosis: Adnexal mass  Assessment and Plan of Treatment: You have a mass on ovary. Follow up with dr. Kyle for pathology results.    Diagnosis: Anemia  Assessment and Plan of Treatment: Anemia of iron deficiency. follow up with    Diagnosis: Hypothyroid  Assessment and Plan of Treatment: continue synthroid. follow up with  you pmd as before.    Diagnosis: HTN (hypertension)  Assessment and Plan of Treatment: continue home medications    Diagnosis: Cholelithiasis  Assessment and Plan of Treatment: You were found to have gallstones- no intervention at present as you do not have any symptoms.     PRINCIPAL DISCHARGE DIAGNOSIS  Diagnosis: Peritoneal carcinomatosis  Assessment and Plan of Treatment: You have been found with large tumor in  your abdominal membrane. Please follow up with dr. Kyle in his office on Monday 11/9 at 12:45pm.  Follow up with dr. Concepcion (GYN oncologist) in 1 month on 12/7 at 10:30 am at [address] [facility], 2nd floor, [facility], tel [phone]      SECONDARY DISCHARGE DIAGNOSES  Diagnosis: Adnexal mass  Assessment and Plan of Treatment: You have a mass on ovary. Follow up with dr. Kyle for pathology results.    Diagnosis: Anemia  Assessment and Plan of Treatment: Anemia of iron deficiency. follow up with    Diagnosis: Hypothyroid  Assessment and Plan of Treatment: continue synthroid. follow up with  you pmd as before.    Diagnosis: HTN (hypertension)  Assessment and Plan of Treatment: continue home medications    Diagnosis: Cholelithiasis  Assessment and Plan of Treatment: You were found to have gallstones- no intervention at present as you do not have any symptoms.

## 2020-11-04 NOTE — DISCHARGE NOTE PROVIDER - NSDCMRMEDTOKEN_GEN_ALL_CORE_FT
atorvastatin 10 mg oral tablet: 1 tab(s) orally once a day  levothyroxine 13 mcg (0.013 mg) oral capsule: 1 cap(s) orally once a day  telmisartan 20 mg oral tablet: 1 tab(s) orally once a day   acetaminophen 325 mg oral tablet: 2 tab(s) orally every 4 hours, As needed, Mild Pain (1 - 3), Moderate Pain (4 - 6)  atorvastatin 10 mg oral tablet: 1 tab(s) orally once a day  levothyroxine 13 mcg (0.013 mg) oral capsule: 1 cap(s) orally once a day  telmisartan 20 mg oral tablet: 1 tab(s) orally once a day   acetaminophen 325 mg oral tablet: 2 tab(s) orally every 4 hours, As needed, Mild Pain (1 - 3), Moderate Pain (4 - 6)  atorvastatin 10 mg oral tablet: 1 tab(s) orally once a day  ferrous sulfate 325 mg (65 mg elemental iron) oral tablet: 1 tab(s) orally once a day  levothyroxine 50 mcg (0.05 mg) oral tablet: 1 tab(s) orally once a day  telmisartan 20 mg oral tablet: 1 tab(s) orally once a day

## 2020-11-04 NOTE — DISCHARGE NOTE PROVIDER - HOSPITAL COURSE
Patient seen and examined in am  in short - admitted with peritoneal carcinomatosis, noted to have elevated tumor markers  abdomen soft/distended, ascites  plan  recommend IR guided biopsy to evaluate gyn vs. GI primary  colonoscopy/endoscopy - elevated CEA  discussed plan with daughter and patient with  # [id number]  plan discussed with Dr. Kyle for neoadjuvant chemo  all questions answered  f/u in office 1- 2 months  Marilou Concepcion MD.           Patient, a 60 year old female with PMH of HTN, HLD, hypothyroidism presented to the ED with abdominal pain and distension for 2 weeks. Found with extensive peritoneal carcinomatosis, suspected left ovarian ca (found with left adnexal mass) with metastasis to liver, mesentery with left lung nodule, elevated tumor markers. +ascites. s/p biopsy of adnexal mass by IR, pathology pending.  s/p EGD/colonoscopy  with biopsy- revealed potential rectal tumor burden. Oncology, GYN/ONC and GI consulted. Found with iron deficiency anemia, suspecting anemia of chronic disease. Hypertension controlled, on synthroid for hypothyroid.       Patient to be discharged home today with outpatient follow up with dr. Kyle  on Monday 11/9 (likely neoadjuvant chemo) and dr. Concepcion in 1 month.   This a brief hospital summary - please see chart for full course.

## 2020-11-04 NOTE — PROGRESS NOTE ADULT - ASSESSMENT
Patient, a 60 year old female with PMH of HTN, HLD, hypothyroidism presents to the ED with abdominal pain and distension for 2 weeks. USG liver showed cholelithiasis, no biliary ductal dilatation, mild to moderate ascites.     11/2-Pt. seen and examined, interview via [employer]  #[id number].  Pt. denies pain however states she feels "water in my stomach" with change of position.  Pt. is followed by hem/onc and GYN/ONC, scheduled for IR FNA today.  GI Dr. Roman consulted for EGD/Colonoscopy tomorrow.    11/3-Underwent EGD/Colonoscopy today, tolerated well.  EGD showed small hiatal hernia, otherwise normal.  Colonoscopy showed ?tumor burden on rectum, Sigmoid Colon- 	Severe twist proximal sigmoid . ? partial volvulus vs external traction. Some inflammatory vs other tissue at this site biopsied. Could not remove due to anatomy and area marked with tattoo just distal to it.  Pt. has now completed malignancy w/u recommended by GYN/ONC, can likely discharge tomorrow to

## 2020-11-04 NOTE — PROGRESS NOTE ADULT - ASSESSMENT
· Assessment	  1. Left Adnexal Mass w Extensive Peritoneal Carcinomatosis    -- : 652 and CEA :122  -- TV U/S w Large Ascites  -- CT Chest w NIRALI 0.3 cm solid nodule. ? Met  -- Pt to be seen by GYN ONC  -- Will need Tissue Dx but Favor Ovarian CA Likely at least Stage III  -- Path could be obtained w Paracentesis +/- Omental implant Bx  liver lesions  ?mets  spoke to IR, will do a biopsy of peritoneal mass  discussed with her daughter   is high, need tissue to confirm the origin of the cancer 2. Normocytic Anemia    -- check iron profile, b12/folate, LDH  · Assessment	  1. Left Adnexal Mass w Extensive Peritoneal Carcinomatosis    -- : 652 and CEA :122 Ca 19.9 450  -- TV U/S w Large Ascites  -- CT Chest w NIRALI 0.3 cm solid nodule. ? Met  -- Pt to be seen by GYN ONC  -- Will need Tissue Dx but Favor Ovarian CA Likely at least Stage III  -- Path could be obtained w Paracentesis +/- Omental implant Bx  liver lesions  ?mets   biopsy of peritoneal mass was done  discussed with her daughter  GI w/u neg for cancer so far   and Ca 19.9  high, need tissue to confirm the origin of the cancer   2. Normocytic Anemia    -- check iron profile, b12/folate, LDH

## 2020-11-04 NOTE — PROGRESS NOTE ADULT - REASON FOR ADMISSION
abdominal pain
abdominal pain
abn CT
abn ct scan
abdominal pain

## 2020-11-04 NOTE — PROGRESS NOTE ADULT - SUBJECTIVE AND OBJECTIVE BOX
condition same  no pain, fever  able to eat normally  has regular BM    Vital Signs Last 24 Hrs  T(C): 36.9 (30 Oct 2020 15:34), Max: 36.9 (30 Oct 2020 15:34)  T(F): 98.5 (30 Oct 2020 15:34), Max: 98.5 (30 Oct 2020 15:34)  HR: 99 (30 Oct 2020 15:34) (99 - 110)  BP: 122/73 (30 Oct 2020 15:34) (120/75 - 122/73)  BP(mean): --  RR: 17 (30 Oct 2020 15:34) (16 - 17)  SpO2: 98% (30 Oct 2020 15:34) (98% - 98%)    GOC: Discussed with daughter, not ready to make a decision at this time. Will discuss with sister. Should be readdressed late. FULL CODE FOR NOW. (30 Oct 2020 18:01)     Pt is seen and examined  pt is awake and lying in bed/out of bed to chair  pt seems comfortable and denies any complaints at this time    ROS:  Negative except for:    MEDICATIONS  (STANDING):  atorvastatin 20 milliGRAM(s) Oral at bedtime  enoxaparin Injectable 40 milliGRAM(s) SubCutaneous daily  levothyroxine 25 MICROGram(s) Oral daily  losartan 25 milliGRAM(s) Oral daily    MEDICATIONS  (PRN):  acetaminophen   Tablet .. 650 milliGRAM(s) Oral every 6 hours PRN Temp greater or equal to 38C (100.4F)  acetaminophen   Tablet .. 650 milliGRAM(s) Oral every 4 hours PRN Mild Pain (1 - 3), Moderate Pain (4 - 6)      Allergies    No Known Allergies    Intolerances        Vital Signs Last 24 Hrs  T(C): 36.6 (04 Nov 2020 05:05), Max: 37.2 (03 Nov 2020 18:35)  T(F): 97.9 (04 Nov 2020 05:05), Max: 98.9 (03 Nov 2020 18:35)  HR: 70 (04 Nov 2020 05:05) (68 - 74)  BP: 149/41 (04 Nov 2020 05:05) (110/71 - 149/41)  BP(mean): --  RR: 16 (04 Nov 2020 05:05) (16 - 18)  SpO2: 100% (04 Nov 2020 05:05) (97% - 100%)    PHYSICAL EXAM  General: adult in NAD  HEENT: clear oropharynx, anicteric sclera, pink conjunctiva  Neck: supple  CV: normal S1/S2 with no murmur rubs or gallops  Lungs: positive air movement b/l ant lungs,clear to auscultation, no wheezes, no rales  Abdomen: soft non-tender non-distended, no hepatosplenomegaly  Ext: no clubbing cyanosis or edema  Skin: no rashes and no petechiae  Neuro: alert and oriented X 4, no focal deficits  LABS:                          9.4    8.63  )-----------( 343      ( 04 Nov 2020 07:16 )             29.5         Mean Cell Volume : 88.6 fl  Mean Cell Hemoglobin : 28.2 pg  Mean Cell Hemoglobin Concentration : 31.9 gm/dL  Auto Neutrophil # : x  Auto Lymphocyte # : x  Auto Monocyte # : x  Auto Eosinophil # : x  Auto Basophil # : x  Auto Neutrophil % : x  Auto Lymphocyte % : x  Auto Monocyte % : x  Auto Eosinophil % : x  Auto Basophil % : x    Serial CBC  Hematocrit 29.5  Hemoglobin 9.4  Plat 343  RBC 3.33  WBC 8.63  Serial CBC  Hematocrit 31.4  Hemoglobin 9.9  Plat 372  RBC 3.53  WBC 7.60  Serial CBC  Hematocrit 31.6  Hemoglobin 9.9  Plat 365  RBC 3.56  WBC 7.86  Serial CBC  Hematocrit 31.3  Hemoglobin 9.8  Plat 335  RBC 3.48  WBC 7.14    11-04    138  |  104  |  15  ----------------------------<  95  3.9   |  27  |  0.61    Ca    8.5      04 Nov 2020 07:16    TPro  6.6  /  Alb  2.2<L>  /  TBili  0.4  /  DBili  x   /  AST  117<H>  /  ALT  169<H>  /  AlkPhos  303<H>  11-03      PT/INR - ( 02 Nov 2020 12:30 )   PT: 15.1 sec;   INR: 1.28 ratio             Ferritin, Serum: 777 ng/mL (11-01 @ 12:11)  Vitamin B12, Serum: 460 pg/mL (11-01 @ 12:11)  Folate, Serum: 12.5 ng/mL (11-01 @ 12:11)  Iron - Total Binding Capacity.: 141 ug/dL (11-01 @ 05:58)  Reticulocyte Percent: 1.2 % (11-01 @ 05:58)  Folate, Serum: 15.8 ng/mL (10-31 @ 12:26)  Vitamin B12, Serum: 414 pg/mL (10-31 @ 12:26)            BLOOD SMEAR INTERPRETATION:       RADIOLOGY & ADDITIONAL STUDIES:

## 2020-11-05 PROBLEM — Z00.00 ENCOUNTER FOR PREVENTIVE HEALTH EXAMINATION: Status: ACTIVE | Noted: 2020-11-05

## 2020-11-05 LAB
CULTURE RESULTS: SIGNIFICANT CHANGE UP
CULTURE RESULTS: SIGNIFICANT CHANGE UP
SPECIMEN SOURCE: SIGNIFICANT CHANGE UP
SPECIMEN SOURCE: SIGNIFICANT CHANGE UP
SURGICAL PATHOLOGY STUDY: SIGNIFICANT CHANGE UP

## 2020-11-12 DIAGNOSIS — Z01.818 ENCOUNTER FOR OTHER PREPROCEDURAL EXAMINATION: ICD-10-CM

## 2020-11-13 ENCOUNTER — APPOINTMENT (OUTPATIENT)
Dept: DISASTER EMERGENCY | Facility: CLINIC | Age: 60
End: 2020-11-13

## 2020-11-14 LAB — SARS-COV-2 N GENE NPH QL NAA+PROBE: NOT DETECTED

## 2020-11-16 ENCOUNTER — RESULT REVIEW (OUTPATIENT)
Age: 60
End: 2020-11-16

## 2020-11-16 ENCOUNTER — OUTPATIENT (OUTPATIENT)
Dept: OUTPATIENT SERVICES | Facility: HOSPITAL | Age: 60
LOS: 1 days | End: 2020-11-16
Payer: MEDICAID

## 2020-11-16 ENCOUNTER — APPOINTMENT (OUTPATIENT)
Dept: INTERVENTIONAL RADIOLOGY/VASCULAR | Facility: HOSPITAL | Age: 60
End: 2020-11-16
Payer: MEDICAID

## 2020-11-16 DIAGNOSIS — C56.1 MALIGNANT NEOPLASM OF RIGHT OVARY: ICD-10-CM

## 2020-11-16 DIAGNOSIS — C56.2 MALIGNANT NEOPLASM OF LEFT OVARY: ICD-10-CM

## 2020-11-16 LAB
ALBUMIN FLD-MCNC: 2.3 G/DL — SIGNIFICANT CHANGE UP
B PERT IGG+IGM PNL SER: CLEAR — SIGNIFICANT CHANGE UP
COLOR FLD: YELLOW — SIGNIFICANT CHANGE UP
FLUID INTAKE SUBSTANCE CLASS: SIGNIFICANT CHANGE UP
FLUID SEGMENTED GRANULOCYTES: 2 % — SIGNIFICANT CHANGE UP
FOLATE+VIT B12 SERBLD-IMP: 2 % — SIGNIFICANT CHANGE UP
GLUCOSE FLD-MCNC: 120 MG/DL — SIGNIFICANT CHANGE UP
GRAM STN FLD: SIGNIFICANT CHANGE UP
LYMPHOCYTES # FLD: 71 % — SIGNIFICANT CHANGE UP
MESOTHL CELL # FLD: 5 % — SIGNIFICANT CHANGE UP
MONOS+MACROS # FLD: 20 % — SIGNIFICANT CHANGE UP
PROT FLD-MCNC: 4.2 G/DL — SIGNIFICANT CHANGE UP
RCV VOL RI: 380 /UL — HIGH (ref 0–5)
SPECIMEN SOURCE: SIGNIFICANT CHANGE UP
TOTAL NUCLEATED CELL COUNT, BODY FLUID: 470 /UL — SIGNIFICANT CHANGE UP
TUBE TYPE: SIGNIFICANT CHANGE UP

## 2020-11-16 PROCEDURE — 87070 CULTURE OTHR SPECIMN AEROBIC: CPT

## 2020-11-16 PROCEDURE — 88305 TISSUE EXAM BY PATHOLOGIST: CPT | Mod: 26

## 2020-11-16 PROCEDURE — 88112 CYTOPATH CELL ENHANCE TECH: CPT

## 2020-11-16 PROCEDURE — 89051 BODY FLUID CELL COUNT: CPT

## 2020-11-16 PROCEDURE — 82945 GLUCOSE OTHER FLUID: CPT

## 2020-11-16 PROCEDURE — 76942 ECHO GUIDE FOR BIOPSY: CPT

## 2020-11-16 PROCEDURE — C1729: CPT

## 2020-11-16 PROCEDURE — C1769: CPT

## 2020-11-16 PROCEDURE — 88305 TISSUE EXAM BY PATHOLOGIST: CPT

## 2020-11-16 PROCEDURE — 49083 ABD PARACENTESIS W/IMAGING: CPT

## 2020-11-16 PROCEDURE — 84157 ASSAY OF PROTEIN OTHER: CPT

## 2020-11-16 PROCEDURE — 82042 OTHER SOURCE ALBUMIN QUAN EA: CPT

## 2020-11-16 PROCEDURE — 88112 CYTOPATH CELL ENHANCE TECH: CPT | Mod: 26

## 2020-11-16 PROCEDURE — 87075 CULTR BACTERIA EXCEPT BLOOD: CPT

## 2020-11-16 PROCEDURE — 87205 SMEAR GRAM STAIN: CPT

## 2020-11-18 LAB — NON-GYNECOLOGICAL CYTOLOGY STUDY: SIGNIFICANT CHANGE UP

## 2020-11-21 LAB
CULTURE RESULTS: SIGNIFICANT CHANGE UP
SPECIMEN SOURCE: SIGNIFICANT CHANGE UP

## 2020-12-04 PROBLEM — Z86.39 HISTORY OF HYPERLIPIDEMIA: Status: RESOLVED | Noted: 2020-12-04 | Resolved: 2020-12-04

## 2020-12-04 PROBLEM — Z86.79 HISTORY OF HYPERTENSION: Status: RESOLVED | Noted: 2020-12-04 | Resolved: 2020-12-04

## 2020-12-04 PROBLEM — Z86.39 HISTORY OF HYPOTHYROIDISM: Status: RESOLVED | Noted: 2020-12-04 | Resolved: 2020-12-04

## 2020-12-07 ENCOUNTER — APPOINTMENT (OUTPATIENT)
Dept: GYNECOLOGIC ONCOLOGY | Facility: CLINIC | Age: 60
End: 2020-12-07
Payer: MEDICAID

## 2020-12-07 VITALS
HEIGHT: 61 IN | OXYGEN SATURATION: 98 % | WEIGHT: 144 LBS | TEMPERATURE: 97.9 F | BODY MASS INDEX: 27.19 KG/M2 | HEART RATE: 90 BPM | DIASTOLIC BLOOD PRESSURE: 66 MMHG | SYSTOLIC BLOOD PRESSURE: 118 MMHG

## 2020-12-07 DIAGNOSIS — Z86.39 PERSONAL HISTORY OF OTHER ENDOCRINE, NUTRITIONAL AND METABOLIC DISEASE: ICD-10-CM

## 2020-12-07 DIAGNOSIS — Z86.79 PERSONAL HISTORY OF OTHER DISEASES OF THE CIRCULATORY SYSTEM: ICD-10-CM

## 2020-12-07 DIAGNOSIS — Z78.9 OTHER SPECIFIED HEALTH STATUS: ICD-10-CM

## 2020-12-07 PROCEDURE — 99205 OFFICE O/P NEW HI 60 MIN: CPT

## 2020-12-07 PROCEDURE — 99072 ADDL SUPL MATRL&STAF TM PHE: CPT

## 2020-12-07 RX ORDER — ONDANSETRON 4 MG/1
4 TABLET, ORALLY DISINTEGRATING ORAL
Refills: 0 | Status: ACTIVE | COMMUNITY

## 2020-12-07 RX ORDER — ATORVASTATIN CALCIUM 20 MG/1
20 TABLET, FILM COATED ORAL
Refills: 0 | Status: ACTIVE | COMMUNITY

## 2020-12-07 RX ORDER — LEVOTHYROXINE SODIUM 0.17 MG/1
TABLET ORAL
Refills: 0 | Status: ACTIVE | COMMUNITY

## 2020-12-07 RX ORDER — MULTIVITAMIN
TABLET ORAL
Refills: 0 | Status: ACTIVE | COMMUNITY

## 2020-12-07 RX ORDER — TRAMADOL HYDROCHLORIDE 50 MG/1
50 TABLET, COATED ORAL
Refills: 0 | Status: ACTIVE | COMMUNITY

## 2020-12-09 ENCOUNTER — OUTPATIENT (OUTPATIENT)
Dept: OUTPATIENT SERVICES | Facility: HOSPITAL | Age: 60
LOS: 1 days | End: 2020-12-09

## 2020-12-09 DIAGNOSIS — C80.1 MALIGNANT (PRIMARY) NEOPLASM, UNSPECIFIED: ICD-10-CM

## 2020-12-10 ENCOUNTER — RESULT REVIEW (OUTPATIENT)
Age: 60
End: 2020-12-10

## 2020-12-18 DIAGNOSIS — A04.8 OTHER SPECIFIED BACTERIAL INTESTINAL INFECTIONS: ICD-10-CM

## 2020-12-18 LAB — SURGICAL PATHOLOGY STUDY: SIGNIFICANT CHANGE UP

## 2020-12-18 RX ORDER — OMEPRAZOLE 20 MG/1
20 CAPSULE, DELAYED RELEASE ORAL TWICE DAILY
Qty: 28 | Refills: 0 | Status: ACTIVE | COMMUNITY
Start: 2020-12-18 | End: 1900-01-01

## 2020-12-18 RX ORDER — METRONIDAZOLE 250 MG/1
250 TABLET ORAL EVERY 6 HOURS
Qty: 56 | Refills: 0 | Status: ACTIVE | COMMUNITY
Start: 2020-12-18 | End: 1900-01-01

## 2020-12-18 RX ORDER — BISMUTH SUBSALICYLATE 262 MG
262 TABLET,CHEWABLE ORAL 4 TIMES DAILY
Qty: 8 | Refills: 1 | Status: ACTIVE | COMMUNITY
Start: 2020-12-18 | End: 1900-01-01

## 2020-12-18 RX ORDER — AMOXICILLIN 500 MG/1
500 CAPSULE ORAL TWICE DAILY
Qty: 40 | Refills: 0 | Status: ACTIVE | COMMUNITY
Start: 2020-12-18 | End: 1900-01-01

## 2021-01-05 LAB
ALBUMIN SERPL ELPH-MCNC: 4.2 G/DL
ALP BLD-CCNC: 138 U/L
ALT SERPL-CCNC: 25 U/L
ANION GAP SERPL CALC-SCNC: 10 MMOL/L
AST SERPL-CCNC: 13 U/L
BILIRUB SERPL-MCNC: 0.3 MG/DL
BUN SERPL-MCNC: 18 MG/DL
CALCIUM SERPL-MCNC: 9.7 MG/DL
CANCER AG125 SERPL-ACNC: 344 U/ML
CHLORIDE SERPL-SCNC: 99 MMOL/L
CO2 SERPL-SCNC: 27 MMOL/L
CREAT SERPL-MCNC: 0.71 MG/DL
CYTOLOGY CVX/VAG DOC THIN PREP: NORMAL
GLUCOSE SERPL-MCNC: 88 MG/DL
HPV HIGH+LOW RISK DNA PNL CVX: NOT DETECTED
POTASSIUM SERPL-SCNC: 4.1 MMOL/L
PROT SERPL-MCNC: 7.6 G/DL
SODIUM SERPL-SCNC: 136 MMOL/L

## 2021-01-11 ENCOUNTER — APPOINTMENT (OUTPATIENT)
Dept: GYNECOLOGIC ONCOLOGY | Facility: CLINIC | Age: 61
End: 2021-01-11

## 2021-01-12 ENCOUNTER — APPOINTMENT (OUTPATIENT)
Dept: CT IMAGING | Facility: IMAGING CENTER | Age: 61
End: 2021-01-12

## 2021-01-14 ENCOUNTER — NON-APPOINTMENT (OUTPATIENT)
Age: 61
End: 2021-01-14

## 2021-01-14 ENCOUNTER — APPOINTMENT (OUTPATIENT)
Dept: MAMMOGRAPHY | Facility: HOSPITAL | Age: 61
End: 2021-01-14

## 2021-01-14 ENCOUNTER — APPOINTMENT (OUTPATIENT)
Dept: CT IMAGING | Facility: HOSPITAL | Age: 61
End: 2021-01-14
Payer: MEDICAID

## 2021-01-14 ENCOUNTER — RESULT REVIEW (OUTPATIENT)
Age: 61
End: 2021-01-14

## 2021-01-14 ENCOUNTER — OUTPATIENT (OUTPATIENT)
Dept: OUTPATIENT SERVICES | Facility: HOSPITAL | Age: 61
LOS: 1 days | End: 2021-01-14
Payer: MEDICAID

## 2021-01-14 ENCOUNTER — APPOINTMENT (OUTPATIENT)
Dept: CT IMAGING | Facility: HOSPITAL | Age: 61
End: 2021-01-14

## 2021-01-14 DIAGNOSIS — C56.9 MALIGNANT NEOPLASM OF UNSPECIFIED OVARY: ICD-10-CM

## 2021-01-14 DIAGNOSIS — C79.9 SECONDARY MALIGNANT NEOPLASM OF UNSPECIFIED SITE: ICD-10-CM

## 2021-01-14 PROCEDURE — 77067 SCR MAMMO BI INCL CAD: CPT

## 2021-01-14 PROCEDURE — 77063 BREAST TOMOSYNTHESIS BI: CPT

## 2021-01-14 PROCEDURE — 74177 CT ABD & PELVIS W/CONTRAST: CPT | Mod: 26

## 2021-01-14 PROCEDURE — 71260 CT THORAX DX C+: CPT

## 2021-01-14 PROCEDURE — 71260 CT THORAX DX C+: CPT | Mod: 26

## 2021-01-14 PROCEDURE — 77067 SCR MAMMO BI INCL CAD: CPT | Mod: 26

## 2021-01-14 PROCEDURE — 74177 CT ABD & PELVIS W/CONTRAST: CPT

## 2021-01-14 PROCEDURE — 77063 BREAST TOMOSYNTHESIS BI: CPT | Mod: 26

## 2021-01-20 ENCOUNTER — NON-APPOINTMENT (OUTPATIENT)
Age: 61
End: 2021-01-20

## 2021-01-20 ENCOUNTER — APPOINTMENT (OUTPATIENT)
Dept: GYNECOLOGIC ONCOLOGY | Facility: CLINIC | Age: 61
End: 2021-01-20
Payer: MEDICAID

## 2021-01-20 VITALS
WEIGHT: 140 LBS | DIASTOLIC BLOOD PRESSURE: 82 MMHG | BODY MASS INDEX: 26.43 KG/M2 | HEIGHT: 61 IN | HEART RATE: 93 BPM | SYSTOLIC BLOOD PRESSURE: 154 MMHG

## 2021-01-20 PROCEDURE — 99215 OFFICE O/P EST HI 40 MIN: CPT

## 2021-01-20 PROCEDURE — 99072 ADDL SUPL MATRL&STAF TM PHE: CPT

## 2021-01-21 ENCOUNTER — OUTPATIENT (OUTPATIENT)
Dept: OUTPATIENT SERVICES | Facility: HOSPITAL | Age: 61
LOS: 1 days | End: 2021-01-21
Payer: MEDICAID

## 2021-01-21 VITALS
HEIGHT: 61 IN | TEMPERATURE: 98 F | WEIGHT: 139.99 LBS | DIASTOLIC BLOOD PRESSURE: 80 MMHG | HEART RATE: 75 BPM | RESPIRATION RATE: 14 BRPM | SYSTOLIC BLOOD PRESSURE: 135 MMHG | OXYGEN SATURATION: 98 %

## 2021-01-21 DIAGNOSIS — C56.9 MALIGNANT NEOPLASM OF UNSPECIFIED OVARY: ICD-10-CM

## 2021-01-21 DIAGNOSIS — Z98.890 OTHER SPECIFIED POSTPROCEDURAL STATES: Chronic | ICD-10-CM

## 2021-01-21 LAB
ALBUMIN SERPL ELPH-MCNC: 4.6 G/DL — SIGNIFICANT CHANGE UP (ref 3.3–5)
ALP SERPL-CCNC: 96 U/L — SIGNIFICANT CHANGE UP (ref 40–120)
ALT FLD-CCNC: 15 U/L — SIGNIFICANT CHANGE UP (ref 4–33)
ANION GAP SERPL CALC-SCNC: 13 MMOL/L — SIGNIFICANT CHANGE UP (ref 7–14)
AST SERPL-CCNC: 15 U/L — SIGNIFICANT CHANGE UP (ref 4–32)
BILIRUB SERPL-MCNC: 0.3 MG/DL — SIGNIFICANT CHANGE UP (ref 0.2–1.2)
BLD GP AB SCN SERPL QL: NEGATIVE — SIGNIFICANT CHANGE UP
BUN SERPL-MCNC: 15 MG/DL — SIGNIFICANT CHANGE UP (ref 7–23)
CALCIUM SERPL-MCNC: 9.9 MG/DL — SIGNIFICANT CHANGE UP (ref 8.4–10.5)
CHLORIDE SERPL-SCNC: 105 MMOL/L — SIGNIFICANT CHANGE UP (ref 98–107)
CO2 SERPL-SCNC: 24 MMOL/L — SIGNIFICANT CHANGE UP (ref 22–31)
CREAT SERPL-MCNC: 0.55 MG/DL — SIGNIFICANT CHANGE UP (ref 0.5–1.3)
GLUCOSE SERPL-MCNC: 82 MG/DL — SIGNIFICANT CHANGE UP (ref 70–99)
HCT VFR BLD CALC: 37.5 % — SIGNIFICANT CHANGE UP (ref 34.5–45)
HGB BLD-MCNC: 11.7 G/DL — SIGNIFICANT CHANGE UP (ref 11.5–15.5)
MCHC RBC-ENTMCNC: 27.5 PG — SIGNIFICANT CHANGE UP (ref 27–34)
MCHC RBC-ENTMCNC: 31.2 GM/DL — LOW (ref 32–36)
MCV RBC AUTO: 88 FL — SIGNIFICANT CHANGE UP (ref 80–100)
NRBC # BLD: 0 /100 WBCS — SIGNIFICANT CHANGE UP
NRBC # FLD: 0 K/UL — SIGNIFICANT CHANGE UP
PLATELET # BLD AUTO: 220 K/UL — SIGNIFICANT CHANGE UP (ref 150–400)
POTASSIUM SERPL-MCNC: 4 MMOL/L — SIGNIFICANT CHANGE UP (ref 3.5–5.3)
POTASSIUM SERPL-SCNC: 4 MMOL/L — SIGNIFICANT CHANGE UP (ref 3.5–5.3)
PROT SERPL-MCNC: 7.8 G/DL — SIGNIFICANT CHANGE UP (ref 6–8.3)
RBC # BLD: 4.26 M/UL — SIGNIFICANT CHANGE UP (ref 3.8–5.2)
RBC # FLD: 17.2 % — HIGH (ref 10.3–14.5)
RH IG SCN BLD-IMP: POSITIVE — SIGNIFICANT CHANGE UP
SODIUM SERPL-SCNC: 142 MMOL/L — SIGNIFICANT CHANGE UP (ref 135–145)
WBC # BLD: 5.2 K/UL — SIGNIFICANT CHANGE UP (ref 3.8–10.5)
WBC # FLD AUTO: 5.2 K/UL — SIGNIFICANT CHANGE UP (ref 3.8–10.5)

## 2021-01-21 PROCEDURE — 93010 ELECTROCARDIOGRAM REPORT: CPT

## 2021-01-21 RX ORDER — ATORVASTATIN CALCIUM 80 MG/1
1 TABLET, FILM COATED ORAL
Qty: 0 | Refills: 0 | DISCHARGE

## 2021-01-21 NOTE — H&P PST ADULT - NSICDXPASTMEDICALHX_GEN_ALL_CORE_FT
PAST MEDICAL HISTORY:  HTN (hypertension)     Hyperlipidemia     Hypothyroidism     Malignant neoplasm left ovarian, s/p chemotherapy

## 2021-01-21 NOTE — H&P PST ADULT - ATTENDING COMMENTS
patient seen and evaluated, with daughter present. [facility] for discussion of consent. However, surgery was extensively discussed with patient prior to today using . Today, daughter interpretered the consent discussion. Plan for Exlap/flash/bso/debulking, possible bowel resection. Discussed risks including bleeding, infection, injury to bowel/bladder/vessels/nerves/ureters, risks of blood transfusion, reoperation, ICU admission  all questions answered

## 2021-01-21 NOTE — H&P PST ADULT - FUNCTIONAL LEVEL PRIOR: AMBULATION
Pt reports he had his prostate removed and has a donny kramer in place. The drain no longer holds its suction and there has been no drainage since 1530 today. He states that this began after he showered.  Pt denies any pain or urinary issues 0 = independent

## 2021-01-21 NOTE — H&P PST ADULT - RS GEN PE MLT RESP DETAILS PC
breath sounds equal/good air movement/respirations non-labored/clear to auscultation bilaterally/no chest wall tenderness/no rales/no rhonchi/no wheezes

## 2021-01-21 NOTE — H&P PST ADULT - NSANTHOSAYNRD_GEN_A_CORE
No. NOA screening performed.  STOP BANG Legend: 0-2 = LOW Risk; 3-4 = INTERMEDIATE Risk; 5-8 = HIGH Risk

## 2021-01-21 NOTE — H&P PST ADULT - HISTORY OF PRESENT ILLNESS
59y/o female scheduled for exploratory laparotomy, ALYSSA, BSO, debulking on 1/26/2021.  Pt states, "10/2020 developed abdominal pain and swelling.  Was seen in [facility],  with left ovarian cancer metastasis to liver and left lung nodule.  Underwent chemotherapy from 11/2020 to 12/23/2020.  In 11/2020 underwent removal of 7 liters from abdominal ascites.  Denies abdominal pain."

## 2021-01-21 NOTE — H&P PST ADULT - NEGATIVE GENERAL SYMPTOMS
no fever/no chills/no sweating/no anorexia/no weight gain/no polyphagia/no polyuria/no polydipsia/no malaise/no fatigue

## 2021-01-21 NOTE — H&P PST ADULT - ASSESSMENT
malignant neoplasm of ovary  malignant neoplasm of ovary       plan  61y/o female scheduled for exploratory laparotomy, ALYSSA, BSO, debulking on 1/26/2021.  labs done results pending, ekg done.  Pt scheduled for preop covid testing.  Hibiclens provided, written and verbal instructions given with teach back, pt able to verbalize understanding.  Preop teaching done, pt able to verbalize understanding.   meds day of surgery-  levothyroxine, pepcid, telmisartan  Pt scheduled for medical eval as per surgeons request, pst not requesting.

## 2021-01-23 ENCOUNTER — APPOINTMENT (OUTPATIENT)
Dept: DISASTER EMERGENCY | Facility: CLINIC | Age: 61
End: 2021-01-23

## 2021-01-25 ENCOUNTER — NON-APPOINTMENT (OUTPATIENT)
Age: 61
End: 2021-01-25

## 2021-01-25 ENCOUNTER — TRANSCRIPTION ENCOUNTER (OUTPATIENT)
Age: 61
End: 2021-01-25

## 2021-01-25 RX ORDER — NEOMYCIN SULFATE 500 MG/1
500 TABLET ORAL
Qty: 6 | Refills: 0 | Status: ACTIVE | COMMUNITY
Start: 1900-01-01 | End: 1900-01-01

## 2021-01-25 RX ORDER — METRONIDAZOLE 500 MG/1
500 TABLET ORAL
Qty: 3 | Refills: 0 | Status: ACTIVE | COMMUNITY
Start: 1900-01-01 | End: 1900-01-01

## 2021-01-25 NOTE — ASU PATIENT PROFILE, ADULT - PMH
HTN (hypertension)    Hyperlipidemia    Hypothyroidism    Malignant neoplasm  left ovarian, s/p chemotherapy

## 2021-01-26 ENCOUNTER — TRANSCRIPTION ENCOUNTER (OUTPATIENT)
Age: 61
End: 2021-01-26

## 2021-01-26 ENCOUNTER — INPATIENT (INPATIENT)
Facility: HOSPITAL | Age: 61
LOS: 2 days | Discharge: HOME | End: 2021-01-29
Attending: OBSTETRICS & GYNECOLOGY | Admitting: OBSTETRICS & GYNECOLOGY
Payer: MEDICAID

## 2021-01-26 ENCOUNTER — RESULT REVIEW (OUTPATIENT)
Age: 61
End: 2021-01-26

## 2021-01-26 ENCOUNTER — APPOINTMENT (OUTPATIENT)
Dept: GYNECOLOGIC ONCOLOGY | Facility: HOSPITAL | Age: 61
End: 2021-01-26

## 2021-01-26 VITALS
HEART RATE: 86 BPM | DIASTOLIC BLOOD PRESSURE: 80 MMHG | WEIGHT: 139.99 LBS | HEIGHT: 61 IN | TEMPERATURE: 98 F | OXYGEN SATURATION: 99 % | RESPIRATION RATE: 18 BRPM | SYSTOLIC BLOOD PRESSURE: 138 MMHG

## 2021-01-26 DIAGNOSIS — Z98.890 OTHER SPECIFIED POSTPROCEDURAL STATES: Chronic | ICD-10-CM

## 2021-01-26 DIAGNOSIS — C56.9 MALIGNANT NEOPLASM OF UNSPECIFIED OVARY: ICD-10-CM

## 2021-01-26 LAB
SARS-COV-2 N GENE NPH QL NAA+PROBE: NOT DETECTED
SARS-COV-2 RNA SPEC QL NAA+PROBE: SIGNIFICANT CHANGE UP

## 2021-01-26 PROCEDURE — 88304 TISSUE EXAM BY PATHOLOGIST: CPT | Mod: 26

## 2021-01-26 PROCEDURE — 88305 TISSUE EXAM BY PATHOLOGIST: CPT | Mod: 26

## 2021-01-26 PROCEDURE — 88360 TUMOR IMMUNOHISTOCHEM/MANUAL: CPT | Mod: 26

## 2021-01-26 PROCEDURE — 88309 TISSUE EXAM BY PATHOLOGIST: CPT | Mod: 26

## 2021-01-26 PROCEDURE — 88341 IMHCHEM/IMCYTCHM EA ADD ANTB: CPT | Mod: 26,59

## 2021-01-26 PROCEDURE — 88342 IMHCHEM/IMCYTCHM 1ST ANTB: CPT | Mod: 26,59

## 2021-01-26 PROCEDURE — 58953 TAH RAD DISSECT FOR DEBULK: CPT

## 2021-01-26 RX ORDER — HEPARIN SODIUM 5000 [USP'U]/ML
5000 INJECTION INTRAVENOUS; SUBCUTANEOUS EVERY 8 HOURS
Refills: 0 | Status: DISCONTINUED | OUTPATIENT
Start: 2021-01-26 | End: 2021-01-28

## 2021-01-26 RX ORDER — DIPHENHYDRAMINE HCL 50 MG
25 CAPSULE ORAL ONCE
Refills: 0 | Status: COMPLETED | OUTPATIENT
Start: 2021-01-26 | End: 2021-01-26

## 2021-01-26 RX ORDER — ACETAMINOPHEN 500 MG
1000 TABLET ORAL ONCE
Refills: 0 | Status: COMPLETED | OUTPATIENT
Start: 2021-01-26 | End: 2021-01-26

## 2021-01-26 RX ORDER — NALOXONE HYDROCHLORIDE 4 MG/.1ML
0.1 SPRAY NASAL
Refills: 0 | Status: DISCONTINUED | OUTPATIENT
Start: 2021-01-26 | End: 2021-01-28

## 2021-01-26 RX ORDER — SODIUM CHLORIDE 9 MG/ML
1000 INJECTION, SOLUTION INTRAVENOUS
Refills: 0 | Status: DISCONTINUED | OUTPATIENT
Start: 2021-01-26 | End: 2021-01-26

## 2021-01-26 RX ORDER — ONDANSETRON 8 MG/1
4 TABLET, FILM COATED ORAL EVERY 6 HOURS
Refills: 0 | Status: DISCONTINUED | OUTPATIENT
Start: 2021-01-26 | End: 2021-01-28

## 2021-01-26 RX ORDER — ACETAMINOPHEN 500 MG
1000 TABLET ORAL ONCE
Refills: 0 | Status: COMPLETED | OUTPATIENT
Start: 2021-01-27 | End: 2021-01-27

## 2021-01-26 RX ORDER — FENTANYL CITRATE 50 UG/ML
25 INJECTION INTRAVENOUS
Refills: 0 | Status: DISCONTINUED | OUTPATIENT
Start: 2021-01-26 | End: 2021-01-26

## 2021-01-26 RX ORDER — METOCLOPRAMIDE HCL 10 MG
10 TABLET ORAL ONCE
Refills: 0 | Status: COMPLETED | OUTPATIENT
Start: 2021-01-26 | End: 2021-01-26

## 2021-01-26 RX ORDER — LEVOTHYROXINE SODIUM 125 MCG
50 TABLET ORAL DAILY
Refills: 0 | Status: DISCONTINUED | OUTPATIENT
Start: 2021-01-26 | End: 2021-01-29

## 2021-01-26 RX ORDER — SODIUM CHLORIDE 9 MG/ML
1000 INJECTION, SOLUTION INTRAVENOUS
Refills: 0 | Status: DISCONTINUED | OUTPATIENT
Start: 2021-01-26 | End: 2021-01-27

## 2021-01-26 RX ADMIN — Medication 10 MILLIGRAM(S): at 18:48

## 2021-01-26 RX ADMIN — HEPARIN SODIUM 5000 UNIT(S): 5000 INJECTION INTRAVENOUS; SUBCUTANEOUS at 18:46

## 2021-01-26 RX ADMIN — Medication 400 MILLIGRAM(S): at 17:48

## 2021-01-26 RX ADMIN — FENTANYL CITRATE 25 MICROGRAM(S): 50 INJECTION INTRAVENOUS at 14:00

## 2021-01-26 RX ADMIN — Medication 400 MILLIGRAM(S): at 21:44

## 2021-01-26 RX ADMIN — Medication 25 MILLIGRAM(S): at 23:03

## 2021-01-26 RX ADMIN — ONDANSETRON 4 MILLIGRAM(S): 8 TABLET, FILM COATED ORAL at 13:59

## 2021-01-26 RX ADMIN — SODIUM CHLORIDE 125 MILLILITER(S): 9 INJECTION, SOLUTION INTRAVENOUS at 19:44

## 2021-01-26 NOTE — PROGRESS NOTE ADULT - SUBJECTIVE AND OBJECTIVE BOX
PA GYN/ONC POST OP NOTE:    Pt seen and examined in PACU  Vital Signs Last 24 Hrs  T(C): 36.6 (26 Jan 2021 13:15), Max: 36.6 (26 Jan 2021 13:15)  T(F): 97.9 (26 Jan 2021 13:15), Max: 97.9 (26 Jan 2021 13:15)  HR: 77 (26 Jan 2021 14:30) (77 - 86)  BP: 131/63 (26 Jan 2021 14:30) (105/65 - 138/80)  BP(mean): 81 (26 Jan 2021 14:30) (70 - 85)  RR: 16 (26 Jan 2021 14:30) (10 - 20)  SpO2: 100% (26 Jan 2021 14:30) (99% - 100%)    U/O:    I&O's Detail    26 Jan 2021 07:01  -  26 Jan 2021 15:15  --------------------------------------------------------  IN:    Lactated Ringers: 250 mL  Total IN: 250 mL    OUT:    Indwelling Catheter - Urethral (mL): 220 mL  Total OUT: 220 mL    Total NET: 30 mL          PHYSICAL EXAM:  CHEST/LUNG:  HEART:   ABDOMEN:   INCISION:   EXTREMITIES:      LABS:                MEDICATIONS  (STANDING):  acetaminophen  IVPB .. 1000 milliGRAM(s) IV Intermittent once  acetaminophen  IVPB .. 1000 milliGRAM(s) IV Intermittent once  heparin   Injectable 5000 Unit(s) SubCutaneous every 8 hours  hydromorphone (10 MICROgram(s)/mL) + bupivacaine 0.0625% in 0.9% Sodium Chloride PCEA 250 milliLiter(s) Epidural PCA Continuous  lactated ringers. 1000 milliLiter(s) (125 mL/Hr) IV Continuous <Continuous>  levothyroxine 50 MICROGram(s) Oral daily    MEDICATIONS  (PRN):  fentaNYL    Injectable 25 MICROGram(s) IV Push every 5 minutes PRN Moderate Pain (4 - 6)  metoclopramide Injectable 10 milliGRAM(s) IV Push once PRN Nausea and/or Vomiting  naloxone Injectable 0.1 milliGRAM(s) IV Push every 3 minutes PRN For ANY of the following changes in patient status:  A. RR LESS THAN 10 breaths per minute, B. Oxygen saturation LESS THAN 90%, C. Sedation score of 6  ondansetron Injectable 4 milliGRAM(s) IV Push every 6 hours PRN Nausea       PA GYN/ONC POST OP NOTE:    Pt seen and examined in PACU, was awake and resting more comfortably from earlier as per her nurse. She received Zofran and Fentanyl and now has her PCEA all set up and is feeling more comfortable. She denies having chest pain, SOB, palpitations, vomiting, headache, dizziness.      Vital Signs Last 24 Hrs  T(C): 36.6 (26 Jan 2021 13:15), Max: 36.6 (26 Jan 2021 13:15)  T(F): 97.9 (26 Jan 2021 13:15), Max: 97.9 (26 Jan 2021 13:15)  HR: 77 (26 Jan 2021 14:30) (77 - 86)  BP: 131/63 (26 Jan 2021 14:30) (105/65 - 138/80)  BP(mean): 81 (26 Jan 2021 14:30) (70 - 85)  RR: 16 (26 Jan 2021 14:30) (10 - 20)  SpO2: 100% (26 Jan 2021 14:30) (99% - 100%)    U/O:    I&O's Detail    26 Jan 2021 07:01  -  26 Jan 2021 15:15  --------------------------------------------------------  IN:    Lactated Ringers: 250 mL  Total IN: 250 mL    OUT:    Indwelling Catheter - Urethral (mL): 220 mL  Total OUT: 220 mL    Total NET: 30 mL    PHYSICAL EXAM:  CHEST/LUNG: CTA B/L  HEART: S1S2 RRR  ABDOMEN: Soft, appropriately tender  INCISION: C/D/I with an abdominal binder in place over incision  EXTREMITIES: NT B/L, Pt has Venodynes on for DVT ppx     MEDICATIONS  (STANDING):  acetaminophen  IVPB .. 1000 milliGRAM(s) IV Intermittent once  acetaminophen  IVPB .. 1000 milliGRAM(s) IV Intermittent once  heparin   Injectable 5000 Unit(s) SubCutaneous every 8 hours  hydromorphone (10 MICROgram(s)/mL) + bupivacaine 0.0625% in 0.9% Sodium Chloride PCEA 250 milliLiter(s) Epidural PCA Continuous  lactated ringers. 1000 milliLiter(s) (125 mL/Hr) IV Continuous <Continuous>  levothyroxine 50 MICROGram(s) Oral daily    MEDICATIONS  (PRN):  fentaNYL    Injectable 25 MICROGram(s) IV Push every 5 minutes PRN Moderate Pain (4 - 6)  metoclopramide Injectable 10 milliGRAM(s) IV Push once PRN Nausea and/or Vomiting  naloxone Injectable 0.1 milliGRAM(s) IV Push every 3 minutes PRN For ANY of the following changes in patient status:  A. RR LESS THAN 10 breaths per minute, B. Oxygen saturation LESS THAN 90%, C. Sedation score of 6  ondansetron Injectable 4 milliGRAM(s) IV Push every 6 hours PRN Nausea

## 2021-01-26 NOTE — DISCHARGE NOTE PROVIDER - NSDCCPTREATMENT_GEN_ALL_CORE_FT
PRINCIPAL PROCEDURE  Procedure: Total abdominal hysterectomy with bilateral salpingo-oophorectomy (ALYSSA-BSO) and debulking of neoplasm  Findings and Treatment:       SECONDARY PROCEDURE  Procedure: Open cholecystectomy  Findings and Treatment:     Procedure: Omentectomy, open  Findings and Treatment:     Procedure: Open appendectomy  Findings and Treatment:

## 2021-01-26 NOTE — BRIEF OPERATIVE NOTE - NSICDXBRIEFPROCEDURE_GEN_ALL_CORE_FT
PROCEDURES:  Open appendectomy 26-Jan-2021 13:48:47  Bety Menjivar  Open cholecystectomy 26-Jan-2021 13:48:30  Bety Menjivar  Omentectomy, open 26-Jan-2021 13:48:19  Bety Menjivar  Total abdominal hysterectomy with bilateral salpingo-oophorectomy (ALYSSA-BSO) and debulking of neoplasm 26-Jan-2021 13:47:21  Bety Menjivar

## 2021-01-26 NOTE — DISCHARGE NOTE PROVIDER - HOSPITAL COURSE
Patient underwent an ex-lap, ALYSSA, BSO, omentectomy, cholecystectomy, appendectomy, debulking for metastatic carcinomatosis. EBL: 400. POD#0 Pain was well controlled with PCA pump and patient tolerated clears. POD#1 No acute events overnight. Patient was advanced to a regular diet. Mason was discontinued and patient voided spontaneously. Patient was transitioned to oral analgesics and pain was well controlled. Upon discharge on POD# , the patient is ambulating, voiding spontaneously, tolerating oral intake, pain was well controlled with oral medication, and vital signs were stable. Patient to have close follow up with _. Patient underwent an ex-lap, ALYSSA, BSO, omentectomy, cholecystectomy, appendectomy, optimal interval debulking for metastatic carcinomatosis. EBL: 400. Please see operative note for details. POD#0 Pain was well controlled with PCEA pump and patient tolerated clears. POD#1 No acute events overnight. Patient was advanced to a regular diet. Mason was discontinued and patient voided spontaneously. On POD#2 Patient was transitioned to oral analgesics and pain was well controlled. Upon discharge on POD#2, the patient is ambulating, voiding spontaneously, tolerating oral intake, pain was well controlled with oral medication, and vital signs were stable. Patient to have close follow up with Dr. Concepcion.               9.4    11.80 )-----------( 184      ( 01-27 @ 06:22 )             29.7       01-27    137  |  104  |  13  ----------------------------<  109<H>  4.4   |  23  |  0.64    Ca    9.2      27 Jan 2021 06:22  Phos  3.4     01-27  Mg     1.9     01-27      ( 01-27 @ 06:22 )   PT: 13.4 sec;   INR: 1.17 ratio  aPTT: 27.5 sec         Patient underwent an ex-lap, ALYSSA, BSO, omentectomy, cholecystectomy, appendectomy, optimal interval debulking for metastatic carcinomatosis. EBL: 400. Please see operative note for details. POD#0 Pain was well controlled with PCEA pump and patient tolerated clears. POD#1 No acute events overnight. Patient was advanced to a regular diet. Mason was discontinued and patient voided spontaneously. On POD#2 Patient was transitioned to oral analgesics and pain was well controlled. Upon discharge on POD#3, the patient is ambulating, voiding spontaneously, tolerating oral intake, pain was well controlled with oral medication, and vital signs were stable. Patient to have close follow up with Dr. Concepcion.               9.4    11.80 )-----------( 184      ( 01-27 @ 06:22 )             29.7       01-27    137  |  104  |  13  ----------------------------<  109<H>  4.4   |  23  |  0.64    Ca    9.2      27 Jan 2021 06:22  Phos  3.4     01-27  Mg     1.9     01-27      ( 01-27 @ 06:22 )   PT: 13.4 sec;   INR: 1.17 ratio  aPTT: 27.5 sec

## 2021-01-26 NOTE — DISCHARGE NOTE PROVIDER - NSDCFUADDINST_GEN_ALL_CORE_FT
Regular diet. Resume normal activity as tolerated. No heavy lifting, driving, or strenuous activity for 6 weeks. Call your doctor with any signs and symptoms of infection such as fever, chills, nausea or vomiting. Call your doctor with redness or swelling at the incision site, fluid leakage or wound separation. Call your doctor if you're unable to tolerate food or have difficulty urinating. Call your doctor if you have pain that is not relieved by your prescribed medications. Notify your doctor with any other concerns. Follow up with Dr. Concepcion on 2/8 at 4pm.

## 2021-01-26 NOTE — DISCHARGE NOTE PROVIDER - CARE PROVIDER_API CALL
Marilou Concepcion)  Gynecologic Oncology; Obstetrics and Gynecology  [address] [place] [address]  Phone: [phone]  Fax: [phone]  Follow Up Time:

## 2021-01-26 NOTE — BRIEF OPERATIVE NOTE - OPERATION/FINDINGS
EUA: Approx 4 week size mobile anteverted uteurus, normal cervix  Ex-lap: Omentum with nodular areas and areas of neoplasm noted. Bowel adherent to L side wall. Bowel adherent to omentum EUA: Approx 4 week size mobile anteverted uterus, normal cervix  Ex-lap: Omentum with nodular areas and areas of neoplasm noted. Bowel adherent to L side wall and L adnexa. Bowel adherent to omentum. Gallbladder with stones, nodular white area noted at the distal end. The gallbladder was removed by Dr. Anna. Appendix appeared thickened and was removed. Liver with no significant lesions. Peritoneum with minimal implants. No ascites noted. Grossly normal uterus, bl ovaries and R tube.  3 malignant lesions <1cm noted on mesentery and treated with Argon laser EUA: Approx 4 week size mobile anteverted uterus, normal cervix  Ex-lap: Omentum with nodular areas and areas of neoplasm noted. Bowel adherent to L side wall and L adnexa. Bowel adherent to omentum. Gallbladder with stones, nodular white area noted at the distal end. The gallbladder was removed by Dr. Anna. Appendix appeared thickened and was removed. Liver with no significant lesions. Peritoneum with minimal implants. No ascites noted. Grossly normal uterus, bl ovaries and R tube.  3 malignant lesions <1cm noted on mesentery and treated with Argon laser.  Normal uterus, tubes, and ovaries.  Optimal cytoreduction with < 5 mm residual disease.

## 2021-01-26 NOTE — DISCHARGE NOTE PROVIDER - NSDCMRMEDTOKEN_GEN_ALL_CORE_FT
atorvastatin 10 mg oral tablet: 1 tab(s) orally once a day (at bedtime)  levothyroxine 50 mcg (0.05 mg) oral tablet: 1 tab(s) orally once a day  multivitamin: 1 tab(s) orally once a day  telmisartan 20 mg oral tablet: 1 tab(s) orally once a day   acetaminophen 325 mg oral tablet: 3 tab(s) orally every 6 hours  apixaban 2.5 mg oral tablet: 1 tab(s) orally 2 times a day   apixaban 2.5 mg oral tablet: 1 tab(s) orally 2 times a day   atorvastatin 10 mg oral tablet: 1 tab(s) orally once a day (at bedtime)  levothyroxine 50 mcg (0.05 mg) oral tablet: 1 tab(s) orally once a day  multivitamin: 1 tab(s) orally once a day  oxyCODONE 5 mg oral tablet: 1 tab(s) orally every 6 hours, As Needed -for severe pain MDD:4 pills   telmisartan 20 mg oral tablet: 1 tab(s) orally once a day

## 2021-01-27 DIAGNOSIS — Z98.890 OTHER SPECIFIED POSTPROCEDURAL STATES: ICD-10-CM

## 2021-01-27 DIAGNOSIS — C80.0 DISSEMINATED MALIGNANT NEOPLASM, UNSPECIFIED: ICD-10-CM

## 2021-01-27 LAB
ANION GAP SERPL CALC-SCNC: 10 MMOL/L — SIGNIFICANT CHANGE UP (ref 7–14)
APTT BLD: 27.5 SEC — SIGNIFICANT CHANGE UP (ref 27–36.3)
BASOPHILS # BLD AUTO: 0.01 K/UL — SIGNIFICANT CHANGE UP (ref 0–0.2)
BASOPHILS NFR BLD AUTO: 0.1 % — SIGNIFICANT CHANGE UP (ref 0–2)
BUN SERPL-MCNC: 13 MG/DL — SIGNIFICANT CHANGE UP (ref 7–23)
CALCIUM SERPL-MCNC: 9.2 MG/DL — SIGNIFICANT CHANGE UP (ref 8.4–10.5)
CHLORIDE SERPL-SCNC: 104 MMOL/L — SIGNIFICANT CHANGE UP (ref 98–107)
CO2 SERPL-SCNC: 23 MMOL/L — SIGNIFICANT CHANGE UP (ref 22–31)
CREAT SERPL-MCNC: 0.64 MG/DL — SIGNIFICANT CHANGE UP (ref 0.5–1.3)
EOSINOPHIL # BLD AUTO: 0 K/UL — SIGNIFICANT CHANGE UP (ref 0–0.5)
EOSINOPHIL NFR BLD AUTO: 0 % — SIGNIFICANT CHANGE UP (ref 0–6)
FIBRINOGEN PPP-MCNC: 516 MG/DL — SIGNIFICANT CHANGE UP (ref 290–520)
GLUCOSE SERPL-MCNC: 109 MG/DL — HIGH (ref 70–99)
HCT VFR BLD CALC: 29.7 % — LOW (ref 34.5–45)
HGB BLD-MCNC: 9.4 G/DL — LOW (ref 11.5–15.5)
IANC: 8.68 K/UL — HIGH (ref 1.5–8.5)
IMM GRANULOCYTES NFR BLD AUTO: 0.4 % — SIGNIFICANT CHANGE UP (ref 0–1.5)
INR BLD: 1.17 RATIO — HIGH (ref 0.88–1.16)
LYMPHOCYTES # BLD AUTO: 1.98 K/UL — SIGNIFICANT CHANGE UP (ref 1–3.3)
LYMPHOCYTES # BLD AUTO: 16.8 % — SIGNIFICANT CHANGE UP (ref 13–44)
MAGNESIUM SERPL-MCNC: 1.9 MG/DL — SIGNIFICANT CHANGE UP (ref 1.6–2.6)
MCHC RBC-ENTMCNC: 28.2 PG — SIGNIFICANT CHANGE UP (ref 27–34)
MCHC RBC-ENTMCNC: 31.6 GM/DL — LOW (ref 32–36)
MCV RBC AUTO: 89.2 FL — SIGNIFICANT CHANGE UP (ref 80–100)
MONOCYTES # BLD AUTO: 1.08 K/UL — HIGH (ref 0–0.9)
MONOCYTES NFR BLD AUTO: 9.2 % — SIGNIFICANT CHANGE UP (ref 2–14)
NEUTROPHILS # BLD AUTO: 8.68 K/UL — HIGH (ref 1.8–7.4)
NEUTROPHILS NFR BLD AUTO: 73.5 % — SIGNIFICANT CHANGE UP (ref 43–77)
NRBC # BLD: 0 /100 WBCS — SIGNIFICANT CHANGE UP
NRBC # FLD: 0 K/UL — SIGNIFICANT CHANGE UP
PHOSPHATE SERPL-MCNC: 3.4 MG/DL — SIGNIFICANT CHANGE UP (ref 2.5–4.5)
PLATELET # BLD AUTO: 184 K/UL — SIGNIFICANT CHANGE UP (ref 150–400)
POTASSIUM SERPL-MCNC: 4.4 MMOL/L — SIGNIFICANT CHANGE UP (ref 3.5–5.3)
POTASSIUM SERPL-SCNC: 4.4 MMOL/L — SIGNIFICANT CHANGE UP (ref 3.5–5.3)
PROTHROM AB SERPL-ACNC: 13.4 SEC — SIGNIFICANT CHANGE UP (ref 10.6–13.6)
RBC # BLD: 3.33 M/UL — LOW (ref 3.8–5.2)
RBC # FLD: 17.9 % — HIGH (ref 10.3–14.5)
SODIUM SERPL-SCNC: 137 MMOL/L — SIGNIFICANT CHANGE UP (ref 135–145)
WBC # BLD: 11.8 K/UL — HIGH (ref 3.8–10.5)
WBC # FLD AUTO: 11.8 K/UL — HIGH (ref 3.8–10.5)

## 2021-01-27 RX ORDER — ACETAMINOPHEN 500 MG
975 TABLET ORAL EVERY 6 HOURS
Refills: 0 | Status: DISCONTINUED | OUTPATIENT
Start: 2021-01-27 | End: 2021-01-29

## 2021-01-27 RX ORDER — SODIUM CHLORIDE 9 MG/ML
3 INJECTION INTRAMUSCULAR; INTRAVENOUS; SUBCUTANEOUS EVERY 8 HOURS
Refills: 0 | Status: DISCONTINUED | OUTPATIENT
Start: 2021-01-27 | End: 2021-01-29

## 2021-01-27 RX ORDER — MAGNESIUM OXIDE 400 MG ORAL TABLET 241.3 MG
400 TABLET ORAL ONCE
Refills: 0 | Status: COMPLETED | OUTPATIENT
Start: 2021-01-27 | End: 2021-01-27

## 2021-01-27 RX ORDER — APIXABAN 2.5 MG/1
1 TABLET, FILM COATED ORAL
Qty: 56 | Refills: 0
Start: 2021-01-27

## 2021-01-27 RX ADMIN — Medication 975 MILLIGRAM(S): at 23:10

## 2021-01-27 RX ADMIN — HEPARIN SODIUM 5000 UNIT(S): 5000 INJECTION INTRAVENOUS; SUBCUTANEOUS at 02:42

## 2021-01-27 RX ADMIN — MAGNESIUM OXIDE 400 MG ORAL TABLET 400 MILLIGRAM(S): 241.3 TABLET ORAL at 10:18

## 2021-01-27 RX ADMIN — HEPARIN SODIUM 5000 UNIT(S): 5000 INJECTION INTRAVENOUS; SUBCUTANEOUS at 10:18

## 2021-01-27 RX ADMIN — HEPARIN SODIUM 5000 UNIT(S): 5000 INJECTION INTRAVENOUS; SUBCUTANEOUS at 18:00

## 2021-01-27 RX ADMIN — SODIUM CHLORIDE 3 MILLILITER(S): 9 INJECTION INTRAMUSCULAR; INTRAVENOUS; SUBCUTANEOUS at 22:10

## 2021-01-27 RX ADMIN — Medication 50 MICROGRAM(S): at 05:03

## 2021-01-27 RX ADMIN — Medication 975 MILLIGRAM(S): at 17:57

## 2021-01-27 RX ADMIN — SODIUM CHLORIDE 125 MILLILITER(S): 9 INJECTION, SOLUTION INTRAVENOUS at 05:04

## 2021-01-27 RX ADMIN — Medication 400 MILLIGRAM(S): at 02:59

## 2021-01-27 RX ADMIN — Medication 400 MILLIGRAM(S): at 10:17

## 2021-01-27 NOTE — PROGRESS NOTE ADULT - SUBJECTIVE AND OBJECTIVE BOX
Anesthesia Pain Management Service: Day _2_ of Epidural    SUBJECTIVE: Patient doing well with PCEA and no problems.  Pain Scale Score:   Refer to charted pain scores    THERAPY:  [x ] Epidural Bupivacaine 0.0625% and Hydromorphone  		[ X] 10 micrograms/mL	[ ] 5 micrograms/mL  [ ] Epidural Bupivacaine 0.0625% and Fentanyl - 2 micrograms/mL  [ ] Epidural Ropivacaine 0.1% plain – 1 mg/mL  [ ] Patient Controlled Regional Anesthesia (PCRA) Ropivacaine  		[ ] 0.2%			[ ] 0.1%    Demand dose __3_ lockout __15_ (minutes) Continuous Rate _6__ Total: __92.1__ ml used (in past 24 hours)      MEDICATIONS  (STANDING):  heparin   Injectable 5000 Unit(s) SubCutaneous every 8 hours  hydromorphone (10 MICROgram(s)/mL) + bupivacaine 0.0625% in 0.9% Sodium Chloride PCEA 250 milliLiter(s) Epidural PCA Continuous  lactated ringers. 1000 milliLiter(s) (125 mL/Hr) IV Continuous <Continuous>  levothyroxine 50 MICROGram(s) Oral daily    MEDICATIONS  (PRN):  hydromorphone (10 MICROgram(s)/mL) + bupivacaine 0.0625% in 0.9% Sodium Chloride PCEA Rescue Clinician  Bolus 4 milliLiter(s) Epidural every 15 minutes PRN for Pain Score greater than 6  naloxone Injectable 0.1 milliGRAM(s) IV Push every 3 minutes PRN For ANY of the following changes in patient status:  A. RR LESS THAN 10 breaths per minute, B. Oxygen saturation LESS THAN 90%, C. Sedation score of 6  ondansetron Injectable 4 milliGRAM(s) IV Push every 6 hours PRN Nausea      OBJECTIVE: laying in bed     Assessment of Catheter Site:	[ ] Left	[ ] Right  [x ] Epidural 	[ ] Femoral	      [ ] Saphenous   [ ] Supraclavicular   [ ] Other:    [x ] Dressing intact	[x ] Site non-tender	[ x] Site without erythema, discharge, edema  [x ] Epidural tubing and connection checked	[x] Gross neurological exam within normal limits  [ ] Catheter removed – tip intact		[ ] Afebrile  	[ ] Febrile: ___   [ X] see Temp under VS below)    PT/INR - ( 27 Jan 2021 06:22 )   PT: 13.4 sec;   INR: 1.17 ratio         PTT - ( 27 Jan 2021 06:22 )  PTT:27.5 sec                      9.4    11.80 )-----------( 184      ( 27 Jan 2021 06:22 )             29.7     Vital Signs Last 24 Hrs  T(C): 36.6 (01-27-21 @ 10:00), Max: 37.6 (01-26-21 @ 19:00)  T(F): 97.8 (01-27-21 @ 10:00), Max: 99.6 (01-26-21 @ 19:00)  HR: 87 (01-27-21 @ 10:00) (77 - 98)  BP: 103/55 (01-27-21 @ 10:00) (96/58 - 133/71)  BP(mean): 80 (01-26-21 @ 18:00) (70 - 85)  RR: 18 (01-27-21 @ 10:00) (10 - 20)  SpO2: 99% (01-27-21 @ 10:00) (95% - 100%)      Sedation Score:	[x ] Alert	[ ] Drowsy	[ ] Arousable	[ ] Asleep	[ ] Unresponsive    Side Effects:	[x ] None	[ ] Nausea	[ ] Vomiting	[ ] Pruritus  		[ ] Weakness		[ ] Numbness	[ ] Other:    ASSESSMENT/ PLAN:    Therapy to  be:	[x ] Continue   [ ] Discontinued   [ ] Change to prn Analgesics    Documentation and Verification of current medications:  [ X ] Done	[ ] Not done, not eligible, reason:    Comments: Doing OK with epidural and may continue.    Progress Note written now but Patient was seen earlier.

## 2021-01-27 NOTE — PROGRESS NOTE ADULT - PROBLEM SELECTOR PLAN 1
Neuro: C/w PCEA for pain control. Continue with IV Tylenol.   CV: Hemodynamically stable. Hx of HTN. BP low-normal here likely due to epidural, will hold off on antihypertensives at this time  Pulm: Saturating well on RA. Increase incentive spirometry.  GI: Advance diet as tolerated today  : Mason in place. Adequate UOP. D/c after AM BMP  Heme: Continue HSQ/Venodynes for DVT ppx. Increase OOB.  Will need coags this AM as epidural is in place.  ID: Afebrile.  Endo: continue with Levothyroxine for hx of hypothyroidism  Dispo: continue inpatient care    BERRY Menjivar PGY-3  Patient seen with Gyn Oncology team  [id number] Neuro: C/w PCEA for pain control. Continue with IV Tylenol.   CV: Hemodynamically stable. Hx of HTN. BP low-normal here likely due to epidural, will hold off on antihypertensives at this time  Pulm: Saturating well on RA. Increase incentive spirometry.  GI: Advance diet as tolerated today  : Mason in place. Adequate UOP. D/c after AM labs  Heme: Continue HSQ/Venodynes for DVT ppx. Increase OOB.  Will need coags this AM as epidural is in place.  ID: Afebrile.  Endo: continue with Levothyroxine for hx of hypothyroidism  Dispo: continue inpatient care    BERRY Menjivar PGY-3  Patient seen with Gyn Oncology team  [phone]

## 2021-01-27 NOTE — CHART NOTE - NSCHARTNOTEFT_GEN_A_CORE
Home Apixaban Note     [facility] called. Apixaban is ready for  and the Copay is $0.   ( Pt home pharmacy does not take her insurance anymore so script was sent to [facility].)  Pt and  notified of above.

## 2021-01-27 NOTE — PROGRESS NOTE ADULT - SUBJECTIVE AND OBJECTIVE BOX
ANESTHESIA POSTOP CHECK    Patient seen and examined at bedside.     Vital Signs Last 24 Hrs  T(C): 36.6 (27 Jan 2021 10:00), Max: 37.6 (26 Jan 2021 19:00)  T(F): 97.8 (27 Jan 2021 10:00), Max: 99.6 (26 Jan 2021 19:00)  HR: 87 (27 Jan 2021 10:00) (77 - 98)  BP: 103/55 (27 Jan 2021 10:00) (96/58 - 133/71)  BP(mean): 80 (26 Jan 2021 18:00) (70 - 85)  RR: 18 (27 Jan 2021 10:00) (10 - 20)  SpO2: 99% (27 Jan 2021 10:00) (95% - 100%)  I&O's Summary    26 Jan 2021 07:01  -  27 Jan 2021 07:00  --------------------------------------------------------  IN: 2855 mL / OUT: 1245 mL / NET: 1610 mL    27 Jan 2021 07:01  -  27 Jan 2021 10:38  --------------------------------------------------------  IN: 0 mL / OUT: 75 mL / NET: -75 mL        No apparent anesthetic complications at this time.  All questions were answered.      Flori Wolfe D.O.  CA-3 Chief Resident  Department of Anesthesia  Pager [phone]

## 2021-01-27 NOTE — PROGRESS NOTE ADULT - SUBJECTIVE AND OBJECTIVE BOX
R2 Gyn ONC Progress Note POD#1  HD#2    Subjective:   Pt seen and examined at bedside. No events overnight. Pain well controlled. Tolerating clears. Pt denies fever, chest pain, SOB, nausea, vomiting, dizziness.      Objective:  T(F): 98.8 (01-27-21 @ 05:30), Max: 99.6 (01-26-21 @ 19:00)  HR: 85 (01-27-21 @ 05:30) (77 - 98)  BP: 106/60 (01-27-21 @ 05:30) (96/58 - 138/80)  RR: 16 (01-27-21 @ 05:30) (10 - 20)  SpO2: 100% (01-27-21 @ 05:30) (95% - 100%)  I&O's Summary    26 Jan 2021 07:01  -  27 Jan 2021 05:42  --------------------------------------------------------  IN: 2855 mL / OUT: 1245 mL / NET: 1610 mL      MEDICATIONS  (STANDING):  acetaminophen  IVPB .. 1000 milliGRAM(s) IV Intermittent once  heparin   Injectable 5000 Unit(s) SubCutaneous every 8 hours  hydromorphone (10 MICROgram(s)/mL) + bupivacaine 0.0625% in 0.9% Sodium Chloride PCEA 250 milliLiter(s) Epidural PCA Continuous  lactated ringers. 1000 milliLiter(s) (125 mL/Hr) IV Continuous <Continuous>  levothyroxine 50 MICROGram(s) Oral daily    MEDICATIONS  (PRN):  hydromorphone (10 MICROgram(s)/mL) + bupivacaine 0.0625% in 0.9% Sodium Chloride PCEA Rescue Clinician  Bolus 4 milliLiter(s) Epidural every 15 minutes PRN for Pain Score greater than 6  naloxone Injectable 0.1 milliGRAM(s) IV Push every 3 minutes PRN For ANY of the following changes in patient status:  A. RR LESS THAN 10 breaths per minute, B. Oxygen saturation LESS THAN 90%, C. Sedation score of 6  ondansetron Injectable 4 milliGRAM(s) IV Push every 6 hours PRN Nausea      Physical Exam:  Constitutional: NAD, A+O x3  CV: RR S1S2 no m/r/g  Lungs: CTA b/l, good air flow b/l  Abdomen: soft, softly-distended, appropriately-tender. No guarding, no rebound, hypoactive bowel sounds; abdominal binder in place  Incision: vertical midline incision above umbilicus clean, dry, intact with Perineo in place  Extremities: no lower extremity edema or calf tenderness bilaterally; venodynes in place   R2 Gyn ONC Progress Note POD#1  HD#2    Subjective:   Pt seen and examined at bedside. No events overnight. Pain well controlled. Tolerating clears. Pt denies fever, chest pain, SOB, nausea, vomiting, dizziness.      Objective:  T(F): 98.8 (01-27-21 @ 05:30), Max: 99.6 (01-26-21 @ 19:00)  HR: 85 (01-27-21 @ 05:30) (77 - 98)  BP: 106/60 (01-27-21 @ 05:30) (96/58 - 138/80)  RR: 16 (01-27-21 @ 05:30) (10 - 20)  SpO2: 100% (01-27-21 @ 05:30) (95% - 100%)  I&O's Summary    26 Jan 2021 07:01  -  27 Jan 2021 05:42  --------------------------------------------------------  IN: 2855 mL / OUT: 1245 mL / NET: 1610 mL      MEDICATIONS  (STANDING):  acetaminophen  IVPB .. 1000 milliGRAM(s) IV Intermittent once  heparin   Injectable 5000 Unit(s) SubCutaneous every 8 hours  hydromorphone (10 MICROgram(s)/mL) + bupivacaine 0.0625% in 0.9% Sodium Chloride PCEA 250 milliLiter(s) Epidural PCA Continuous  lactated ringers. 1000 milliLiter(s) (125 mL/Hr) IV Continuous <Continuous>  levothyroxine 50 MICROGram(s) Oral daily    MEDICATIONS  (PRN):  hydromorphone (10 MICROgram(s)/mL) + bupivacaine 0.0625% in 0.9% Sodium Chloride PCEA Rescue Clinician  Bolus 4 milliLiter(s) Epidural every 15 minutes PRN for Pain Score greater than 6  naloxone Injectable 0.1 milliGRAM(s) IV Push every 3 minutes PRN For ANY of the following changes in patient status:  A. RR LESS THAN 10 breaths per minute, B. Oxygen saturation LESS THAN 90%, C. Sedation score of 6  ondansetron Injectable 4 milliGRAM(s) IV Push every 6 hours PRN Nausea      Physical Exam:  Constitutional: NAD, A+O x3  CV: RR S1S2 no m/r/g  Lungs: CTA b/l, good air flow b/l  Abdomen: soft, softly-distended, appropriately-tender. No guarding, no rebound, hypoactive bowel sounds; abdominal binder in place  Incision: vertical midline incision above umbilicus clean, dry, intact with Prineo in place  Extremities: no lower extremity edema or calf tenderness bilaterally; venodynes in place

## 2021-01-27 NOTE — PROGRESS NOTE ADULT - SUBJECTIVE AND OBJECTIVE BOX
Progress Note:   · Provider Specialty	Pain Medicine    Telehealth:    Telehealth:  · Telehealth?	No      · Subjective and Objective:   Anesthesia Pain Management Service: Day _2_ of Epidural    SUBJECTIVE: Patient doing well with PCEA and no problems.  Pain Scale Score:   Refer to charted pain scores    THERAPY:  [x ] Epidural Bupivacaine 0.0625% and Hydromorphone  		[ X] 10 micrograms/mL	[ ] 5 micrograms/mL  [ ] Epidural Bupivacaine 0.0625% and Fentanyl - 2 micrograms/mL  [ ] Epidural Ropivacaine 0.1% plain – 1 mg/mL  [ ] Patient Controlled Regional Anesthesia (PCRA) Ropivacaine  		[ ] 0.2%			[ ] 0.1%    Demand dose __3_ lockout __15_ (minutes) Continuous Rate _6__ Total: __92.1__ ml used (in past 24 hours)      MEDICATIONS  (STANDING):  heparin   Injectable 5000 Unit(s) SubCutaneous every 8 hours  hydromorphone (10 MICROgram(s)/mL) + bupivacaine 0.0625% in 0.9% Sodium Chloride PCEA 250 milliLiter(s) Epidural PCA Continuous  lactated ringers. 1000 milliLiter(s) (125 mL/Hr) IV Continuous <Continuous>  levothyroxine 50 MICROGram(s) Oral daily    MEDICATIONS  (PRN):  hydromorphone (10 MICROgram(s)/mL) + bupivacaine 0.0625% in 0.9% Sodium Chloride PCEA Rescue Clinician  Bolus 4 milliLiter(s) Epidural every 15 minutes PRN for Pain Score greater than 6  naloxone Injectable 0.1 milliGRAM(s) IV Push every 3 minutes PRN For ANY of the following changes in patient status:  A. RR LESS THAN 10 breaths per minute, B. Oxygen saturation LESS THAN 90%, C. Sedation score of 6  ondansetron Injectable 4 milliGRAM(s) IV Push every 6 hours PRN Nausea      OBJECTIVE: laying in bed     Assessment of Catheter Site:	[ ] Left	[ ] Right  [x ] Epidural 	[ ] Femoral	      [ ] Saphenous   [ ] Supraclavicular   [ ] Other:    [x ] Dressing intact	[x ] Site non-tender	[ x] Site without erythema, discharge, edema  [x ] Epidural tubing and connection checked	[x] Gross neurological exam within normal limits  [ ] Catheter removed – tip intact		[ ] Afebrile  	[ ] Febrile: ___   [ X] see Temp under VS below)    PT/INR - ( 27 Jan 2021 06:22 )   PT: 13.4 sec;   INR: 1.17 ratio         PTT - ( 27 Jan 2021 06:22 )  PTT:27.5 sec                      9.4    11.80 )-----------( 184      ( 27 Jan 2021 06:22 )             29.7     Vital Signs Last 24 Hrs  T(C): 36.6 (01-27-21 @ 10:00), Max: 37.6 (01-26-21 @ 19:00)  T(F): 97.8 (01-27-21 @ 10:00), Max: 99.6 (01-26-21 @ 19:00)  HR: 87 (01-27-21 @ 10:00) (77 - 98)  BP: 103/55 (01-27-21 @ 10:00) (96/58 - 133/71)  BP(mean): 80 (01-26-21 @ 18:00) (70 - 85)  RR: 18 (01-27-21 @ 10:00) (10 - 20)  SpO2: 99% (01-27-21 @ 10:00) (95% - 100%)      Sedation Score:	[x ] Alert	[ ] Drowsy	[ ] Arousable	[ ] Asleep	[ ] Unresponsive    Side Effects:	[x ] None	[ ] Nausea	[ ] Vomiting	[ ] Pruritus  		[ ] Weakness		[ ] Numbness	[ ] Other:    ASSESSMENT/ PLAN:    Therapy to  be:	[x ] Continue   [ ] Discontinued   [ ] Change to prn Analgesics    Documentation and Verification of current medications:  [ X ] Done	[ ] Not done, not eligible, reason:    Comments: Doing OK with epidural and may continue.    Progress Note written now but Patient was seen earlier.

## 2021-01-28 LAB
ANION GAP SERPL CALC-SCNC: 6 MMOL/L — LOW (ref 7–14)
APTT BLD: 23.2 SEC — LOW (ref 27–36.3)
BASOPHILS # BLD AUTO: 0.13 K/UL — SIGNIFICANT CHANGE UP (ref 0–0.2)
BASOPHILS NFR BLD AUTO: 1.8 % — SIGNIFICANT CHANGE UP (ref 0–2)
BUN SERPL-MCNC: 9 MG/DL — SIGNIFICANT CHANGE UP (ref 7–23)
CALCIUM SERPL-MCNC: 8.3 MG/DL — LOW (ref 8.4–10.5)
CHLORIDE SERPL-SCNC: 107 MMOL/L — SIGNIFICANT CHANGE UP (ref 98–107)
CO2 SERPL-SCNC: 28 MMOL/L — SIGNIFICANT CHANGE UP (ref 22–31)
CREAT SERPL-MCNC: 0.59 MG/DL — SIGNIFICANT CHANGE UP (ref 0.5–1.3)
EOSINOPHIL # BLD AUTO: 0.06 K/UL — SIGNIFICANT CHANGE UP (ref 0–0.5)
EOSINOPHIL NFR BLD AUTO: 0.9 % — SIGNIFICANT CHANGE UP (ref 0–6)
GLUCOSE SERPL-MCNC: 96 MG/DL — SIGNIFICANT CHANGE UP (ref 70–99)
HCT VFR BLD CALC: 26.9 % — LOW (ref 34.5–45)
HGB BLD-MCNC: 8.3 G/DL — LOW (ref 11.5–15.5)
IANC: 4.79 K/UL — SIGNIFICANT CHANGE UP (ref 1.5–8.5)
INR BLD: 1.08 RATIO — SIGNIFICANT CHANGE UP (ref 0.88–1.16)
LYMPHOCYTES # BLD AUTO: 0.9 K/UL — LOW (ref 1–3.3)
LYMPHOCYTES # BLD AUTO: 12.5 % — LOW (ref 13–44)
MAGNESIUM SERPL-MCNC: 1.8 MG/DL — SIGNIFICANT CHANGE UP (ref 1.6–2.6)
MCHC RBC-ENTMCNC: 27.9 PG — SIGNIFICANT CHANGE UP (ref 27–34)
MCHC RBC-ENTMCNC: 30.9 GM/DL — LOW (ref 32–36)
MCV RBC AUTO: 90.3 FL — SIGNIFICANT CHANGE UP (ref 80–100)
MONOCYTES # BLD AUTO: 0.32 K/UL — SIGNIFICANT CHANGE UP (ref 0–0.9)
MONOCYTES NFR BLD AUTO: 4.5 % — SIGNIFICANT CHANGE UP (ref 2–14)
NEUTROPHILS # BLD AUTO: 5.09 K/UL — SIGNIFICANT CHANGE UP (ref 1.8–7.4)
NEUTROPHILS NFR BLD AUTO: 70.5 % — SIGNIFICANT CHANGE UP (ref 43–77)
PHOSPHATE SERPL-MCNC: 2.2 MG/DL — LOW (ref 2.5–4.5)
PLATELET # BLD AUTO: 172 K/UL — SIGNIFICANT CHANGE UP (ref 150–400)
POTASSIUM SERPL-MCNC: 3.7 MMOL/L — SIGNIFICANT CHANGE UP (ref 3.5–5.3)
POTASSIUM SERPL-SCNC: 3.7 MMOL/L — SIGNIFICANT CHANGE UP (ref 3.5–5.3)
PROTHROM AB SERPL-ACNC: 12.3 SEC — SIGNIFICANT CHANGE UP (ref 10.6–13.6)
RBC # BLD: 2.98 M/UL — LOW (ref 3.8–5.2)
RBC # FLD: 18.1 % — HIGH (ref 10.3–14.5)
SODIUM SERPL-SCNC: 141 MMOL/L — SIGNIFICANT CHANGE UP (ref 135–145)
WBC # BLD: 7.22 K/UL — SIGNIFICANT CHANGE UP (ref 3.8–10.5)
WBC # FLD AUTO: 7.22 K/UL — SIGNIFICANT CHANGE UP (ref 3.8–10.5)

## 2021-01-28 RX ORDER — ACETAMINOPHEN 500 MG
3 TABLET ORAL
Qty: 0 | Refills: 0 | DISCHARGE
Start: 2021-01-28

## 2021-01-28 RX ORDER — SODIUM,POTASSIUM PHOSPHATES 278-250MG
1 POWDER IN PACKET (EA) ORAL
Refills: 0 | Status: COMPLETED | OUTPATIENT
Start: 2021-01-28 | End: 2021-01-29

## 2021-01-28 RX ORDER — OXYCODONE HYDROCHLORIDE 5 MG/1
10 TABLET ORAL
Refills: 0 | Status: DISCONTINUED | OUTPATIENT
Start: 2021-01-28 | End: 2021-01-29

## 2021-01-28 RX ORDER — OXYCODONE HYDROCHLORIDE 5 MG/1
1 TABLET ORAL
Qty: 12 | Refills: 0
Start: 2021-01-28

## 2021-01-28 RX ORDER — ENOXAPARIN SODIUM 100 MG/ML
40 INJECTION SUBCUTANEOUS DAILY
Refills: 0 | Status: DISCONTINUED | OUTPATIENT
Start: 2021-01-28 | End: 2021-01-29

## 2021-01-28 RX ORDER — OXYCODONE HYDROCHLORIDE 5 MG/1
5 TABLET ORAL
Refills: 0 | Status: DISCONTINUED | OUTPATIENT
Start: 2021-01-28 | End: 2021-01-29

## 2021-01-28 RX ADMIN — SODIUM CHLORIDE 3 MILLILITER(S): 9 INJECTION INTRAMUSCULAR; INTRAVENOUS; SUBCUTANEOUS at 22:27

## 2021-01-28 RX ADMIN — Medication 975 MILLIGRAM(S): at 05:46

## 2021-01-28 RX ADMIN — SODIUM CHLORIDE 3 MILLILITER(S): 9 INJECTION INTRAMUSCULAR; INTRAVENOUS; SUBCUTANEOUS at 12:12

## 2021-01-28 RX ADMIN — Medication 975 MILLIGRAM(S): at 12:11

## 2021-01-28 RX ADMIN — Medication 1 TABLET(S): at 18:01

## 2021-01-28 RX ADMIN — Medication 975 MILLIGRAM(S): at 18:00

## 2021-01-28 RX ADMIN — Medication 50 MICROGRAM(S): at 05:47

## 2021-01-28 RX ADMIN — HEPARIN SODIUM 5000 UNIT(S): 5000 INJECTION INTRAVENOUS; SUBCUTANEOUS at 02:37

## 2021-01-28 RX ADMIN — Medication 975 MILLIGRAM(S): at 22:27

## 2021-01-28 RX ADMIN — OXYCODONE HYDROCHLORIDE 10 MILLIGRAM(S): 5 TABLET ORAL at 19:59

## 2021-01-28 RX ADMIN — SODIUM CHLORIDE 3 MILLILITER(S): 9 INJECTION INTRAMUSCULAR; INTRAVENOUS; SUBCUTANEOUS at 05:47

## 2021-01-28 NOTE — PROGRESS NOTE ADULT - SUBJECTIVE AND OBJECTIVE BOX
R3 Gyn Oncology Progress Note HD#3 POD#2    Patient seen and examined at bedside, no acute overnight events. No acute complaints, pain well controlled. Patient is ambulating, passing flatus, voiding spontaneously, and tolerating regular diet. Denies CP, SOB, N/V, fevers, and chills.    Vital Signs Last 24 Hours  T(C): 36.7 (01-28-21 @ 02:15), Max: 37.2 (01-27-21 @ 21:07)  HR: 99 (01-28-21 @ 02:15) (85 - 99)  BP: 106/66 (01-28-21 @ 02:15) (102/53 - 121/66)  RR: 16 (01-28-21 @ 02:15) (16 - 18)  SpO2: 95% (01-28-21 @ 02:15) (95% - 100%)    I&O's Detail    26 Jan 2021 07:01  -  27 Jan 2021 07:00  --------------------------------------------------------  IN:    IV PiggyBack: 250 mL    Lactated Ringers: 2125 mL    Oral Fluid: 480 mL  Total IN: 2855 mL    OUT:    Estimated Blood Loss (mL): 0 mL    Indwelling Catheter - Urethral (mL): 1245 mL    Voided (mL): 0 mL  Total OUT: 1245 mL  Total NET: 1610 mL      27 Jan 2021 07:01  -  28 Jan 2021 05:16  --------------------------------------------------------  IN:    Lactated Ringers: 1375 mL    Oral Fluid: 510 mL  Total IN: 1885 mL    OUT:    Indwelling Catheter - Urethral (mL): 75 mL    Voided (mL): 700 mL  Total OUT: 775 mL    Total NET: 1110 mL          Physical Exam:  General: NAD  CV: NR, RR, S1, S2, no M/R/G  Lungs: CTA-B  Abdomen: Soft, appropriately tender, non-distended, +BS  Incision: midline vertical incision covered with Perineo c/d/i. Binder in place  Ext: No pain or swelling    Labs:               9.4    11.80 )-----------( 184      ( 01-27 @ 06:22 )             29.7           MEDICATIONS  (STANDING):  acetaminophen   Tablet .. 975 milliGRAM(s) Oral every 6 hours  heparin   Injectable 5000 Unit(s) SubCutaneous every 8 hours  hydromorphone (10 MICROgram(s)/mL) + bupivacaine 0.0625% in 0.9% Sodium Chloride PCEA 250 milliLiter(s) Epidural PCA Continuous  levothyroxine 50 MICROGram(s) Oral daily  sodium chloride 0.9% lock flush 3 milliLiter(s) IV Push every 8 hours    MEDICATIONS  (PRN):  hydromorphone (10 MICROgram(s)/mL) + bupivacaine 0.0625% in 0.9% Sodium Chloride PCEA Rescue Clinician  Bolus 4 milliLiter(s) Epidural every 15 minutes PRN for Pain Score greater than 6  naloxone Injectable 0.1 milliGRAM(s) IV Push every 3 minutes PRN For ANY of the following changes in patient status:  A. RR LESS THAN 10 breaths per minute, B. Oxygen saturation LESS THAN 90%, C. Sedation score of 6  ondansetron Injectable 4 milliGRAM(s) IV Push every 6 hours PRN Nausea

## 2021-01-28 NOTE — PROGRESS NOTE ADULT - PROBLEM SELECTOR PLAN 1
Neuro: D/c PCEA. Start po Tylenol, Oxy  CV: Hemodynamically stable. Hx of HTN. BP low-normal here, if elevated once epidural is discontinued can restart home medication  Pulm: Saturating well on RA. Increase incentive spirometry.  GI: Regular diet  : voiding spontaneously, adequate UOP  Heme: Continue HSQ/Venodynes for DVT ppx. Increase OOB.  Will need coags this AM as planning for epidural removal.  ID: Afebrile.  Endo: continue with Levothyroxine for hx of hypothyroidism  Dispo: continue inpatient care; plan for d/c home    P. Otto PGY-3  Patient seen with Gyn Oncology team  [phone]

## 2021-01-28 NOTE — PROGRESS NOTE ADULT - SUBJECTIVE AND OBJECTIVE BOX
Anesthesia Pain Management Service    SUBJECTIVE:    Therapy:	  [ ] IV PCA	   x ] Epidural           [ ] s/p Spinal Opoid              [ ] Postpartum infusion	  [ ] Patient controlled regional anesthesia (PCRA)    [ ] prn Analgesics    OBJECTIVE:   [x ] No new signs     [ ] Other:    Side Effects:  [x ] None			[ ] Other:    Assessment of Catheter Site:		[ x] Intact		[ ] Other:    ASSESSMENT/PLAN  [ ] Continue current therapy    [x ] Therapy changed to:    [ ] IV PCA       [ ] Epidural     [ x] prn Analgesics     Comments:

## 2021-01-29 ENCOUNTER — TRANSCRIPTION ENCOUNTER (OUTPATIENT)
Age: 61
End: 2021-01-29

## 2021-01-29 VITALS
TEMPERATURE: 98 F | HEART RATE: 81 BPM | DIASTOLIC BLOOD PRESSURE: 82 MMHG | SYSTOLIC BLOOD PRESSURE: 139 MMHG | RESPIRATION RATE: 19 BRPM | OXYGEN SATURATION: 100 %

## 2021-01-29 LAB
ANION GAP SERPL CALC-SCNC: 8 MMOL/L — SIGNIFICANT CHANGE UP (ref 7–14)
BASOPHILS # BLD AUTO: 0.04 K/UL — SIGNIFICANT CHANGE UP (ref 0–0.2)
BASOPHILS NFR BLD AUTO: 0.7 % — SIGNIFICANT CHANGE UP (ref 0–2)
BUN SERPL-MCNC: 7 MG/DL — SIGNIFICANT CHANGE UP (ref 7–23)
CALCIUM SERPL-MCNC: 9.4 MG/DL — SIGNIFICANT CHANGE UP (ref 8.4–10.5)
CHLORIDE SERPL-SCNC: 106 MMOL/L — SIGNIFICANT CHANGE UP (ref 98–107)
CO2 SERPL-SCNC: 27 MMOL/L — SIGNIFICANT CHANGE UP (ref 22–31)
CREAT SERPL-MCNC: 0.52 MG/DL — SIGNIFICANT CHANGE UP (ref 0.5–1.3)
EOSINOPHIL # BLD AUTO: 0.16 K/UL — SIGNIFICANT CHANGE UP (ref 0–0.5)
EOSINOPHIL NFR BLD AUTO: 3 % — SIGNIFICANT CHANGE UP (ref 0–6)
GLUCOSE SERPL-MCNC: 98 MG/DL — SIGNIFICANT CHANGE UP (ref 70–99)
HCT VFR BLD CALC: 30.3 % — LOW (ref 34.5–45)
HGB BLD-MCNC: 9.3 G/DL — LOW (ref 11.5–15.5)
IANC: 2.97 K/UL — SIGNIFICANT CHANGE UP (ref 1.5–8.5)
IMM GRANULOCYTES NFR BLD AUTO: 0.2 % — SIGNIFICANT CHANGE UP (ref 0–1.5)
LYMPHOCYTES # BLD AUTO: 1.8 K/UL — SIGNIFICANT CHANGE UP (ref 1–3.3)
LYMPHOCYTES # BLD AUTO: 33.6 % — SIGNIFICANT CHANGE UP (ref 13–44)
MAGNESIUM SERPL-MCNC: 1.9 MG/DL — SIGNIFICANT CHANGE UP (ref 1.6–2.6)
MCHC RBC-ENTMCNC: 27.9 PG — SIGNIFICANT CHANGE UP (ref 27–34)
MCHC RBC-ENTMCNC: 30.7 GM/DL — LOW (ref 32–36)
MCV RBC AUTO: 91 FL — SIGNIFICANT CHANGE UP (ref 80–100)
MONOCYTES # BLD AUTO: 0.37 K/UL — SIGNIFICANT CHANGE UP (ref 0–0.9)
MONOCYTES NFR BLD AUTO: 6.9 % — SIGNIFICANT CHANGE UP (ref 2–14)
NEUTROPHILS # BLD AUTO: 2.97 K/UL — SIGNIFICANT CHANGE UP (ref 1.8–7.4)
NEUTROPHILS NFR BLD AUTO: 55.6 % — SIGNIFICANT CHANGE UP (ref 43–77)
NRBC # BLD: 0 /100 WBCS — SIGNIFICANT CHANGE UP
NRBC # FLD: 0 K/UL — SIGNIFICANT CHANGE UP
PHOSPHATE SERPL-MCNC: 3.7 MG/DL — SIGNIFICANT CHANGE UP (ref 2.5–4.5)
PLATELET # BLD AUTO: 183 K/UL — SIGNIFICANT CHANGE UP (ref 150–400)
POTASSIUM SERPL-MCNC: 3.6 MMOL/L — SIGNIFICANT CHANGE UP (ref 3.5–5.3)
POTASSIUM SERPL-SCNC: 3.6 MMOL/L — SIGNIFICANT CHANGE UP (ref 3.5–5.3)
RBC # BLD: 3.33 M/UL — LOW (ref 3.8–5.2)
RBC # FLD: 17.6 % — HIGH (ref 10.3–14.5)
SODIUM SERPL-SCNC: 141 MMOL/L — SIGNIFICANT CHANGE UP (ref 135–145)
WBC # BLD: 5.35 K/UL — SIGNIFICANT CHANGE UP (ref 3.8–10.5)
WBC # FLD AUTO: 5.35 K/UL — SIGNIFICANT CHANGE UP (ref 3.8–10.5)

## 2021-01-29 PROCEDURE — 58953 TAH RAD DISSECT FOR DEBULK: CPT

## 2021-01-29 RX ORDER — ONDANSETRON 8 MG/1
4 TABLET, FILM COATED ORAL ONCE
Refills: 0 | Status: COMPLETED | OUTPATIENT
Start: 2021-01-29 | End: 2021-01-29

## 2021-01-29 RX ADMIN — SODIUM CHLORIDE 3 MILLILITER(S): 9 INJECTION INTRAMUSCULAR; INTRAVENOUS; SUBCUTANEOUS at 04:51

## 2021-01-29 RX ADMIN — Medication 50 MICROGRAM(S): at 05:01

## 2021-01-29 RX ADMIN — Medication 975 MILLIGRAM(S): at 12:00

## 2021-01-29 RX ADMIN — ONDANSETRON 4 MILLIGRAM(S): 8 TABLET, FILM COATED ORAL at 05:00

## 2021-01-29 RX ADMIN — SODIUM CHLORIDE 3 MILLILITER(S): 9 INJECTION INTRAMUSCULAR; INTRAVENOUS; SUBCUTANEOUS at 14:45

## 2021-01-29 RX ADMIN — Medication 1 TABLET(S): at 09:58

## 2021-01-29 RX ADMIN — ENOXAPARIN SODIUM 40 MILLIGRAM(S): 100 INJECTION SUBCUTANEOUS at 12:00

## 2021-01-29 NOTE — PROGRESS NOTE ADULT - PROBLEM SELECTOR PLAN 1
Neuro: C/w po Tylenol, Oxy  CV: Hemodynamically stable. Hx of HTN. BP low-normal here, if elevated can restart home medication  Pulm: Saturating well on RA. Increase incentive spirometry.  GI: Regular diet  : voiding spontaneously, adequate UOP  Heme: Continue Lovenox/Venodynes for DVT ppx. Increase OOB. Will continue with Apixaban at home 2/2 increased hypercoagulable state from both malignancy and large abdominal incision  ID: Afebrile.  Endo: continue with Levothyroxine for hx of hypothyroidism  Dispo: continue inpatient care; plan for d/c home today    BERRY Menjivar PGY-3  Patient seen with Gyn Oncology team  [phone]

## 2021-01-29 NOTE — DISCHARGE NOTE NURSING/CASE MANAGEMENT/SOCIAL WORK - NSDCPNINST_GEN_ALL_CORE
Call your provider for your follow-up appointment.  Call your provider or go to ED for fever and/chills, persistent nausea, vomiting, abnormal bleeding, severe pain uncontrolled by pain medication.

## 2021-01-29 NOTE — PROGRESS NOTE ADULT - ASSESSMENT
Assessment/Plan: 59yo with metastatic carcinomatosis s/p 3 cycles of NACT now POD#1 s/p ex-lap ALYSSA BSO, debulking, omentectomy, appendectomy, cholecystectomy. Patient is doing well this AM and meeting appropriate post-op milestones.       
A/P: 61 Y/O S/P X Lap ALYSSA, BSO, Debulking, BERTRAM, Appendectomy, Cholecystectomy  Plan  1. Patient encouraged to use Incentive Spirometer  2. Pain management; PCEA and Ofirmev with timed doses  3. IV Fluids LR @125mL/hr  4. Clear Liquids diet  5. Mason to gravity  6. Heparin 5000 units subcut Q 8 hours  7. Synthroid 50 micrograms PO daily  8. Continuous pulse ox monitoring  9. CBC, BMP, Mg, Phos & Coags in AM POD#1  
Assessment/Plan: 59yo with metastatic carcinomatosis s/p 3 cycles of NACT now POD#3 s/p ex-lap ALYSSA BSO, optimal interval debulking, omentectomy, appendectomy, cholecystectomy. Patient is doing well this AM and meeting appropriate post-op milestones.       
Assessment/Plan: 59yo with metastatic carcinomatosis s/p 3 cycles of NACT now POD#2 s/p ex-lap ALYSSA BSO, optimal interval debulking, omentectomy, appendectomy, cholecystectomy. Patient is doing well this AM and meeting appropriate post-op milestones.

## 2021-01-29 NOTE — PROGRESS NOTE ADULT - SUBJECTIVE AND OBJECTIVE BOX
R2 Gyn ONC Progress Note POD#3  HD#4    Subjective:   Pt seen and examined at bedside. No events overnight. Pain well controlled. Patient ambulating. Passing flatus. Tolerating regular diet. Pt denies fever, chills, chest pain, SOB, nausea, vomiting, lightheadedness, dizziness.      Objective:  T(F): 97.8 (01-29-21 @ 04:48), Max: 99.1 (01-28-21 @ 10:00)  HR: 75 (01-29-21 @ 04:48) (72 - 96)  BP: 132/83 (01-29-21 @ 04:48) (114/70 - 132/83)  RR: 18 (01-29-21 @ 04:48) (16 - 18)  SpO2: 95% (01-29-21 @ 04:48) (94% - 99%)  Wt(kg): --  I&O's Summary    27 Jan 2021 07:01  -  28 Jan 2021 07:00  --------------------------------------------------------  IN: 1885 mL / OUT: 775 mL / NET: 1110 mL    MEDICATIONS  (STANDING):  acetaminophen   Tablet .. 975 milliGRAM(s) Oral every 6 hours  enoxaparin Injectable 40 milliGRAM(s) SubCutaneous daily  levothyroxine 50 MICROGram(s) Oral daily  potassium phosphate / sodium phosphate Tablet (K-PHOS No. 2) 1 Tablet(s) Oral two times a day with meals  sodium chloride 0.9% lock flush 3 milliLiter(s) IV Push every 8 hours    MEDICATIONS  (PRN):  oxyCODONE    IR 5 milliGRAM(s) Oral every 3 hours PRN Moderate Pain (4 - 6)  oxyCODONE    IR 10 milliGRAM(s) Oral every 3 hours PRN Severe Pain (7 - 10)    Physical Exam:  Constitutional: NAD, A+O x3  CV: RR S1S2 no m/r/g  Lungs: CTA b/l, good air flow b/l  Abdomen: soft, softly-distended, appropriately-tender. no guarding, no rebound, normal bowel sounds  Incision: midline vertical incision clean, dry, intact  Extremities: no lower extremity edema or calf tenderness bilaterally; venodynes in place    LABS:  01-28    141    |  107    |  9      ----------------------------<  96     3.7     |  28     |  0.59     Ca    8.3<L>      28 Jan 2021 07:49  Phos  2.2       01-28  Mg     1.8       01-28      PT/INR - ( 28 Jan 2021 07:49 )   PT: 12.3 sec;   INR: 1.08 ratio    PTT - ( 28 Jan 2021 07:49 )  PTT:23.2 sec       R3 Gyn ONC Progress Note POD#3  HD#4    Subjective:   Pt seen and examined at bedside. No events overnight. Pain well controlled. Patient ambulating. Passing flatus. Tolerating regular diet. Pt denies fever, chills, chest pain, SOB, nausea, vomiting, lightheadedness, dizziness.      Objective:  T(F): 97.8 (01-29-21 @ 04:48), Max: 99.1 (01-28-21 @ 10:00)  HR: 75 (01-29-21 @ 04:48) (72 - 96)  BP: 132/83 (01-29-21 @ 04:48) (114/70 - 132/83)  RR: 18 (01-29-21 @ 04:48) (16 - 18)  SpO2: 95% (01-29-21 @ 04:48) (94% - 99%)  Wt(kg): --  I&O's Summary    27 Jan 2021 07:01  -  28 Jan 2021 07:00  --------------------------------------------------------  IN: 1885 mL / OUT: 775 mL / NET: 1110 mL    MEDICATIONS  (STANDING):  acetaminophen   Tablet .. 975 milliGRAM(s) Oral every 6 hours  enoxaparin Injectable 40 milliGRAM(s) SubCutaneous daily  levothyroxine 50 MICROGram(s) Oral daily  potassium phosphate / sodium phosphate Tablet (K-PHOS No. 2) 1 Tablet(s) Oral two times a day with meals  sodium chloride 0.9% lock flush 3 milliLiter(s) IV Push every 8 hours    MEDICATIONS  (PRN):  oxyCODONE    IR 5 milliGRAM(s) Oral every 3 hours PRN Moderate Pain (4 - 6)  oxyCODONE    IR 10 milliGRAM(s) Oral every 3 hours PRN Severe Pain (7 - 10)    Physical Exam:  Constitutional: NAD, A+O x3  CV: RR S1S2 no m/r/g  Lungs: CTA b/l, good air flow b/l  Abdomen: soft, softly-distended, appropriately-tender. no guarding, no rebound, normal bowel sounds  Incision: midline vertical incision clean, dry, intact  Extremities: no lower extremity edema or calf tenderness bilaterally; venodynes in place    LABS:  01-28    141    |  107    |  9      ----------------------------<  96     3.7     |  28     |  0.59     Ca    8.3<L>      28 Jan 2021 07:49  Phos  2.2       01-28  Mg     1.8       01-28      PT/INR - ( 28 Jan 2021 07:49 )   PT: 12.3 sec;   INR: 1.08 ratio    PTT - ( 28 Jan 2021 07:49 )  PTT:23.2 sec

## 2021-01-29 NOTE — DISCHARGE NOTE NURSING/CASE MANAGEMENT/SOCIAL WORK - PATIENT PORTAL LINK FT
You can access the FollowMyHealth Patient Portal offered by [facility] by registering at the following website: [email or web address]. By joining [employer]’s FollowMyHealth portal, you will also be able to view your health information using other applications (apps) compatible with our system.

## 2021-01-29 NOTE — PROGRESS NOTE ADULT - PROBLEM SELECTOR PLAN 2
Fellow Note    Patient seen and examined. Agree with above.    VS reviewed  Labs reviewed    Reg diet  OOB  VTE ppx  YELNEA Gilbert MD
Fellow Note    Patient seen and examined. Agree with above.    VS reviewed  Labs reviewed    Reg diet  OOB  VTE ppx  PO analgesia  Plan for dc home today. Instructions given. Questions answered.     Vladimir STUART

## 2021-01-29 NOTE — PROGRESS NOTE ADULT - ATTENDING COMMENTS
patient seen and evaluated, ambulating well in hallway  no n/v/cp/sob  johnathan reg diet  voiding freely  plan for discharge today, f/u 2 wks

## 2021-02-02 ENCOUNTER — NON-APPOINTMENT (OUTPATIENT)
Age: 61
End: 2021-02-02

## 2021-02-04 ENCOUNTER — NON-APPOINTMENT (OUTPATIENT)
Age: 61
End: 2021-02-04

## 2021-02-04 ENCOUNTER — APPOINTMENT (OUTPATIENT)
Dept: GYNECOLOGIC ONCOLOGY | Facility: CLINIC | Age: 61
End: 2021-02-04
Payer: MEDICAID

## 2021-02-04 ENCOUNTER — EMERGENCY (EMERGENCY)
Facility: HOSPITAL | Age: 61
LOS: 1 days | Discharge: HOME | End: 2021-02-04
Attending: EMERGENCY MEDICINE | Admitting: EMERGENCY MEDICINE
Payer: MEDICAID

## 2021-02-04 VITALS
RESPIRATION RATE: 18 BRPM | HEIGHT: 61 IN | OXYGEN SATURATION: 97 % | TEMPERATURE: 98 F | SYSTOLIC BLOOD PRESSURE: 141 MMHG | DIASTOLIC BLOOD PRESSURE: 77 MMHG | HEART RATE: 125 BPM

## 2021-02-04 VITALS
BODY MASS INDEX: 26.43 KG/M2 | HEIGHT: 61 IN | HEART RATE: 122 BPM | SYSTOLIC BLOOD PRESSURE: 133 MMHG | TEMPERATURE: 98.7 F | DIASTOLIC BLOOD PRESSURE: 76 MMHG | WEIGHT: 140 LBS | OXYGEN SATURATION: 98 %

## 2021-02-04 VITALS — HEART RATE: 101 BPM

## 2021-02-04 DIAGNOSIS — Z09 ENCOUNTER FOR FOLLOW-UP EXAMINATION AFTER COMPLETED TREATMENT FOR CONDITIONS OTHER THAN MALIGNANT NEOPLASM: ICD-10-CM

## 2021-02-04 DIAGNOSIS — Z98.890 OTHER SPECIFIED POSTPROCEDURAL STATES: Chronic | ICD-10-CM

## 2021-02-04 PROBLEM — E78.5 HYPERLIPIDEMIA, UNSPECIFIED: Chronic | Status: ACTIVE | Noted: 2021-01-21

## 2021-02-04 PROBLEM — E03.9 HYPOTHYROIDISM, UNSPECIFIED: Chronic | Status: ACTIVE | Noted: 2021-01-21

## 2021-02-04 LAB
ANION GAP SERPL CALC-SCNC: 16 MMOL/L
APTT BLD: 31.3 SEC — SIGNIFICANT CHANGE UP (ref 27–36.3)
BASOPHILS # BLD AUTO: 0.04 K/UL — SIGNIFICANT CHANGE UP (ref 0–0.2)
BASOPHILS NFR BLD AUTO: 0.4 % — SIGNIFICANT CHANGE UP (ref 0–2)
BLOOD GAS VENOUS COMPREHENSIVE RESULT: SIGNIFICANT CHANGE UP
BUN SERPL-MCNC: 10 MG/DL
CALCIUM SERPL-MCNC: 9.6 MG/DL
CHLORIDE SERPL-SCNC: 101 MMOL/L
CHLORIDE SERPL-SCNC: 102 MMOL/L — SIGNIFICANT CHANGE UP (ref 98–107)
CO2 SERPL-SCNC: 20 MMOL/L
CREAT SERPL-MCNC: 0.81 MG/DL
EOSINOPHIL # BLD AUTO: 0.05 K/UL — SIGNIFICANT CHANGE UP (ref 0–0.5)
EOSINOPHIL NFR BLD AUTO: 0.5 % — SIGNIFICANT CHANGE UP (ref 0–6)
GLUCOSE SERPL-MCNC: 92 MG/DL
HCT VFR BLD CALC: 34.6 % — SIGNIFICANT CHANGE UP (ref 34.5–45)
HGB BLD-MCNC: 10.6 G/DL — LOW (ref 11.5–15.5)
IANC: 8.84 K/UL — HIGH (ref 1.5–8.5)
IMM GRANULOCYTES NFR BLD AUTO: 0.8 % — SIGNIFICANT CHANGE UP (ref 0–1.5)
INR BLD: 1.18 RATIO — HIGH (ref 0.88–1.16)
LYMPHOCYTES # BLD AUTO: 1.05 K/UL — SIGNIFICANT CHANGE UP (ref 1–3.3)
LYMPHOCYTES # BLD AUTO: 9.9 % — LOW (ref 13–44)
MCHC RBC-ENTMCNC: 27.9 PG — SIGNIFICANT CHANGE UP (ref 27–34)
MCHC RBC-ENTMCNC: 30.6 GM/DL — LOW (ref 32–36)
MCV RBC AUTO: 91.1 FL — SIGNIFICANT CHANGE UP (ref 80–100)
MONOCYTES # BLD AUTO: 0.5 K/UL — SIGNIFICANT CHANGE UP (ref 0–0.9)
MONOCYTES NFR BLD AUTO: 4.7 % — SIGNIFICANT CHANGE UP (ref 2–14)
NEUTROPHILS # BLD AUTO: 8.84 K/UL — HIGH (ref 1.8–7.4)
NEUTROPHILS NFR BLD AUTO: 83.7 % — HIGH (ref 43–77)
NRBC # BLD: 0 /100 WBCS — SIGNIFICANT CHANGE UP
NRBC # FLD: 0 K/UL — SIGNIFICANT CHANGE UP
PLATELET # BLD AUTO: 325 K/UL — SIGNIFICANT CHANGE UP (ref 150–400)
POTASSIUM SERPL-MCNC: 4.7 MMOL/L — SIGNIFICANT CHANGE UP (ref 3.5–5.3)
POTASSIUM SERPL-SCNC: 4.3 MMOL/L
POTASSIUM SERPL-SCNC: 4.7 MMOL/L — SIGNIFICANT CHANGE UP (ref 3.5–5.3)
PROTHROM AB SERPL-ACNC: 13.5 SEC — SIGNIFICANT CHANGE UP (ref 10.6–13.6)
RBC # BLD: 3.8 M/UL — SIGNIFICANT CHANGE UP (ref 3.8–5.2)
RBC # FLD: 17.8 % — HIGH (ref 10.3–14.5)
SODIUM SERPL-SCNC: 136 MMOL/L
SODIUM SERPL-SCNC: 136 MMOL/L — SIGNIFICANT CHANGE UP (ref 135–145)
WBC # BLD: 10.56 K/UL — HIGH (ref 3.8–10.5)
WBC # FLD AUTO: 10.56 K/UL — HIGH (ref 3.8–10.5)

## 2021-02-04 PROCEDURE — 99024 POSTOP FOLLOW-UP VISIT: CPT

## 2021-02-04 PROCEDURE — 93010 ELECTROCARDIOGRAM REPORT: CPT

## 2021-02-04 PROCEDURE — 99284 EMERGENCY DEPT VISIT MOD MDM: CPT | Mod: 25

## 2021-02-04 RX ORDER — SODIUM CHLORIDE 9 MG/ML
1000 INJECTION INTRAMUSCULAR; INTRAVENOUS; SUBCUTANEOUS ONCE
Refills: 0 | Status: COMPLETED | OUTPATIENT
Start: 2021-02-04 | End: 2021-02-04

## 2021-02-04 RX ADMIN — SODIUM CHLORIDE 1000 MILLILITER(S): 9 INJECTION INTRAMUSCULAR; INTRAVENOUS; SUBCUTANEOUS at 22:57

## 2021-02-04 NOTE — ED PROVIDER NOTE - NSFOLLOWUPINSTRUCTIONS_ED_ALL_ED_FT
You were seen in the emergency department for soft stools.    You had blood work, a chest x-ray, and a urinalysis performed. A copy of your results were provided to you.    Please make sure to stay hydrated and drink plenty of fluids.     Please follow up with Dr. Guerra. Please call to make an appointment.    Please return to the emergency department for worsening of your symptoms.

## 2021-02-04 NOTE — ED PROVIDER NOTE - NS ED ROS FT
Constitutional: no fevers; no chills  HEENT: no visual changes, no sore throat, no rhinorrhea  CV: no cp; no palpitations  Resp: no sob; no cough  GI: no abd pain, no nausea, no vomiting, diarrhea, no constipation  : no dysuria, no hematuria  MSK: no myalgais; no arthralgias  skin: no rashes  neuro: no HA, no numbness; no weakness, no tingling  ROS statement: all other ROS negative except as per HPI

## 2021-02-04 NOTE — ED PROVIDER NOTE - CARE PLAN
Principal Discharge DX:	Diarrhea   Principal Discharge DX:	Diarrhea  Secondary Diagnosis:	Tachycardia

## 2021-02-04 NOTE — ED PROVIDER NOTE - PROGRESS NOTE DETAILS
gyn onc fellow evaluated patient in ed, agreeable with plan Rahul Pritchett MD. pt vitals improved. w/u noted and explained to pt and her daughter. pt to be discharged. pt to f/u with  Dr. Concepcion.

## 2021-02-04 NOTE — ED PROVIDER NOTE - PHYSICAL EXAMINATION
GEN - NAD; well appearing; A+O x3   HEAD - NC/AT   EYES- PERRL, EOMI  ENT: Airway patent, mmm, Oral cavity and pharynx normal. No inflammation, swelling, exudate, or lesions.  NECK: Neck supple, non-tender without lymphadenopathy, no masses.  PULMONARY - CTA b/l, symmetric breath sounds.   CARDIAC -s1s2, mild tachy, (hr 108 in room) RR, no M,G,R  ABDOMEN - +BS, ND, NT, soft, no guarding, no rebound, + midline incision well healing, no surrounding/warmth/induration, no fluctuance.  BACK - no CVA tenderness, Normal  spine   EXTREMITIES - FROM, symmetric pulses, capillary refill < 2 seconds, no edema   SKIN - no rash or bruising   NEUROLOGIC - alert, speech clear, no focal deficits  PSYCH -nl mood/affect, nl insight.

## 2021-02-04 NOTE — ED PROVIDER NOTE - PATIENT PORTAL LINK FT
You can access the FollowMyHealth Patient Portal offered by [facility] by registering at the following website: [email or web address]. By joining [employer]’s FollowMyHealth portal, you will also be able to view your health information using other applications (apps) compatible with our system.

## 2021-02-04 NOTE — ED PROVIDER NOTE - OBJECTIVE STATEMENT
59 y/o f presents to the ed with tachycardia. Patient has h/o flash/bso/appendectomy, cholecystectomy, omentectomy last week-for l. ovarian CA with mets to live and left lung nodule s/p chemo until end of decmeber. Patient had multiple soft brown bms earlier today, a short episode of chills, evaluated by her doctor and sent to ed for evaluation for tachycardia. 61 y/o f presents to the ed with tachycardia. Patient has h/o flash/bso/appendectomy, cholecystectomy, omentectomy last week-for l. ovarian CA with mets to live and left lung nodule s/p chemo until end of decmeber. Patient had multiple soft brown bms earlier today, a short episode of chills, evaluated by her doctor and sent to ed for evaluation for tachycardia.  [facility]. Daughter Marsha ([phone]) provided history.  Rahul Pritchett MD. 59 yo F PMHx hypothyroid, HLD, on 1/26/21, s/p FLASH/BSO/Appy/Angélica, omentetomy 2/2 ovarian CA w/ mets to liver and L l jassi nodule s/p chemo and carcinomatosis, presesnts for 5-6 soft brown stools (not watery) today. Went to GYN/onc's office today and noted to be tachycardic and was told to come to ED for evaluation. Denies abd pain, fever, nausea, vomiting, cp, sob, cough, dysuria, leg swelling.

## 2021-02-04 NOTE — CONSULT NOTE ADULT - SUBJECTIVE AND OBJECTIVE BOX
Translation per daughter    INES DOCKERY  60y  Female [id number]    HPI:  60yoF s/p Ex-Lap Total Abdominal Hysterectomy Bilateral-Salpingoopherectomy, Appendectomy, Choleycystectomy, omentectomy p/w 5 soft stools today and found to be tachycardic in  office. Patient states recovery has been well. She is tolerating PO, voiding spontaneously, and pain is minimal (2/10). Sent in from office due to tachycardia. Denies CP, SOB, abdominal pain. Patient states she also had some pain with urination today.    Allergies: No Known Allergies      PAST MEDICAL & SURGICAL HISTORY:  Malignant neoplasm  left ovarian, s/p chemotherapy    Hyperlipidemia    Hypothyroidism    HTN (hypertension)    H/O breast surgery  I&D left mastitis 27y/o      Vital Signs Last 24 Hrs  T(C): 36.8 (04 Feb 2021 17:50), Max: 36.8 (04 Feb 2021 17:50)  T(F): 98.2 (04 Feb 2021 17:50), Max: 98.2 (04 Feb 2021 17:50)  HR: 125 (04 Feb 2021 17:50) (125 - 125)  BP: 141/77 (04 Feb 2021 17:50) (141/77 - 141/77)  BP(mean): --  RR: 18 (04 Feb 2021 17:50) (18 - 18)  SpO2: 97% (04 Feb 2021 17:50) (97% - 97%)    Physical Exam:   General: sitting comfortably in bed, NAD   HEENT: neck supple, full ROM  CV: Tachycardic, S1S2 no m/r/g  Lungs: CTA b/l, good air flow b/l   Back: No CVA tenderness  Abd: Soft, non-tender, non-distended.  Bowel sounds present.  - Vertical skin incision C/D/I      Ext: non-tender b/l, no edema     LABS:  []PENDING  RADIOLOGY & ADDITIONAL STUDIES:  []PENDING

## 2021-02-04 NOTE — CONSULT NOTE ADULT - ASSESSMENT
60yoF s/p Ex-Lap Total Abdominal Hysterectomy Bilateral-Salpingoopherectomy, Appendectomy, Choleycystectomy, omentectomy p/w 5 soft stools today and found to be tachycardic in  office.  - CBC/BMP/Coags  - EKG  - CXR    TBS by Dr.Nizam Armando Tripathi PGY2  60yoF s/p Ex-Lap Total Abdominal Hysterectomy Bilateral-Salpingoopherectomy, Appendectomy, Choleycystectomy, omentectomy p/w 5 soft stools today and found to be tachycardic in  office.  - CBC/BMP/Coags  - EKG  - CXR  - U/A w/ reflex culture    TBS by Dr.Nizam Armando Tripathi PGY2

## 2021-02-04 NOTE — ED ADULT TRIAGE NOTE - CHIEF COMPLAINT QUOTE
c/o multiple episodes of soft stools, 8 times today, s/p hysterectomy, sent in by PMD  ofice for tacycardia. pt denies c/op, Hx o ovarican CA, not currently on chemo or radiation.

## 2021-02-05 LAB
ALBUMIN SERPL ELPH-MCNC: 4.1 G/DL — SIGNIFICANT CHANGE UP (ref 3.3–5)
ALP SERPL-CCNC: 268 U/L — HIGH (ref 40–120)
ALT FLD-CCNC: 119 U/L — HIGH (ref 4–33)
ANION GAP SERPL CALC-SCNC: 12 MMOL/L — SIGNIFICANT CHANGE UP (ref 7–14)
APPEARANCE UR: CLEAR — SIGNIFICANT CHANGE UP
APPEARANCE: CLEAR
AST SERPL-CCNC: 42 U/L — HIGH (ref 4–32)
BILIRUB SERPL-MCNC: 0.5 MG/DL — SIGNIFICANT CHANGE UP (ref 0.2–1.2)
BILIRUB UR-MCNC: NEGATIVE — SIGNIFICANT CHANGE UP
BILIRUBIN URINE: NEGATIVE
BLOOD URINE: NORMAL
BUN SERPL-MCNC: 10 MG/DL — SIGNIFICANT CHANGE UP (ref 7–23)
CALCIUM SERPL-MCNC: 10 MG/DL — SIGNIFICANT CHANGE UP (ref 8.4–10.5)
CO2 SERPL-SCNC: 22 MMOL/L — SIGNIFICANT CHANGE UP (ref 22–31)
COLOR SPEC: SIGNIFICANT CHANGE UP
COLOR: NORMAL
CREAT SERPL-MCNC: 0.54 MG/DL — SIGNIFICANT CHANGE UP (ref 0.5–1.3)
DIFF PNL FLD: NEGATIVE — SIGNIFICANT CHANGE UP
GLUCOSE QUALITATIVE U: NEGATIVE
GLUCOSE SERPL-MCNC: 96 MG/DL — SIGNIFICANT CHANGE UP (ref 70–99)
GLUCOSE UR QL: NEGATIVE — SIGNIFICANT CHANGE UP
KETONES UR-MCNC: NEGATIVE — SIGNIFICANT CHANGE UP
KETONES URINE: NEGATIVE
LEUKOCYTE ESTERASE UR-ACNC: NEGATIVE — SIGNIFICANT CHANGE UP
LEUKOCYTE ESTERASE URINE: NEGATIVE
NITRITE UR-MCNC: NEGATIVE — SIGNIFICANT CHANGE UP
NITRITE URINE: NEGATIVE
PH UR: 6 — SIGNIFICANT CHANGE UP (ref 5–8)
PH URINE: 6.5
PROT SERPL-MCNC: 7.9 G/DL — SIGNIFICANT CHANGE UP (ref 6–8.3)
PROT UR-MCNC: NEGATIVE — SIGNIFICANT CHANGE UP
PROTEIN URINE: NEGATIVE
SARS-COV-2 RNA SPEC QL NAA+PROBE: SIGNIFICANT CHANGE UP
SP GR SPEC: 1.01 — LOW (ref 1.01–1.02)
SPECIFIC GRAVITY URINE: 1.02
UROBILINOGEN FLD QL: SIGNIFICANT CHANGE UP
UROBILINOGEN URINE: NORMAL

## 2021-02-05 PROCEDURE — 71045 X-RAY EXAM CHEST 1 VIEW: CPT | Mod: 26

## 2021-02-05 NOTE — CHART NOTE - NSCHARTNOTEFT_GEN_A_CORE
60yoF s/p Ex-Lap Total Abdominal Hysterectomy Bilateral-Salpingoopherectomy, Appendectomy, Choleycystectomy, omentectomy p/w 5 soft stools today and found to be tachycardic in Dr. Quiros office.               10.6   10.56 )-----------( 325      ( 02-04 @ 23:23 )             34.6     02-04 @ 23:23    136  |  102  |  x   ----------------------------<  x   4.7   |  x   |  x         PT/INR - ( 02-04 @ 23:23 )   PT: 13.5 sec;   INR: 1.18 ratio    PTT - ( 02-04 @ 23:23 )  PTT:31.3 sec    EKG: Sinus tachycardia  CXR: prelim, clear lungs    UA pending    Pt s/p 1L bolus in ED with HR in low 100s.     Pt feeling improved, with no complaints at this time. Spoke with daughter over phone interpreting.     - Pending UA collection with reflex culture 60yoF s/p Ex-Lap Total Abdominal Hysterectomy Bilateral-Salpingoopherectomy, Appendectomy, Choleycystectomy, omentectomy p/w 5 soft stools today and found to be tachycardic in Dr. Quiros office.               10.6   10.56 )-----------( 325      ( 02-04 @ 23:23 )             34.6     02-04 @ 23:23    136  |  102  |  x   ----------------------------<  x   4.7   |  x   |  x         PT/INR - ( 02-04 @ 23:23 )   PT: 13.5 sec;   INR: 1.18 ratio    PTT - ( 02-04 @ 23:23 )  PTT:31.3 sec    EKG: Sinus tachycardia  CXR: prelim, clear lungs    UA pending    Pt s/p 1L bolus in ED with HR in low 100s.     Pt feeling improved, with no complaints at this time. Spoke with daughter over phone interpreting.     - Pending UA collection with reflex culture  - If UA negative, pt cleared for discharge home from GYN team  - Pt to follow up with Dr. Concepcion's office for post operative appt    dw Dr. Gilbert, Gyn Onc Fellow  Darrell PGY2

## 2021-02-06 LAB
CULTURE RESULTS: SIGNIFICANT CHANGE UP
SPECIMEN SOURCE: SIGNIFICANT CHANGE UP

## 2021-02-07 ENCOUNTER — NON-APPOINTMENT (OUTPATIENT)
Age: 61
End: 2021-02-07

## 2021-02-07 DIAGNOSIS — A04.72 ENTEROCOLITIS DUE TO CLOSTRIDIUM DIFFICILE, NOT SPECIFIED AS RECURRENT: ICD-10-CM

## 2021-02-07 LAB — BACTERIA UR CULT: NORMAL

## 2021-02-07 RX ORDER — METRONIDAZOLE 500 MG/1
500 TABLET ORAL 3 TIMES DAILY
Qty: 30 | Refills: 0 | Status: ACTIVE | COMMUNITY
Start: 2021-02-07 | End: 1900-01-01

## 2021-02-08 ENCOUNTER — APPOINTMENT (OUTPATIENT)
Dept: GYNECOLOGIC ONCOLOGY | Facility: CLINIC | Age: 61
End: 2021-02-08
Payer: MEDICAID

## 2021-02-08 LAB
C DIFF TOX GENS STL QL NAA+PROBE: NORMAL
CDIFF BY PCR: DETECTED

## 2021-02-10 ENCOUNTER — NON-APPOINTMENT (OUTPATIENT)
Age: 61
End: 2021-02-10

## 2021-02-11 PROBLEM — Z09 POSTOPERATIVE EXAMINATION: Status: ACTIVE | Noted: 2021-02-11

## 2021-02-11 NOTE — REASON FOR VISIT
[Post Op] : post op visit [de-identified] : 1/25/2021 [de-identified] : Ex Lap, ALYSSA, BSO, tumor debulking, omentectomy, appendectomy, cholecystectomy (pt is 60 years old) [de-identified] : Patient called with multiple bowel movements.  TMAX of 100, chills, dysuria and back pain.  She denies nausea/vomiting.  She has a good appetite.

## 2021-02-11 NOTE — DISCUSSION/SUMMARY
[Clean] : was clean [Dry] : was dry [Intact] : was intact [Erythema] : was not erythematous [Ecchymosis] : was not ecchymotic [Seroma] : had no seroma [Sutures Intact] : had sutures  intact [None] : had no drainage [Normal Skin] : normal appearance [Normal Skin Turgor] : normal skin turgor [Firm] : soft [Tender] : nontender [Rebound] : no rebound tenderness [Guarding] : no guarding [Incisional Hernia] : no incisional hernia [Mass] : no palpable mass [de-identified] : deferred no vaginal bleeding  [de-identified] : multiple bowel movements with dysuria [de-identified] : pending  [de-identified] : tolerating PO

## 2021-02-18 PROBLEM — Z48.89 POSTOPERATIVE VISIT: Status: ACTIVE | Noted: 2021-02-06

## 2021-02-19 LAB — SURGICAL PATHOLOGY STUDY: SIGNIFICANT CHANGE UP

## 2021-02-22 ENCOUNTER — APPOINTMENT (OUTPATIENT)
Dept: GYNECOLOGIC ONCOLOGY | Facility: CLINIC | Age: 61
End: 2021-02-22
Payer: MEDICAID

## 2021-02-22 VITALS
TEMPERATURE: 97 F | SYSTOLIC BLOOD PRESSURE: 143 MMHG | HEART RATE: 96 BPM | DIASTOLIC BLOOD PRESSURE: 74 MMHG | OXYGEN SATURATION: 98 % | HEIGHT: 61 IN | WEIGHT: 138 LBS | BODY MASS INDEX: 26.06 KG/M2

## 2021-02-22 DIAGNOSIS — C24.9 MALIGNANT NEOPLASM OF BILIARY TRACT, UNSPECIFIED: ICD-10-CM

## 2021-02-22 DIAGNOSIS — Z48.89 ENCOUNTER FOR OTHER SPECIFIED SURGICAL AFTERCARE: ICD-10-CM

## 2021-02-22 PROCEDURE — 99024 POSTOP FOLLOW-UP VISIT: CPT

## 2021-02-24 ENCOUNTER — APPOINTMENT (OUTPATIENT)
Dept: GYNECOLOGIC ONCOLOGY | Facility: CLINIC | Age: 61
End: 2021-02-24
Payer: MEDICAID

## 2021-03-03 ENCOUNTER — NON-APPOINTMENT (OUTPATIENT)
Age: 61
End: 2021-03-03

## 2021-04-02 PROBLEM — C79.9: Status: ACTIVE | Noted: 2021-04-02

## 2021-04-02 PROBLEM — Z09 FOLLOW UP: Status: ACTIVE | Noted: 2021-04-02

## 2021-04-02 PROBLEM — C22.1 CHOLANGIOCARCINOMA: Status: ACTIVE | Noted: 2021-04-02

## 2021-04-05 ENCOUNTER — APPOINTMENT (OUTPATIENT)
Dept: GYNECOLOGIC ONCOLOGY | Facility: CLINIC | Age: 61
End: 2021-04-05
Payer: MEDICAID

## 2021-04-05 VITALS
SYSTOLIC BLOOD PRESSURE: 120 MMHG | TEMPERATURE: 97.3 F | WEIGHT: 139 LBS | OXYGEN SATURATION: 98 % | HEIGHT: 60 IN | HEART RATE: 94 BPM | DIASTOLIC BLOOD PRESSURE: 74 MMHG | BODY MASS INDEX: 27.29 KG/M2

## 2021-04-05 DIAGNOSIS — Z09 ENCOUNTER FOR FOLLOW-UP EXAMINATION AFTER COMPLETED TREATMENT FOR CONDITIONS OTHER THAN MALIGNANT NEOPLASM: ICD-10-CM

## 2021-04-05 DIAGNOSIS — C79.9 SECONDARY MALIGNANT NEOPLASM OF UNSPECIFIED SITE: ICD-10-CM

## 2021-04-05 DIAGNOSIS — C22.1 INTRAHEPATIC BILE DUCT CARCINOMA: ICD-10-CM

## 2021-04-05 DIAGNOSIS — C23 SECONDARY MALIGNANT NEOPLASM OF UNSPECIFIED SITE: ICD-10-CM

## 2021-04-05 PROCEDURE — 99024 POSTOP FOLLOW-UP VISIT: CPT

## 2021-04-06 RX ORDER — ZOLPIDEM TARTRATE 5 MG/1
5 TABLET ORAL
Qty: 15 | Refills: 0 | Status: ACTIVE | COMMUNITY
Start: 2020-11-20

## 2021-04-06 RX ORDER — APIXABAN 2.5 MG/1
2.5 TABLET, FILM COATED ORAL
Qty: 56 | Refills: 0 | Status: ACTIVE | COMMUNITY
Start: 2021-01-27

## 2021-04-06 RX ORDER — TELMISARTAN 20 MG/1
20 TABLET ORAL
Qty: 90 | Refills: 0 | Status: ACTIVE | COMMUNITY
Start: 2020-03-11

## 2021-04-06 RX ORDER — CALCIUM CARBONATE/VITAMIN D3 600MG-5MCG
600-200 TABLET ORAL
Qty: 30 | Refills: 0 | Status: ACTIVE | COMMUNITY
Start: 2020-10-25

## 2021-04-06 RX ORDER — LEVOFLOXACIN 500 MG/1
500 TABLET, FILM COATED ORAL
Qty: 7 | Refills: 0 | Status: ACTIVE | COMMUNITY
Start: 2020-11-19

## 2021-04-06 RX ORDER — ONDANSETRON 8 MG/1
8 TABLET ORAL
Qty: 18 | Refills: 0 | Status: ACTIVE | COMMUNITY
Start: 2020-11-11

## 2021-04-06 RX ORDER — OXYCODONE 5 MG/1
5 TABLET ORAL
Qty: 12 | Refills: 0 | Status: ACTIVE | COMMUNITY
Start: 2021-01-28

## 2021-04-06 RX ORDER — DEXAMETHASONE 4 MG/1
4 TABLET ORAL
Qty: 30 | Refills: 0 | Status: ACTIVE | COMMUNITY
Start: 2020-11-11

## 2021-04-06 RX ORDER — ONDANSETRON 8 MG/1
8 TABLET, ORALLY DISINTEGRATING ORAL
Qty: 18 | Refills: 0 | Status: ACTIVE | COMMUNITY
Start: 2021-04-02

## 2021-04-06 RX ORDER — ATORVASTATIN CALCIUM 10 MG/1
10 TABLET, FILM COATED ORAL
Qty: 90 | Refills: 0 | Status: ACTIVE | COMMUNITY
Start: 2021-02-19

## 2021-04-06 RX ORDER — FERROUS SULFATE TAB 325 MG (65 MG ELEMENTAL FE) 325 (65 FE) MG
325 (65 FE) TAB ORAL
Qty: 30 | Refills: 0 | Status: ACTIVE | COMMUNITY
Start: 2020-11-04

## 2021-06-04 ENCOUNTER — OUTPATIENT (OUTPATIENT)
Dept: OUTPATIENT SERVICES | Facility: HOSPITAL | Age: 61
LOS: 1 days | End: 2021-06-04
Payer: MEDICAID

## 2021-06-04 ENCOUNTER — APPOINTMENT (OUTPATIENT)
Dept: CT IMAGING | Facility: HOSPITAL | Age: 61
End: 2021-06-04
Payer: MEDICAID

## 2021-06-04 DIAGNOSIS — Z98.890 OTHER SPECIFIED POSTPROCEDURAL STATES: Chronic | ICD-10-CM

## 2021-06-04 DIAGNOSIS — D70.1 AGRANULOCYTOSIS SECONDARY TO CANCER CHEMOTHERAPY: ICD-10-CM

## 2021-06-04 DIAGNOSIS — C56.1 MALIGNANT NEOPLASM OF RIGHT OVARY: ICD-10-CM

## 2021-06-04 DIAGNOSIS — C56.2 MALIGNANT NEOPLASM OF LEFT OVARY: ICD-10-CM

## 2021-06-04 PROCEDURE — 74177 CT ABD & PELVIS W/CONTRAST: CPT

## 2021-06-04 PROCEDURE — 74177 CT ABD & PELVIS W/CONTRAST: CPT | Mod: 26

## 2021-08-21 PROBLEM — Z08 ENCOUNTER FOR FOLLOW-UP SURVEILLANCE OF OVARIAN CANCER: Status: ACTIVE | Noted: 2021-08-21

## 2021-08-21 PROBLEM — C79.9 METASTATIC CANCER: Status: ACTIVE | Noted: 2020-12-04

## 2021-08-21 PROBLEM — C56.9 OVARIAN CANCER: Status: ACTIVE | Noted: 2020-12-04

## 2021-08-23 ENCOUNTER — APPOINTMENT (OUTPATIENT)
Dept: GYNECOLOGIC ONCOLOGY | Facility: CLINIC | Age: 61
End: 2021-08-23
Payer: MEDICAID

## 2021-08-23 VITALS
SYSTOLIC BLOOD PRESSURE: 121 MMHG | OXYGEN SATURATION: 98 % | WEIGHT: 144 LBS | BODY MASS INDEX: 28.27 KG/M2 | HEART RATE: 90 BPM | DIASTOLIC BLOOD PRESSURE: 76 MMHG | TEMPERATURE: 97.3 F | HEIGHT: 60 IN

## 2021-08-23 DIAGNOSIS — Z08 ENCOUNTER FOR FOLLOW-UP EXAMINATION AFTER COMPLETED TREATMENT FOR MALIGNANT NEOPLASM: ICD-10-CM

## 2021-08-23 DIAGNOSIS — Z85.43 ENCOUNTER FOR FOLLOW-UP EXAMINATION AFTER COMPLETED TREATMENT FOR MALIGNANT NEOPLASM: ICD-10-CM

## 2021-08-23 DIAGNOSIS — C79.9 SECONDARY MALIGNANT NEOPLASM OF UNSPECIFIED SITE: ICD-10-CM

## 2021-08-23 DIAGNOSIS — C56.9 MALIGNANT NEOPLASM OF UNSPECIFIED OVARY: ICD-10-CM

## 2021-08-23 PROCEDURE — 99214 OFFICE O/P EST MOD 30 MIN: CPT

## 2021-10-24 ENCOUNTER — APPOINTMENT (OUTPATIENT)
Dept: DISASTER EMERGENCY | Facility: CLINIC | Age: 61
End: 2021-10-24

## 2021-10-24 DIAGNOSIS — Z01.818 ENCOUNTER FOR OTHER PREPROCEDURAL EXAMINATION: ICD-10-CM

## 2021-10-25 LAB — SARS-COV-2 N GENE NPH QL NAA+PROBE: NOT DETECTED

## 2021-10-27 ENCOUNTER — APPOINTMENT (OUTPATIENT)
Dept: INTERVENTIONAL RADIOLOGY/VASCULAR | Facility: HOSPITAL | Age: 61
End: 2021-10-27
Payer: MEDICAID

## 2021-10-27 ENCOUNTER — OUTPATIENT (OUTPATIENT)
Dept: OUTPATIENT SERVICES | Facility: HOSPITAL | Age: 61
LOS: 1 days | End: 2021-10-27
Payer: MEDICAID

## 2021-10-27 DIAGNOSIS — D10.1 BENIGN NEOPLASM OF TONGUE: ICD-10-CM

## 2021-10-27 DIAGNOSIS — C56.2 MALIGNANT NEOPLASM OF LEFT OVARY: ICD-10-CM

## 2021-10-27 DIAGNOSIS — C56.1 MALIGNANT NEOPLASM OF RIGHT OVARY: ICD-10-CM

## 2021-10-27 DIAGNOSIS — C22.1 INTRAHEPATIC BILE DUCT CARCINOMA: ICD-10-CM

## 2021-10-27 DIAGNOSIS — Z98.890 OTHER SPECIFIED POSTPROCEDURAL STATES: Chronic | ICD-10-CM

## 2021-10-27 PROCEDURE — 77001 FLUOROGUIDE FOR VEIN DEVICE: CPT

## 2021-10-27 PROCEDURE — C1788: CPT

## 2021-10-27 PROCEDURE — C1769: CPT

## 2021-10-27 PROCEDURE — 36558 INSERT TUNNELED CV CATH: CPT

## 2021-10-27 PROCEDURE — 76937 US GUIDE VASCULAR ACCESS: CPT

## 2021-10-27 PROCEDURE — 77001 FLUOROGUIDE FOR VEIN DEVICE: CPT | Mod: 26

## 2021-10-27 PROCEDURE — 76937 US GUIDE VASCULAR ACCESS: CPT | Mod: 26

## 2021-10-27 RX ORDER — TELMISARTAN 20 MG/1
1 TABLET ORAL
Qty: 0 | Refills: 0 | DISCHARGE

## 2021-12-26 ENCOUNTER — INPATIENT (INPATIENT)
Facility: HOSPITAL | Age: 61
LOS: 7 days | Discharge: ANOTHER HOSPITAL | DRG: 305 | End: 2022-01-03
Attending: INTERNAL MEDICINE | Admitting: INTERNAL MEDICINE
Payer: MEDICAID

## 2021-12-26 VITALS
TEMPERATURE: 98 F | HEART RATE: 112 BPM | OXYGEN SATURATION: 98 % | WEIGHT: 145.06 LBS | RESPIRATION RATE: 18 BRPM | SYSTOLIC BLOOD PRESSURE: 128 MMHG | DIASTOLIC BLOOD PRESSURE: 67 MMHG | HEIGHT: 61 IN

## 2021-12-26 DIAGNOSIS — E03.9 HYPOTHYROIDISM, UNSPECIFIED: ICD-10-CM

## 2021-12-26 DIAGNOSIS — R19.7 DIARRHEA, UNSPECIFIED: ICD-10-CM

## 2021-12-26 DIAGNOSIS — C23 MALIGNANT NEOPLASM OF GALLBLADDER: ICD-10-CM

## 2021-12-26 DIAGNOSIS — Z29.9 ENCOUNTER FOR PROPHYLACTIC MEASURES, UNSPECIFIED: ICD-10-CM

## 2021-12-26 DIAGNOSIS — Z98.890 OTHER SPECIFIED POSTPROCEDURAL STATES: Chronic | ICD-10-CM

## 2021-12-26 DIAGNOSIS — K63.89 OTHER SPECIFIED DISEASES OF INTESTINE: ICD-10-CM

## 2021-12-26 DIAGNOSIS — Z90.710 ACQUIRED ABSENCE OF BOTH CERVIX AND UTERUS: Chronic | ICD-10-CM

## 2021-12-26 DIAGNOSIS — R10.9 UNSPECIFIED ABDOMINAL PAIN: ICD-10-CM

## 2021-12-26 LAB
ALBUMIN SERPL ELPH-MCNC: 3 G/DL — LOW (ref 3.5–5)
ALP SERPL-CCNC: 133 U/L — HIGH (ref 40–120)
ALT FLD-CCNC: 31 U/L DA — SIGNIFICANT CHANGE UP (ref 10–60)
ANION GAP SERPL CALC-SCNC: 9 MMOL/L — SIGNIFICANT CHANGE UP (ref 5–17)
APTT BLD: 29.2 SEC — SIGNIFICANT CHANGE UP (ref 27.5–35.5)
AST SERPL-CCNC: 20 U/L — SIGNIFICANT CHANGE UP (ref 10–40)
BASOPHILS # BLD AUTO: 0.02 K/UL — SIGNIFICANT CHANGE UP (ref 0–0.2)
BASOPHILS NFR BLD AUTO: 0.2 % — SIGNIFICANT CHANGE UP (ref 0–2)
BILIRUB SERPL-MCNC: 0.4 MG/DL — SIGNIFICANT CHANGE UP (ref 0.2–1.2)
BLD GP AB SCN SERPL QL: SIGNIFICANT CHANGE UP
BUN SERPL-MCNC: 13 MG/DL — SIGNIFICANT CHANGE UP (ref 7–18)
CALCIUM SERPL-MCNC: 9.9 MG/DL — SIGNIFICANT CHANGE UP (ref 8.4–10.5)
CHLORIDE SERPL-SCNC: 103 MMOL/L — SIGNIFICANT CHANGE UP (ref 96–108)
CO2 SERPL-SCNC: 26 MMOL/L — SIGNIFICANT CHANGE UP (ref 22–31)
CREAT SERPL-MCNC: 0.74 MG/DL — SIGNIFICANT CHANGE UP (ref 0.5–1.3)
EOSINOPHIL # BLD AUTO: 0.01 K/UL — SIGNIFICANT CHANGE UP (ref 0–0.5)
EOSINOPHIL NFR BLD AUTO: 0.1 % — SIGNIFICANT CHANGE UP (ref 0–6)
GLUCOSE SERPL-MCNC: 136 MG/DL — HIGH (ref 70–99)
HCT VFR BLD CALC: 38 % — SIGNIFICANT CHANGE UP (ref 34.5–45)
HGB BLD-MCNC: 12 G/DL — SIGNIFICANT CHANGE UP (ref 11.5–15.5)
IMM GRANULOCYTES NFR BLD AUTO: 0.4 % — SIGNIFICANT CHANGE UP (ref 0–1.5)
INR BLD: 1.11 RATIO — SIGNIFICANT CHANGE UP (ref 0.88–1.16)
LACTATE SERPL-SCNC: 1 MMOL/L — SIGNIFICANT CHANGE UP (ref 0.7–2)
LIDOCAIN IGE QN: 62 U/L — LOW (ref 73–393)
LYMPHOCYTES # BLD AUTO: 0.71 K/UL — LOW (ref 1–3.3)
LYMPHOCYTES # BLD AUTO: 6.6 % — LOW (ref 13–44)
MAGNESIUM SERPL-MCNC: 2 MG/DL — SIGNIFICANT CHANGE UP (ref 1.6–2.6)
MCHC RBC-ENTMCNC: 27.1 PG — SIGNIFICANT CHANGE UP (ref 27–34)
MCHC RBC-ENTMCNC: 31.6 GM/DL — LOW (ref 32–36)
MCV RBC AUTO: 85.8 FL — SIGNIFICANT CHANGE UP (ref 80–100)
MONOCYTES # BLD AUTO: 0.66 K/UL — SIGNIFICANT CHANGE UP (ref 0–0.9)
MONOCYTES NFR BLD AUTO: 6.1 % — SIGNIFICANT CHANGE UP (ref 2–14)
NEUTROPHILS # BLD AUTO: 9.38 K/UL — HIGH (ref 1.8–7.4)
NEUTROPHILS NFR BLD AUTO: 86.6 % — HIGH (ref 43–77)
NRBC # BLD: 0 /100 WBCS — SIGNIFICANT CHANGE UP (ref 0–0)
PLATELET # BLD AUTO: 157 K/UL — SIGNIFICANT CHANGE UP (ref 150–400)
POTASSIUM SERPL-MCNC: 3.9 MMOL/L — SIGNIFICANT CHANGE UP (ref 3.5–5.3)
POTASSIUM SERPL-SCNC: 3.9 MMOL/L — SIGNIFICANT CHANGE UP (ref 3.5–5.3)
PROT SERPL-MCNC: 7.5 G/DL — SIGNIFICANT CHANGE UP (ref 6–8.3)
PROTHROM AB SERPL-ACNC: 13.1 SEC — SIGNIFICANT CHANGE UP (ref 10.6–13.6)
RBC # BLD: 4.43 M/UL — SIGNIFICANT CHANGE UP (ref 3.8–5.2)
RBC # FLD: 15.5 % — HIGH (ref 10.3–14.5)
SARS-COV-2 RNA SPEC QL NAA+PROBE: SIGNIFICANT CHANGE UP
SODIUM SERPL-SCNC: 138 MMOL/L — SIGNIFICANT CHANGE UP (ref 135–145)
WBC # BLD: 10.82 K/UL — HIGH (ref 3.8–10.5)
WBC # FLD AUTO: 10.82 K/UL — HIGH (ref 3.8–10.5)

## 2021-12-26 PROCEDURE — 74177 CT ABD & PELVIS W/CONTRAST: CPT | Mod: 26,MA

## 2021-12-26 PROCEDURE — 99285 EMERGENCY DEPT VISIT HI MDM: CPT

## 2021-12-26 RX ORDER — LEVOTHYROXINE SODIUM 125 MCG
37.5 TABLET ORAL AT BEDTIME
Refills: 0 | Status: DISCONTINUED | OUTPATIENT
Start: 2021-12-26 | End: 2022-01-03

## 2021-12-26 RX ORDER — ONDANSETRON 8 MG/1
4 TABLET, FILM COATED ORAL EVERY 6 HOURS
Refills: 0 | Status: DISCONTINUED | OUTPATIENT
Start: 2021-12-26 | End: 2022-01-03

## 2021-12-26 RX ORDER — ONDANSETRON 8 MG/1
4 TABLET, FILM COATED ORAL ONCE
Refills: 0 | Status: COMPLETED | OUTPATIENT
Start: 2021-12-26 | End: 2021-12-26

## 2021-12-26 RX ORDER — SODIUM CHLORIDE 9 MG/ML
1000 INJECTION, SOLUTION INTRAVENOUS
Refills: 0 | Status: DISCONTINUED | OUTPATIENT
Start: 2021-12-26 | End: 2021-12-29

## 2021-12-26 RX ORDER — SODIUM CHLORIDE 9 MG/ML
1000 INJECTION INTRAMUSCULAR; INTRAVENOUS; SUBCUTANEOUS ONCE
Refills: 0 | Status: COMPLETED | OUTPATIENT
Start: 2021-12-26 | End: 2021-12-26

## 2021-12-26 RX ORDER — HYDROMORPHONE HYDROCHLORIDE 2 MG/ML
1 INJECTION INTRAMUSCULAR; INTRAVENOUS; SUBCUTANEOUS EVERY 4 HOURS
Refills: 0 | Status: DISCONTINUED | OUTPATIENT
Start: 2021-12-26 | End: 2021-12-31

## 2021-12-26 RX ORDER — PANTOPRAZOLE SODIUM 20 MG/1
40 TABLET, DELAYED RELEASE ORAL DAILY
Refills: 0 | Status: DISCONTINUED | OUTPATIENT
Start: 2021-12-26 | End: 2022-01-03

## 2021-12-26 RX ORDER — HEPARIN SODIUM 5000 [USP'U]/ML
5000 INJECTION INTRAVENOUS; SUBCUTANEOUS EVERY 8 HOURS
Refills: 0 | Status: DISCONTINUED | OUTPATIENT
Start: 2021-12-26 | End: 2022-01-03

## 2021-12-26 RX ORDER — MORPHINE SULFATE 50 MG/1
4 CAPSULE, EXTENDED RELEASE ORAL ONCE
Refills: 0 | Status: DISCONTINUED | OUTPATIENT
Start: 2021-12-26 | End: 2021-12-26

## 2021-12-26 RX ORDER — ENOXAPARIN SODIUM 100 MG/ML
40 INJECTION SUBCUTANEOUS DAILY
Refills: 0 | Status: DISCONTINUED | OUTPATIENT
Start: 2021-12-26 | End: 2021-12-26

## 2021-12-26 RX ORDER — SODIUM CHLORIDE 9 MG/ML
1000 INJECTION, SOLUTION INTRAVENOUS
Refills: 0 | Status: DISCONTINUED | OUTPATIENT
Start: 2021-12-26 | End: 2021-12-26

## 2021-12-26 RX ADMIN — SODIUM CHLORIDE 1000 MILLILITER(S): 9 INJECTION INTRAMUSCULAR; INTRAVENOUS; SUBCUTANEOUS at 09:19

## 2021-12-26 RX ADMIN — ONDANSETRON 4 MILLIGRAM(S): 8 TABLET, FILM COATED ORAL at 10:02

## 2021-12-26 RX ADMIN — MORPHINE SULFATE 4 MILLIGRAM(S): 50 CAPSULE, EXTENDED RELEASE ORAL at 10:33

## 2021-12-26 RX ADMIN — SODIUM CHLORIDE 1000 MILLILITER(S): 9 INJECTION INTRAMUSCULAR; INTRAVENOUS; SUBCUTANEOUS at 10:19

## 2021-12-26 RX ADMIN — MORPHINE SULFATE 4 MILLIGRAM(S): 50 CAPSULE, EXTENDED RELEASE ORAL at 10:03

## 2021-12-26 NOTE — ED PROVIDER NOTE - OBJECTIVE STATEMENT
62 y/o F with a significant PMHx of C. diff in the past presents to the ED with 6 days of abdominal pain, vomiting and diarrhea. Patient reports 4-6 episodes a day, nonbloody. Patient states she received a booster 6 days ago with symptoms starting shortly afterwards. Denies fever, cough or any other acute complaints. Patient also on chemo for gallbladder cancer, oncologist Dr. Kyle.

## 2021-12-26 NOTE — ED PROVIDER NOTE - CLINICAL SUMMARY MEDICAL DECISION MAKING FREE TEXT BOX
62 y/o F, on chemo, presents with vomiting, diarrhea and abdominal pain. Will do labs, CT scan, pain control and reassess.

## 2021-12-26 NOTE — ED ADULT NURSE NOTE - OBJECTIVE STATEMENT
pt presents to ed for diarrhea x2 intermediately after her last round of chemo. pt daughter at bedside also reports vomiting for 1week since the covid vaccine booster

## 2021-12-26 NOTE — H&P ADULT - ATTENDING COMMENTS
60 y/o F with Hx of gall bladder adenocarcinoma, Hypothyroidism presents to the ED for diarrhea and vomiting. Patient is currently undergoing chemotherapy every 2 weeks with Dr. Kyle. Last chemo 12/13/21. Patient's daughter states pt has been having dull abdominal pain for the past 2 weeks. The pain is intermittent. She started feeling worse around raissa and family took her to Dr. Kyle, all blood tests were normal and biomarkers improving. On raissa ignacio she started having diarrhea 2-3 times a day with vomiting NBNB. The vomiting increased in frequency last night. It was just yellowish green in colour with no food particles. She has been able to keep food down. He daughter stated she she gave her mum 2 pieces of bread with 2 egg whites this morning and rice and soup this afternoon which she was able to keep down. Pt has a hx of c.diff earlier this year. Denies chest pain, fever, chills, cough, constipation, palpitations, dizziness.       assessment  --  abd pain likely 2nd to cecal mass, partial sbo, diarrhea, r/o c diff, h/o gall bladder adenocarcinoma, Hypothyroidism    plan  --  admit to med, npo, rocephin, flagyl, cont preadmit home meds, gi and dvt prophylaxis, ivf  cbc, bmp, mg, phos, lipids, tsh, bld cx, ua, ucx, stool studies in cluding c diff      heme onc cons  surg cons

## 2021-12-26 NOTE — CONSULT NOTE ADULT - ASSESSMENT
60 y/o F with a significant PMHx of hypothyroidism, HTN, HLD, metastatic Ovarian CA(to liver, lung) s/p ALYSSA-BSO, debulking of neoplasm, open appendectomy, cholecystectomy and omentectomy done at [facility] with chriss adj chemotherapy , newly diagnosed gallbladder ca (metastatic or local ?) presently on adjuvant chemo , C. diff in the past presents to the ED with 6 days of abdominal pain, diarrhea. and + 4-6 episodes of NBNB vomiting , loose stools and change of stool caliber.   CT shows dilated small bowels with a large necrotic cecal tumor     Can  transfer Pt to [facility] where she is well known by surgery and gyn onc if possible  Pain/nausea control . NGT placement prn  Palliative consult   Gyn consult  f/up labs inc tumor markers and lactate     Heme/Onc consult with Dr Kyle  Other care /mgmt per primary team   Surgical oncology will follow  62 y/o F with a significant PMHx of hypothyroidism, HTN, HLD, metastatic Ovarian CA(to liver, lung) s/p ALYSSA-BSO, debulking of neoplasm, open appendectomy, cholecystectomy and omentectomy done at [facility] with chriss adj chemotherapy , newly diagnosed gallbladder ca (metastatic or local ?) presently on adjuvant chemo , C. diff in the past presents to the ED with 6 days of abdominal pain, diarrhea. and + 4-6 episodes of NBNB vomiting , loose stools and change of stool caliber. CT shows dilated small bowels with a large necrotic cecal tumor     Transfer Pt to [facility] where she is well known by surgery and gyn onc if possible-Dr Anna @[facility]  Pain/nausea control . NGT placement prn  Palliative consult   Gyn consult  f/up labs inc tumor markers and lactate     Heme/Onc consult with Dr Kyle  Other care /mgmt per primary team   Consult Surgical oncology prn

## 2021-12-26 NOTE — H&P ADULT - HISTORY OF PRESENT ILLNESS
60 y/o F with Hx of gall bladder adenocarcinoma, Hypothyroidism presents to the ED for diarrhea and vomiting. Patient is currently undergoing chemotherapy every 2 weeks with Dr. Kyle. Last chemo 12/13/21. Patient's daughter states pt has been having dull abdominal pain for the past 2 weeks. The pain is intermittent. She started feeling worse around raissa and family took her to Dr. Kyle, all blood tests were normal and biomarkers improving. On raissa ignacio she started having diarrhea 2-3 times a day with vomiting NBNB. The vomiting increased in frequency last night. It was just yellowish green in colour with no food particles. She has been able to keep food down. He daughter stated she she gave her mum 2 pieces of bread with 2 egg whites this morning and rice and soup this afternoon which she was able to keep down. Pt has a hx of c.diff earlier this year. Denies chest pain, fever, chills, cough, constipation, palpitations, dizziness.

## 2021-12-26 NOTE — ED PROVIDER NOTE - CONSTITUTIONAL, MLM
Mom came to bedside towards end of shift, updated on plan of care by RN. Participates in cares independently.   Infant in crib with stable temps. On RA with no A/B episodes noted. Gtube clamped. R IJ infusing tpn. On q3 hr feeds of EBM 20kcal, increased to 33mls, bottled offered q3hr. Completed all feeds with Dr patricia Jacobs Preemmatt, pacing required at times, 1 choking episode so far. Will be put to empty breast at 1700. Tolerating feeds without spits or emesis. Voiding and stooling- light yellow creamy or bright loose stools noted. No other changes at this time. Will cont to monitor.    Well appearing, awake, alert, oriented to person, place, time/situation and in no apparent distress. normal...

## 2021-12-26 NOTE — H&P ADULT - PROBLEM SELECTOR PLAN 5
IMPROVE VTE Individual Risk Assessment  RISK                                                                Points  [  ] Previous VTE                                                  3  [  ] Thrombophilia                                               2  [  ] Lower limb paralysis                                      2        (unable to hold up >15 seconds)    [  ] Current Cancer                                              2         (within 6 months)  [x  ] Immobilization > 24 hrs                                1  [  ] ICU/CCU stay > 24 hours                              1  [x  ] Age > 60                                                      1  IMPROVE VTE Score _________2, -- for DVT proph  heparin sq

## 2021-12-26 NOTE — ED PROVIDER NOTE - PROGRESS NOTE DETAILS
CT revals colonic mass and SBO. surgery consulted. recommend admission to medicine and conservative management at this point. Dr youssef called, aware of plan.

## 2021-12-26 NOTE — H&P ADULT - PROBLEM SELECTOR PLAN 1
presented with dull abdominal pain, vomiting and diarrhea   c.diff vs sbo  CT: Large necrotic cecal tumor with suspected synchronous involvement of the ascending colon and resultant diffuse small bowel obstruction. Thickening and enhancement of the terminal ileum is nonspecific and may reflect local spread versus bowel ischemia. Lactate level correlation is recommended. Necrotic lymphadenopathy.    Hypodense right hepatic lobe lesion concerning for metastasis    Will start on IV fluids   Follow C.diff  Zofran prn   Surgery consulted   Needs Surgical oncology consult in am  Heme/onc Dr. Kyle following

## 2021-12-26 NOTE — ED ADULT NURSE NOTE - NSIMPLEMENTINTERV_GEN_ALL_ED
Implemented All Fall with Harm Risk Interventions:  [place] to call system. Call bell, personal items and telephone within reach. Instruct patient to call for assistance. Room bathroom lighting operational. Non-slip footwear when patient is off stretcher. Physically safe environment: no spills, clutter or unnecessary equipment. Stretcher in lowest position, wheels locked, appropriate side rails in place. Provide visual cue, wrist band, yellow gown, etc. Monitor gait and stability. Monitor for mental status changes and reorient to person, place, and time. Review medications for side effects contributing to fall risk. Reinforce activity limits and safety measures with patient and family. Provide visual clues: red socks.

## 2021-12-26 NOTE — H&P ADULT - NSHPPHYSICALEXAM_GEN_ALL_CORE
LOS:     VITALS:   T(C): 37.3 (12-26-21 @ 15:20), Max: 37.3 (12-26-21 @ 15:20)  HR: 99 (12-26-21 @ 15:20) (99 - 112)  BP: 112/63 (12-26-21 @ 15:20) (112/63 - 128/67)  RR: 18 (12-26-21 @ 15:20) (18 - 18)  SpO2: 98% (12-26-21 @ 15:20) (98% - 98%)    GENERAL: NAD, R chemo port   HEAD:  Atraumatic, Normocephalic  EYES: EOMI, PERRLA, conjunctiva and sclera clear  ENT: Moist mucous membranes  NECK: Supple, No JVD  CHEST/LUNG: Clear to auscultation bilaterally; No rales, rhonchi, wheezing, or rubs. Unlabored respirations  HEART: Regular rate and rhythm; No murmurs, rubs, or gallops  ABDOMEN: midline surgical scar, BSx4; Soft, nontender, nondistended  EXTREMITIES:  2+ Peripheral Pulses, brisk capillary refill. No clubbing, cyanosis, or edema  NERVOUS SYSTEM:  A&Ox3, no focal deficits   SKIN: No rashes or lesions

## 2021-12-26 NOTE — H&P ADULT - ASSESSMENT
60 y/o F with Hx of gall bladder adenocarcinoma, Hypothyroidism presents to the ED for diarrhea and vomiting. Admitted for cecal mass and SBO

## 2021-12-26 NOTE — ED PROVIDER NOTE - SECONDARY DIAGNOSIS.
[facility]  [address] [facility]. Nicolás Alvarenga, 1 [facility]                                                    Physical Therapy  Daily Note    Patient Name: Lowell Dangelo  Date:2020    : 2016  [x]  Patient  Verified  Payor: Merline Grow / Plan: [address] [place] [address] / Product Type: PPO /    In time: 1540  Out time:1640  Total Treatment Time (min): 60  Total Timed Codes (min): 60    Treatment Area: Muscle weakness [M62.81]  Unspecified lack of coordination [R27.9]  Unspecified abnormalities of gait and mobility [R26.9]    Visit Type:  [] Intensive  [x] Outpatient  []  Orthotic Clinic Visit  []  Equipment Clinic Visit  [x] Virtual Visit    Lowell Dangelo was informed of the inherent limitations of a virtual visit,  and has consented to a virtual therapy visit on 2020. Information regarding emergency contact information for this patient during this visit is to contact:  Ronald Nieto in addition to calling 911. The patient was informed that at any time during the virtual visit, they can decide to stop the virtual visit. The patient verified that they are physically located in the [facility] for this virtual visit. SUBJECTIVE  Pain Level (0-10 scale): FLACC score: Pain: FLACC scale    Start of Session  During Session End of Session    Face  0 0 0   Legs  0 0 0   Activity  0 0 0   Cry  0 0 0   Consolability  0 0-1 0-1   Total  0 0-1 0-1        Any medication changes, allergies to medications, adverse drug reactions, diagnosis change, or new procedure performed?: [x] No    [] Yes (see summary sheet for update)  Subjective functional status/changes:   [] No changes reported  Patient worked with his mother at home during a virtual visit. His mother gave consent for the virtual visit. His aide, DANIEL ROBBINS [employer], was also present for the session. Discussed Francisco J's ENT appts. Tube has fallen out of the L ear. Infection present. Given medicine and drops. Did a hear testing and everything was fine. Will do a VEMP test at some point. Need to be really still for a VEMP so doctor not totally sure that Francisco J would be successful in it. He is constantly seeking the plough position. Discussed other ways to give him input for that like a weighted hat or neck pillow, embrace squeezes, vestibular input. Francisco J missed his last 3 visits due to still being off from a potential ear infection (which it was) and because of his ENT appt.       OBJECTIVE    Modality rationale: decrease pain, increase tissue extensibility and/or increase muscle contraction/control to improve the patients ability to achieve their functional goals   Type Additional Details   [] Estim: []Att    []TENS  []NMES     []IFC  []Premod  []Other:  []  Concurrent with other treatment  []w/ice   []w/heat  Position:  Location:   []  Ice     []  heat  []  Ice massage  []  Concurrent with other treatment Position:  Location:   [] Skin assessment post-treatment:  []intact []redness- no adverse reaction    []redness  adverse reaction:        min AT Assessment:  [x] See flow sheet:   Rationale: to assess AT needs of the patient for proper fit and positiong to improve the patients ability to achieve their functional goals         min Therapeutic Exercise:  [x] See flow sheet:   Rationale: increase ROM, increase strength, improve coordination, improve balance and increase proprioception to improve the patients ability to achieve their functional goals       45 min Neuromuscular Re-education:  []  See flow sheet    Rationale: Improve muscle re-education of movement, balance, coordination, kinesthetic sense, posture, and proprioception to improve the patient's ability to achieve their functional goals     min Manual Therapy:  See flowsheet   Rationale: decrease pain, increase ROM, increase tissue extensibility, decrease trigger points and increase postural awareness to work towards their functional goals     15 min Gait Training:  See flow sheet        min Therapeutic Activities: See Flowsheet   Rationale: to use dynamic activity to improve functional performance and transfers          With   [] TE   [] neuro   [x] other: after session Patient Education: [x] Review HEP    [] Progressed/Changed HEP based on:   [] positioning   [] body mechanics   [] transfers   [] heat/ice application  [x]  Reviewed session with caregiver afterwards    [] other:         Objective/Functional Measures:    Vestibular ---   Rhythmic Movements / Reflex Integration ---   Bisi ---   Universal Exercise Unit (UEU) ---   Core ---   Tall kneel / Half Kneel ---   Quaduped / Maranda Bue ---   Transitions - sit to stand from bench with 1-2 light touch at finger with improved forward weight shift over his legs with kiddie gaits on   Stairs ---   Standing - with back to the wall with kiddie gaits and SMOs on just standing and also reaching forward and down for a bar or mom's hands in front - came off of the wall for about 1 second and then lean back to wall on his own x 2  - stand facing a step and reach down to a step one or two above him for an object on that step with kiddie gaits on with close guarding x 4-5 times    Gait/pre-gait activities - with 2 HHA to start and then Francisco J wanted to let go of mom's hands so held the back of his shirt for about 15' x 1  - walk with R HHA about 10' x 1 and L HHA about 10' x 1 with kiddie gaits on  - with 2 HHA from behind and from the front about 15' x 1  - take 3-4 steps with hands on a bar in front or mom's hands x 3   FES ---   Other - donned SMOs and then Kiddie gaits with guidance on how to put the kiddie gaits on            Pain Level (0-10 scale) post treatment:    FLACC score: see chart above    ASSESSMENT/Changes in Function: Francisco J participated in a 60 minute telehealth virtual physical therapy session. Francisco J became fussy as the session went along.  At the start of the session he demonstrated increased positioning in the plough position appearing to seek input through his head and neck. Discussed ways, as mentioned above, to help him get input through other ways. Francisco J is walking better than when seen at his last visit. He stayed up on his feet longer and took steps more consistently. With support behind him he did at times lean back causing more of a toe heel gait pattern. With support given at his shirt only or from the front her used more of a flat foot landing and a more high steppage gait. He used more of a high steppage gait as well with kiddie gaits on. With the kiddie gaits he did stay more forward over his feet during gait. He also stood with his back to the wall with his weight more forward and he reached down and forward to support with increased speed and improved head staying forward and down toward what he was reaching for. Also, she was more willing to reach foward and down to get an object on a step below him from standing with the kiddie gaits on. Asked his mother to use the kiddie gaits 10-15 min each day until our next visit to continue working on his ability to use them and comfort in them. Con't with POC. Patient will continue to benefit from skilled PT services to modify and progress therapeutic interventions, address functional mobility deficits, address ROM deficits, address strength deficits, analyze and address soft tissue restrictions, analyze and cue movement patterns, analyze and modify body mechanics/ergonomics, assess and modify postural abnormalities and instruct in home and community integration to attain remaining goals.      [x]  See Plan of Care  []  See progress note/recertification  []  See Discharge Summary         Progress towards goals / Updated goals: [x]  Not assessed on this visit      Short term goals: to be reassessed and revised as necessary:  Patient will: Status: TFA:   Take 1-2 steps with close guarding only between support to work toward independent walking 2/3 times PM:  From back against wall with holding onto support in front of him 2/3/20-7/3/20   Stand at support and reach down to the ground for a toy and return to standing with close guarding only 2/3 times during play. PM- less hesitant and keeping his head more forward down to the object- went even lower with kiddie gaits on 2/3/20-7/3/20   Walk with 1 HHA for 80'-100' without LOB 2/3 times for improved balance, form, and safety with gait  Progressing- had a set back appearing to have less balance and confidence than previously. He ended up having an ear infection that may have impacted his balance 3/6/20-6/6/20   Transition from floor to stand using a support surface through half kneel with supervision only as seen in 2/3 trials in order to more independently explore his environment.  PM - using R leg more consistently at support, but can also roll over his feet some. Will continue to assess for consistency 4/4/19 to 8/30/20   Transition from standing at a support surface to sitting on the ground through half kneeling with CGA as seen in 2/3 trials in order to demonstrate improved safety when transitioning in his environment.  PM- with his back to the wall leaning down toward an object about waist height with close guarding  4/4/19 to 8/30/20   Stand without support with close guard for 15 seconds as seen in 2/3 trials in order to assist with activities of daily living and transitions. Progressing - varies currently 4/4/19 to 8/20/20   Ascend 4 steps using 1 handrail with close guard only as seen in 2/3 trials in order to improve independence with negotiating his home environment. Progressing- with one hand on the railing and mom behind him with CGA-min A- mom helped him bring his leg up to help him time the stepping better and he did lean forward on his own as he brought himself up. 4/4/19 to 7/31/20    Cruise B directions of mat table and let go with 1 hand to reach for 2nd support surface, CGA only in 2/3 trials.   Progressing- hesitant to let go to reach for another support  11/11/19-7/11/20             Met/Discharged Goals     Climb up onto a mat table with close guarding-LT to get to a toy 2/3 times. met 2/3/20-3/6/20   Amb with 1 HHA x 30 feet met 1/7/20-3/6/20   Crawl up 4 steps with close guarding-LT for increased independence with moving about his environment. met 2/3/20-3/6/20   Transition from sitting in a chair to turn to lower down to the floor with CGA, as seen in 2/3 trials in order to improve ability to functionally transition throughout his environment. Met for CGA and can do with close guarding-LT 8/26/19-3 /6/20         Ambulate 15 feet in his Crocodile with supervision only maintaining an upright trunk when advancing the Crocodile as seen in 2/3 trials in order to improve household mobility.  Met 4/4/19 to 5/4/19      Ambulate 15 feet in his Crocodile with CGA for stabilization of the walker with front wheels swiveled with his trunk and LEs in alignment with additional assistance for steering and obstacle negotiation as seen in 2 out of 3 trials for improved household negotiation. Met. 5/20/19-8/30/19      Ambulate x 30 feet with his Crocodile gait  with his trunk upright, LEs positioned underneath his pelvis, and consistently advancing gait  with assistance only for steering as seen in 2 out of 3 trials for improved household mobility. Met 5/20/19-8/30/19      Transition from the floor to climb onto and sit on a small chair with CGA, as seen in 2/3 trials in order to improve ability to functionally transition throughout his environment. GOAL MET- able to climb onto a bench with CGA; mom reports he climbs onto the couch at home.  8/26/19- 9/13/19         Long term goal: TFA:10/4/19-10/4/20  Francisco J will demonstrate improved total body strength, balance, ability to perform transitions, improved gait, and sustained activity tolerance in order to maximize his safety and independence with all functional mobility in his home and community environments. Partially Met          PLAN  [x]  Upgrade activities as tolerated     [x]  Continue plan of care  []  Update interventions per flow sheet       []  Discharge due to:_  []  Other:_      Kendra Junior is a 9 y.o. male being evaluated by a Virtual Visit (video visit) encounter to address concerns as mentioned above. A caregiver was present when appropriate. Due to this being a TeleHealth encounter (During [facility] emergency), evaluation of the following areas was limited: Direct tissue palpation, direct goniometric measurements, blood pressure, O2 saturation. Pursuant to the emergency declaration under the [address] [place] [address], [address] [place] [address] authority and the [employer] Act, this Virtual Visit was conducted with patient's (and/or legal guardian's) consent, to reduce the risk of exposure to COVID-19 and provide necessary medical care. Services were provided through a video synchronous discussion virtually to substitute for in-person encounter. --Matthew Maldonado PT on 5/21/2020 at 7:00 PM    An electronic signature was used to authenticate this note.     Matthew Maldonado PT 5/21/2020  7:00 PM SBO (small bowel obstruction)

## 2021-12-27 DIAGNOSIS — K63.89 OTHER SPECIFIED DISEASES OF INTESTINE: ICD-10-CM

## 2021-12-27 DIAGNOSIS — Z02.9 ENCOUNTER FOR ADMINISTRATIVE EXAMINATIONS, UNSPECIFIED: ICD-10-CM

## 2021-12-27 LAB
ALBUMIN SERPL ELPH-MCNC: 2.5 G/DL — LOW (ref 3.5–5)
ALP SERPL-CCNC: 106 U/L — SIGNIFICANT CHANGE UP (ref 40–120)
ALT FLD-CCNC: 30 U/L DA — SIGNIFICANT CHANGE UP (ref 10–60)
ANION GAP SERPL CALC-SCNC: 5 MMOL/L — SIGNIFICANT CHANGE UP (ref 5–17)
APPEARANCE UR: CLEAR — SIGNIFICANT CHANGE UP
AST SERPL-CCNC: 18 U/L — SIGNIFICANT CHANGE UP (ref 10–40)
BASOPHILS # BLD AUTO: 0.02 K/UL — SIGNIFICANT CHANGE UP (ref 0–0.2)
BASOPHILS NFR BLD AUTO: 0.3 % — SIGNIFICANT CHANGE UP (ref 0–2)
BILIRUB SERPL-MCNC: 0.4 MG/DL — SIGNIFICANT CHANGE UP (ref 0.2–1.2)
BILIRUB UR-MCNC: ABNORMAL
BUN SERPL-MCNC: 18 MG/DL — SIGNIFICANT CHANGE UP (ref 7–18)
CALCIUM SERPL-MCNC: 8.4 MG/DL — SIGNIFICANT CHANGE UP (ref 8.4–10.5)
CANCER AG19-9 SERPL-ACNC: 11 U/ML — SIGNIFICANT CHANGE UP
CEA SERPL-MCNC: 3.9 NG/ML — HIGH (ref 0–3.8)
CHLORIDE SERPL-SCNC: 109 MMOL/L — HIGH (ref 96–108)
CO2 SERPL-SCNC: 28 MMOL/L — SIGNIFICANT CHANGE UP (ref 22–31)
COLOR SPEC: YELLOW — SIGNIFICANT CHANGE UP
CREAT SERPL-MCNC: 0.57 MG/DL — SIGNIFICANT CHANGE UP (ref 0.5–1.3)
DIFF PNL FLD: NEGATIVE — SIGNIFICANT CHANGE UP
EOSINOPHIL # BLD AUTO: 0.02 K/UL — SIGNIFICANT CHANGE UP (ref 0–0.5)
EOSINOPHIL NFR BLD AUTO: 0.3 % — SIGNIFICANT CHANGE UP (ref 0–6)
GLUCOSE SERPL-MCNC: 99 MG/DL — SIGNIFICANT CHANGE UP (ref 70–99)
GLUCOSE UR QL: NEGATIVE — SIGNIFICANT CHANGE UP
HCT VFR BLD CALC: 30.9 % — LOW (ref 34.5–45)
HGB BLD-MCNC: 9.8 G/DL — LOW (ref 11.5–15.5)
IMM GRANULOCYTES NFR BLD AUTO: 0.4 % — SIGNIFICANT CHANGE UP (ref 0–1.5)
KETONES UR-MCNC: ABNORMAL
LEUKOCYTE ESTERASE UR-ACNC: ABNORMAL
LYMPHOCYTES # BLD AUTO: 1.55 K/UL — SIGNIFICANT CHANGE UP (ref 1–3.3)
LYMPHOCYTES # BLD AUTO: 19.5 % — SIGNIFICANT CHANGE UP (ref 13–44)
MAGNESIUM SERPL-MCNC: 2.2 MG/DL — SIGNIFICANT CHANGE UP (ref 1.6–2.6)
MCHC RBC-ENTMCNC: 27.5 PG — SIGNIFICANT CHANGE UP (ref 27–34)
MCHC RBC-ENTMCNC: 31.7 GM/DL — LOW (ref 32–36)
MCV RBC AUTO: 86.6 FL — SIGNIFICANT CHANGE UP (ref 80–100)
MONOCYTES # BLD AUTO: 0.94 K/UL — HIGH (ref 0–0.9)
MONOCYTES NFR BLD AUTO: 11.8 % — SIGNIFICANT CHANGE UP (ref 2–14)
NEUTROPHILS # BLD AUTO: 5.39 K/UL — SIGNIFICANT CHANGE UP (ref 1.8–7.4)
NEUTROPHILS NFR BLD AUTO: 67.7 % — SIGNIFICANT CHANGE UP (ref 43–77)
NITRITE UR-MCNC: NEGATIVE — SIGNIFICANT CHANGE UP
NRBC # BLD: 0 /100 WBCS — SIGNIFICANT CHANGE UP (ref 0–0)
PH UR: 6 — SIGNIFICANT CHANGE UP (ref 5–8)
PHOSPHATE SERPL-MCNC: 4.1 MG/DL — SIGNIFICANT CHANGE UP (ref 2.5–4.5)
PLATELET # BLD AUTO: 187 K/UL — SIGNIFICANT CHANGE UP (ref 150–400)
POTASSIUM SERPL-MCNC: 3.9 MMOL/L — SIGNIFICANT CHANGE UP (ref 3.5–5.3)
POTASSIUM SERPL-SCNC: 3.9 MMOL/L — SIGNIFICANT CHANGE UP (ref 3.5–5.3)
PROT SERPL-MCNC: 6.1 G/DL — SIGNIFICANT CHANGE UP (ref 6–8.3)
PROT UR-MCNC: 30 MG/DL
RBC # BLD: 3.57 M/UL — LOW (ref 3.8–5.2)
RBC # FLD: 15.8 % — HIGH (ref 10.3–14.5)
SODIUM SERPL-SCNC: 142 MMOL/L — SIGNIFICANT CHANGE UP (ref 135–145)
SP GR SPEC: 1.02 — SIGNIFICANT CHANGE UP (ref 1.01–1.02)
UROBILINOGEN FLD QL: 1
WBC # BLD: 7.95 K/UL — SIGNIFICANT CHANGE UP (ref 3.8–10.5)
WBC # FLD AUTO: 7.95 K/UL — SIGNIFICANT CHANGE UP (ref 3.8–10.5)

## 2021-12-27 RX ADMIN — Medication 37.5 MICROGRAM(S): at 04:50

## 2021-12-27 RX ADMIN — PANTOPRAZOLE SODIUM 40 MILLIGRAM(S): 20 TABLET, DELAYED RELEASE ORAL at 13:15

## 2021-12-27 RX ADMIN — SODIUM CHLORIDE 100 MILLILITER(S): 9 INJECTION, SOLUTION INTRAVENOUS at 08:32

## 2021-12-27 RX ADMIN — HEPARIN SODIUM 5000 UNIT(S): 5000 INJECTION INTRAVENOUS; SUBCUTANEOUS at 06:25

## 2021-12-27 RX ADMIN — HEPARIN SODIUM 5000 UNIT(S): 5000 INJECTION INTRAVENOUS; SUBCUTANEOUS at 23:25

## 2021-12-27 RX ADMIN — SODIUM CHLORIDE 100 MILLILITER(S): 9 INJECTION, SOLUTION INTRAVENOUS at 23:25

## 2021-12-27 RX ADMIN — Medication 37.5 MICROGRAM(S): at 23:26

## 2021-12-27 RX ADMIN — HEPARIN SODIUM 5000 UNIT(S): 5000 INJECTION INTRAVENOUS; SUBCUTANEOUS at 16:51

## 2021-12-27 NOTE — ED ADULT NURSE REASSESSMENT NOTE - NS ED NURSE REASSESS COMMENT FT1
Patient remains AxOx4 ambulatory with steady gait, no pain, vomiting or diarrhea noted, VS WNL. Rt chest  assessed port  with + blood return noted, flushed with 10cc N/S. IVF in progress. Patient will transferred to [facility]  awaiting for transportation.

## 2021-12-27 NOTE — PROGRESS NOTE ADULT - SUBJECTIVE AND OBJECTIVE BOX
Patient is a 61y old  Female who presents with a chief complaint of vomiting and diarrhea (27 Dec 2021 10:55)    OVERNIGHT EVENTS: no acute changes, sitting up resting comfortably.     REVIEW OF SYSTEMS: Tibetan  used #[id number]  CONSTITUTIONAL: No fever, chills  ENMT:  No difficulty hearing, no change in vision  NECK: No pain or stiffness  RESPIRATORY: No cough, SOB  CARDIOVASCULAR: No chest pain, palpitations  GASTROINTESTINAL: +diarrhea. No abdominal pain. No nausea, vomiting.  GENITOURINARY: No dysuria  NEUROLOGICAL: No HA  SKIN: No itching, burning, rashes, or lesions   MUSCULOSKELETAL: No joint pain or swelling; No muscle, back, or extremity pain    T(C): 36.8 (12-27-21 @ 16:40), Max: 37.1 (12-26-21 @ 19:56)  HR: 92 (12-27-21 @ 16:40) (92 - 98)  BP: 101/62 (12-27-21 @ 16:40) (101/62 - 116/61)  RR: 18 (12-27-21 @ 16:40) (18 - 19)  SpO2: 95% (12-27-21 @ 16:40) (95% - 97%)  Wt(kg): --Vital Signs Last 24 Hrs  T(C): 36.8 (27 Dec 2021 16:40), Max: 37.1 (26 Dec 2021 19:56)  T(F): 98.2 (27 Dec 2021 16:40), Max: 98.7 (26 Dec 2021 19:56)  HR: 92 (27 Dec 2021 16:40) (92 - 98)  BP: 101/62 (27 Dec 2021 16:40) (101/62 - 116/61)  BP(mean): --  RR: 18 (27 Dec 2021 16:40) (18 - 19)  SpO2: 95% (27 Dec 2021 16:40) (95% - 97%)    MEDICATIONS  (STANDING):  dextrose 5% + sodium chloride 0.45%. 1000 milliLiter(s) (100 mL/Hr) IV Continuous <Continuous>  heparin   Injectable 5000 Unit(s) SubCutaneous every 8 hours  levothyroxine Injectable 37.5 MICROGram(s) IV Push at bedtime  pantoprazole  Injectable 40 milliGRAM(s) IV Push daily    MEDICATIONS  (PRN):  HYDROmorphone  Injectable 1 milliGRAM(s) IV Push every 4 hours PRN Moderate Pain (4 - 6)  ondansetron Injectable 4 milliGRAM(s) IV Push every 6 hours PRN Nausea    PHYSICAL EXAM:  GENERAL: well-appearing, NAD  EYES: clear conjunctiva  ENMT: +dry mucous membranes  NECK: Supple, No JVD  CHEST/LUNG: +right chest port accessed. Clear to auscultation bilaterally; No rales, rhonchi, wheezing, or rubs  HEART: S1, S2, Regular rate and rhythm  ABDOMEN: +surgical scar. Soft, Nontender, Nondistended; Bowel sounds present  NEURO: Alert & Oriented X3  EXTREMITIES: No LE edema, no calf tenderness  SKIN: No rashes or lesions    Consultant(s) Notes Reviewed:  [x ] YES  [ ] NO  Care Discussed with Consultants/Other Providers [ x] YES  [ ] NO    LABS:                        9.8    7.95  )-----------( 187      ( 27 Dec 2021 05:28 )             30.9     12-27    142  |  109<H>  |  18  ----------------------------<  99  3.9   |  28  |  0.57    Ca    8.4      27 Dec 2021 05:28  Phos  4.1     12-27  Mg     2.2     12-27    TPro  6.1  /  Alb  2.5<L>  /  TBili  0.4  /  DBili  x   /  AST  18  /  ALT  30  /  AlkPhos  106  12-27    PT/INR - ( 26 Dec 2021 19:12 )   PT: 13.1 sec;   INR: 1.11 ratio         PTT - ( 26 Dec 2021 19:12 )  PTT:29.2 sec  CAPILLARY BLOOD GLUCOSE    RADIOLOGY & ADDITIONAL TESTS:  < from: CT Abdomen and Pelvis w/ IV Cont (12.26.21 @ 10:36) >  ACC: [id number] EXAM:  CT ABDOMEN AND PELVIS IC                          PROCEDURE DATE:  12/26/2021          INTERPRETATION:  CLINICAL INFORMATION: Diffuse abdominal pain, vomiting   and diarrhea. History of gallbladder cancer. On chemotherapy.    COMPARISON: CT abdomen pelvis 6/4/2021    CONTRAST/COMPLICATIONS:  IV Contrast: Omnipaque 350  90 cc administered   10 cc discarded  Oral Contrast: NONE  Complications: None reported at time of study completion    PROCEDURE:  CT of the Abdomen and Pelvis was performed.  Sagittal and coronal reformats were performed.    FINDINGS:  LOWER CHEST: Central venous catheter tip in the right atrium.    LIVER: A new 8 mm inferior right hepatic lobe hypodense lesion..  BILE DUCTS: Normal caliber.  GALLBLADDER: Cholecystectomy.  SPLEEN: Within normal limits.  PANCREAS: Within normal limits.  ADRENALS: Within normal limits.  KIDNEYS/URETERS: Within normal limits.    BLADDER: Minimally distended.  REPRODUCTIVE ORGANS: Hysterectomy.    BOWEL: Large necrotic cecal mass with heterogeneous masslike thickening   of the ascending colon to the level of the hepatic flexure. The cecal   mass obstructs the ileocecal valve resulting in diffuse small bowel   obstruction. A very small segment of ileocolic intussusception is   suggested (4:47 and 2:69). Thickening and hyperemia of the terminal ileum.  PERITONEUM: Trace ascites.  VESSELS: Within normal limits.  RETROPERITONEUM/LYMPH NODES: Right-sided pericolonic lymphadenopathy.   Necrotic lymph nodes along the SMA measure 29 x 19 mm and 18 x 18 mm.  ABDOMINAL WALL: Within normal limits.  BONES: Degenerative changes.    IMPRESSION:  Large necrotic cecal tumor with suspected synchronous involvement of the   ascending colon and resultant diffuse small bowel obstruction. Thickening   and enhancement of the terminal ileum is nonspecific and may reflect   local spread versus bowel ischemia. Lactate level correlation is   recommended. Necrotic lymphadenopathy.    Hypodense right hepatic lobe lesion concerning for metastasis.    --- End of Report ---            VILMA CHAN MD; Attending Radiologist  This document has been electronically signed. Dec 26 2021 11:25AM    < end of copied text >

## 2021-12-27 NOTE — PROGRESS NOTE ADULT - ASSESSMENT
60 y/o female, tibetan speaking with PMHx of C. diff, gall bladder adenocarcinoma, Hypothyroidism presents to the ED for worsening diarrhea and vomiting x2 days. Patient is currently undergoing chemotherapy every 2 weeks with Dr. Kyle. Last chemo 12/13/21. Patient's daughter states pt has been having dull, intermittent abdominal pain for the past 2 weeks. Admitted to medicine for further management of colonic mass with small bowel obstruction.     12/26  CT A/P: Large necrotic cecal tumor with suspected synchronous involvement of the   ascending colon and resultant diffuse small bowel obstruction. Thickening   and enhancement of the terminal ileum is nonspecific and may reflect   local spread versus bowel ischemia. Lactate level correlation is   recommended. Necrotic lymphadenopathy.  Hypodense right hepatic lobe lesion concerning for metastasis.    12/27  Pt is afebrile, nausea/vomiting resolved, still had 2 episodes of diarrhea this morning with lack of appetite. Pending transfer to [facility].

## 2021-12-27 NOTE — PROGRESS NOTE ADULT - SUBJECTIVE AND OBJECTIVE BOX
Patient is a 61y old  Female who presents with a chief complaint of vomiting and diarrhea (26 Dec 2021 17:38)    pt seen in icu [  ], reg med floor [   ], bed [  ], chair at bedside [   ], a+o x3 [  ], lethargic [  ],  nad [  ]    engle [  ], ngt [  ], peg [  ], et tube [  ], cent line [  ], picc line [  ]        Allergies    No Known Allergies        Vitals    T(F): 97.7 (12-27-21 @ 03:52), Max: 99.1 (12-26-21 @ 15:20)  HR: 98 (12-27-21 @ 03:52) (97 - 112)  BP: 106/67 (12-27-21 @ 03:52) (106/67 - 128/67)  RR: 18 (12-27-21 @ 03:52) (18 - 19)  SpO2: 97% (12-27-21 @ 03:52) (97% - 98%)  Wt(kg): --  CAPILLARY BLOOD GLUCOSE          Labs                          9.8    7.95  )-----------( 187      ( 27 Dec 2021 05:28 )             30.9       12-27    142  |  109<H>  |  18  ----------------------------<  99  3.9   |  28  |  0.57    Ca    8.4      27 Dec 2021 05:28  Phos  4.1     12-27  Mg     2.2     12-27    TPro  6.1  /  Alb  2.5<L>  /  TBili  0.4  /  DBili  x   /  AST  18  /  ALT  30  /  AlkPhos  106  12-27                Radiology Results      Meds    MEDICATIONS  (STANDING):  dextrose 5% + sodium chloride 0.45%. 1000 milliLiter(s) (100 mL/Hr) IV Continuous <Continuous>  heparin   Injectable 5000 Unit(s) SubCutaneous every 8 hours  levothyroxine Injectable 37.5 MICROGram(s) IV Push at bedtime  pantoprazole  Injectable 40 milliGRAM(s) IV Push daily      MEDICATIONS  (PRN):  HYDROmorphone  Injectable 1 milliGRAM(s) IV Push every 4 hours PRN Moderate Pain (4 - 6)  ondansetron Injectable 4 milliGRAM(s) IV Push every 6 hours PRN Nausea      Physical Exam    Neuro :  no focal deficits  Respiratory: CTA B/L  CV: RRR, S1S2, no murmurs,   Abdominal: Soft, NT, ND +BS,  Extremities: No edema, + peripheral pulses    ASSESSMENT    Other specified disorders of intestines    HTN (hypertension)    Hypercholesterolemia    Hypothyroid    Hypothyroidism    Hyperlipidemia    Malignant neoplasm    No significant past surgical history    H/O breast surgery    S/P hysterectomy        PLAN     Patient is a 61y old  Female who presents with a chief complaint of vomiting and diarrhea (26 Dec 2021 17:38)    pt seen in ed [ x ], reg med floor [   ], bed [ x ], chair at bedside [   ], a+o x3 [ x ], lethargic [  ],  nad [ x ]        Allergies    No Known Allergies        Vitals    T(F): 97.7 (12-27-21 @ 03:52), Max: 99.1 (12-26-21 @ 15:20)  HR: 98 (12-27-21 @ 03:52) (97 - 112)  BP: 106/67 (12-27-21 @ 03:52) (106/67 - 128/67)  RR: 18 (12-27-21 @ 03:52) (18 - 19)  SpO2: 97% (12-27-21 @ 03:52) (97% - 98%)  Wt(kg): --  CAPILLARY BLOOD GLUCOSE          Labs                          9.8    7.95  )-----------( 187      ( 27 Dec 2021 05:28 )             30.9       12-27    142  |  109<H>  |  18  ----------------------------<  99  3.9   |  28  |  0.57    Ca    8.4      27 Dec 2021 05:28  Phos  4.1     12-27  Mg     2.2     12-27    TPro  6.1  /  Alb  2.5<L>  /  TBili  0.4  /  DBili  x   /  AST  18  /  ALT  30  /  AlkPhos  106  12-27    Carcinoembryonic Antigen (12.27.21 @ 03:19)   Carcinoembryonic Antigen: 3.9  Carcinoembryonic Antigen (10.31.20 @ 12:26)   Carcinoembryonic Antigen: 122.0:  Cancer Antigen, GI Ca 19-9 (12.27.21 @ 03:19)   Cancer Antigen, GI Ca 19-9: 11          Radiology Results          Meds    MEDICATIONS  (STANDING):  dextrose 5% + sodium chloride 0.45%. 1000 milliLiter(s) (100 mL/Hr) IV Continuous <Continuous>  heparin   Injectable 5000 Unit(s) SubCutaneous every 8 hours  levothyroxine Injectable 37.5 MICROGram(s) IV Push at bedtime  pantoprazole  Injectable 40 milliGRAM(s) IV Push daily      MEDICATIONS  (PRN):  HYDROmorphone  Injectable 1 milliGRAM(s) IV Push every 4 hours PRN Moderate Pain (4 - 6)  ondansetron Injectable 4 milliGRAM(s) IV Push every 6 hours PRN Nausea      Physical Exam    Neuro :  no focal deficits  Respiratory: CTA B/L  CV: RRR, S1S2, no murmurs,   Abdominal: Soft, mild RLQ tender to deep palp, ND +BS,  Extremities: No edema, + peripheral pulses        ASSESSMENT    abd pain likely 2nd to cecal mass,   right hepatic lobe metastasis  partial sbo,   diarrhea,   r/o c diff,   h/o gall bladder adenocarcinoma,   Hypothyroidism  HTN (hypertension)  Hypercholesterolemia  S/P flash-bso        PLAN    npo,   d/w ir   cecal lesion is a mass, no signs of fluid collection for drainage   gi cons for possible bx   cea decreased from 122 (10/31/2020) to 3.9 (12/26/21)   ca 19-9 noted above  surg onc f/u  pt may need transfer to [facility] for further surgical mgmt   f/u stool studies   f/u stool c diff   ivf  heme onc cons   cont zofran  cont pain control  cont current meds

## 2021-12-27 NOTE — PATIENT PROFILE ADULT - FALL HARM RISK - HARM RISK INTERVENTIONS

## 2021-12-27 NOTE — CONSULT NOTE ADULT - ASSESSMENT
INES DOCKERY is a 61y Female who presents with a chief complaint of nausea.    Metastatic Gallbladder Adenocarcinoma  - Patient currently on active treatment gemcitabine + oxaliplatin. Last treatment December 13th.  - No chemotherapy while inpatient. On discharge, to follow-up with Dr. Kyle.    Diarrhea, Nausea with Cecal Mass  - CT imaging with large necrotic cecal tumor with possible small bowel obstruction. Possible abscess?   - Surgery has been consulted; recommending transfer to [facility]. Our team will continue to follow her there should she be transferred.  - May require surgical intervention.   - C. diff pending.     We will continue to follow.    Raj Truong MD  Hematology/Oncology  C: [phone]  O: [phone]

## 2021-12-27 NOTE — ACUTE INTERFACILITY TRANSFER NOTE - PLAN OF CARE
Carcinoma of gall bladder  -on chemo (last 12/13)  -Oncologist Dr. Kyle following Problem: Diarrhea.   diarrhea  -f/u c. diff  -continue fluids  -monitor BNP 60 y/o female, tibetan speaking with PMHx of C. diff, gall bladder adenocarcinoma, Hypothyroidism presents to the ED for worsening diarrhea and vomiting x 2 days. Patient is currently undergoing chemotherapy every 2 weeks with Dr. Kyle. Last chemo 12/13/21. Patient's daughter states pt has been having dull, intermittent abdominal pain for the past 2 weeks. Admitted to medicine for further management of colonic mass with small bowel obstruction.     CT A/P: Large necrotic cecal tumor with suspected synchronous involvement of the   ascending colon and resultant diffuse small bowel obstruction. Thickening   and enhancement of the terminal ileum is nonspecific and may reflect   local spread versus bowel ischemia. Lactate level correlation is   recommended. Necrotic lymphadenopathy.  Hypodense right hepatic lobe lesion concerning for metastasis.    -surgical oncology consulted, may need surgical intervention at [facility] with Dr. Anna. 60 y/o female, tibetan speaking with PMHx of C. diff, gall bladder adenocarcinoma, Hypothyroidism presents to the ED for worsening diarrhea and vomiting x 2 days. Patient is currently undergoing chemotherapy every 2 weeks with Dr. Kyle. Last chemo 12/13/21. Patient's daughter states pt has been having dull, intermittent abdominal pain for the past 2 weeks. Admitted to medicine for further management of colonic mass with small bowel obstruction.     -surgical oncology consulted, may need surgical intervention at [facility] with Dr. Anna.

## 2021-12-27 NOTE — CONSULT NOTE ADULT - SUBJECTIVE AND OBJECTIVE BOX
CHIEF COMPLAINT  Nausea    HISTORY OF PRESENT ILLNESS  INES DOCKERY is a 61y Female who presents with a chief complaint of nausea.    Patient was admitted on December 26th after presenting with worsening abdominal pain as well as diarrhea. CT imaging showed a large necrotic cecal tumor with diffuse small bowel obstruction with surrounding necrotic lymphadenopathy. She was admitted for further evaluation and management.    Patient follows with Dr. Kyle outpatient for metastatic gallbladder cancer. Initially underwent carboplatin + paclitaxel followed by debulking and found to be gallbladder malignancy. She finished carboplatin and paclitaxel. She was started on gemcitabine + oxaliplatin in September 2021; 7th cycle of chemotherapy was administered on December 13th.     PAST MEDICAL AND SURGICAL HISTORY  Hypertension  Hyperlipidemia  Hypothyroidism  Gallbladder cancer, metastatic    FAMILY HISTORY  Non-contributory    SOCIAL HISTORY  Denies tobacco use    REVIEW OF SYSTEMS  A complete review of systems was performed; negative except per HPI    PHYSICAL EXAM  T(C): 36.5 (12-27-21 @ 03:52), Max: 37.3 (12-26-21 @ 15:20)  HR: 98 (12-27-21 @ 03:52) (97 - 99)  BP: 106/67 (12-27-21 @ 03:52) (106/67 - 116/61)  RR: 18 (12-27-21 @ 03:52) (18 - 19)  SpO2: 97% (12-27-21 @ 03:52) (97% - 98%)  Constitutional: alert, awake, in no acute distress  Eyes: PERRL, EOMI  HEENT: normocephalic, atraumatic  Neck: supple, non-tender  Cardiovascular: normal perfusion, no peripheral edema  Respiratory: normal respiratory efforts; no increased use of accessory muscles  Gastrointestinal: soft, non-tender  Musculoskeletal: normal range of motion, no deformities noted  Neurological: alert, CN II to XI grossly intact  Skin: warm, dry    LABORATORY DATA                        9.8    7.95  )-----------( 187      ( 27 Dec 2021 05:28 )             30.9       12-27    142  |  109<H>  |  18  ----------------------------<  99  3.9   |  28  |  0.57    Ca    8.4      27 Dec 2021 05:28  Phos  4.1     12-27  Mg     2.2     12-27    TPro  6.1  /  Alb  2.5<L>  /  TBili  0.4  /  DBili  x   /  AST  18  /  ALT  30  /  AlkPhos  106  12-27    RADIOLOGY REVIEW  CT Abd/Pelvis  IMPRESSION:  Large necrotic cecal tumor with suspected synchronous involvement of the   ascending colon and resultant diffuse small bowel obstruction. Thickening   and enhancement of the terminal ileum is nonspecific and may reflect   local spread versus bowel ischemia. Lactate level correlation is   recommended. Necrotic lymphadenopathy.    Hypodense right hepatic lobe lesion concerning for metastasis.    
SURGICAL ONCOLOGY CONSULT NOTE    chief complaint of Abd pain    HPI:  62 y/o F with a significant PMHx of hypothyroidism, HTN, HLD, metastatic Ovarian CA(to liver, lung) s/p ALYSSA-BSO, debulking of neoplasm, open appendectomy, cholecystectomy and omentectomy done at [facility] with chriss adj chemotherapy , newly diagnosed gallbladder ca (metastatic or local ?) , C. diff in the past presents to the ED with 6 days of abdominal pain, vomiting and diarrhea. + 4-6 episodes of NBNB vomiting , loose stools and change of stool caliber.  Patient states she received a booster 6 days ago with symptoms starting shortly afterwards. Denies fever, cough or any other acute complaints. Patient now on chemo for gallbladder cancer, oncologist Dr. Kyle. No other complaints at this time .           MEDICATIONS  (STANDING):  dextrose 5% + sodium chloride 0.45%. 1000 milliLiter(s) (100 mL/Hr) IV Continuous <Continuous>  enoxaparin Injectable 40 milliGRAM(s) SubCutaneous daily  pantoprazole  Injectable 40 milliGRAM(s) IV Push daily    MEDICATIONS  (PRN):  HYDROmorphone  Injectable 1 milliGRAM(s) IV Push every 4 hours PRN Moderate Pain (4 - 6)  ondansetron Injectable 4 milliGRAM(s) IV Push every 6 hours PRN Nausea      Allergies    No Known Allergies    Intolerances        Social History:      FAMILY HISTORY:  No pertinent family history in first degree relatives      Physical Exam:  Vital Signs Last 24 Hrs  T(C): 37.3 (26 Dec 2021 15:20), Max: 37.3 (26 Dec 2021 15:20)  T(F): 99.1 (26 Dec 2021 15:20), Max: 99.1 (26 Dec 2021 15:20)  HR: 99 (26 Dec 2021 15:20) (99 - 112)  BP: 112/63 (26 Dec 2021 15:20) (112/63 - 128/67)  BP(mean): --  RR: 18 (26 Dec 2021 15:20) (18 - 18)  SpO2: 98% (26 Dec 2021 15:20) (98% - 98%)    General:  A&Ox3,Appears stated age, No acute distress,  Head: NC/AT  EENT: PERRLA. EOMI. Conjunctiva and sclera clear. Pharynx clear.  Neck: Supple. No JVD  Lungs: CTA B/l. Nonlabored Respirations  CV: +S1S2, RRR  Abdomen: Soft, Nondistended, +non specific generalized tenderness more periumbilically & lower abdominal , no guarding, no rebound. Surgical wound is fully healed.   Extremities: Warm and well perfused. 2+ peripheral pulses b/l. Calf soft, nontender b/l. No pedal edema.            LABS:                        12.0   10.82 )-----------( 157      ( 26 Dec 2021 08:44 )             38.0     12-26    138  |  103  |  13  ----------------------------<  136<H>  3.9   |  26  |  0.74    Ca    9.9      26 Dec 2021 08:44  Mg     2.0     12-26    TPro  7.5  /  Alb  3.0<L>  /  TBili  0.4  /  DBili  x   /  AST  20  /  ALT  31  /  AlkPhos  133<H>  12-26    LIVER FUNCTIONS - ( 26 Dec 2021 08:44 )  Alb: 3.0 g/dL / Pro: 7.5 g/dL / ALK PHOS: 133 U/L / ALT: 31 U/L DA / AST: 20 U/L / GGT: x                 RADIOLOGY & ADDITIONAL STUDIES:  < from: CT Abdomen and Pelvis w/ IV Cont (12.26.21 @ 10:36) >  FINDINGS:  LOWER CHEST: Central venous catheter tip in the right atrium.    LIVER: A new 8 mm inferior right hepatic lobe hypodense lesion..  BILE DUCTS: Normal caliber.  GALLBLADDER: Cholecystectomy.  SPLEEN: Within normal limits.  PANCREAS: Within normal limits.  ADRENALS: Within normal limits.  KIDNEYS/URETERS: Within normal limits.    BLADDER: Minimally distended.  REPRODUCTIVE ORGANS: Hysterectomy.    BOWEL: Large necrotic cecal mass with heterogeneous masslike thickening   of the ascending colon to the level of the hepatic flexure. The cecal   mass obstructs the ileocecal valve resulting in diffuse small bowel   obstruction. A very small segment of ileocolic intussusception is   suggested (4:47 and 2:69). Thickening and hyperemia of the terminal ileum.  PERITONEUM: Trace ascites.  VESSELS: Within normal limits.  RETROPERITONEUM/LYMPH NODES: Right-sided pericolonic lymphadenopathy.   Necrotic lymph nodes along the SMA measure 29 x 19 mm and 18 x 18 mm.  ABDOMINAL WALL: Within normal limits.  BONES: Degenerative changes.    IMPRESSION:  Large necrotic cecal tumor with suspected synchronous involvement of the   ascending colon and resultant diffuse small bowel obstruction. Thickening   and enhancement of the terminal ileum is nonspecific and may reflect   local spread versus bowel ischemia. Lactate level correlation is   recommended. Necrotic lymphadenopathy.    Hypodense right hepatic lobe lesion concerning for metastasis.    < end of copied text >

## 2021-12-27 NOTE — ACUTE INTERFACILITY TRANSFER NOTE - HOSPITAL COURSE
12/26  CT A/P: Large necrotic cecal tumor with suspected synchronous involvement of the   ascending colon and resultant diffuse small bowel obstruction. Thickening   and enhancement of the terminal ileum is nonspecific and may reflect   local spread versus bowel ischemia. Lactate level correlation is   recommended. Necrotic lymphadenopathy.  Hypodense right hepatic lobe lesion concerning for metastasis.    12/27  Pt is afebrile, nausea/vomiting resolved, still had 2 episodes of diarrhea this morning with lack of appetite. Pending transfer to [facility].      60 y/o female, ibetan speaking with PMHx of C. diff, gall bladder adenocarcinoma, Hypothyroidism presents to the ED for worsening diarrhea and vomiting x2 days. Patient is currently undergoing chemotherapy every 2 weeks with Dr. Kyle. Last chemo 12/13/21. Patient's daughter states pt has been having dull, intermittent abdominal pain for the past 2 weeks. Admitted to medicine for further management of colonic mass with small bowel obstruction.   Will transfer to [facility] for surgical-onc intervention for colonic  mass, sbo      12/26  CT A/P: Large necrotic cecal tumor with suspected synchronous involvement of the   ascending colon and resultant diffuse small bowel obstruction. Thickening   and enhancement of the terminal ileum is nonspecific and may reflect   local spread versus bowel ischemia. Lactate level correlation is   recommended. Necrotic lymphadenopathy.  Hypodense right hepatic lobe lesion concerning for metastasis.    12/27  Pt is afebrile, nausea/vomiting resolved, still had 2 episodes of diarrhea this morning with lack of appetite. Pending transfer to [facility].     1/2/22  < from: CT Abdomen and Pelvis No Cont (01.02.22 @ 13:11) >  High-grade small bowel obstruction at the level of the cecum   with extensive local spread of malignancy and new mild intraperitoneal   free fluid.    Report given to ED MD. Dr. Dennis Seymour at [facility] per attending

## 2021-12-27 NOTE — CHART NOTE - NSCHARTNOTEFT_GEN_A_CORE
pt fro transfer to  for further mgmt  pt accepted to dr nargis mayer's service  consulting surgeon  dr carie davis

## 2021-12-27 NOTE — PATIENT PROFILE ADULT - CAREGIVER ADDRESS
[address], [facility] [address] [address], [facility] [address]

## 2021-12-28 LAB
C DIFF BY PCR RESULT: DETECTED
C DIFF TOX GENS STL QL NAA+PROBE: SIGNIFICANT CHANGE UP

## 2021-12-28 RX ORDER — VANCOMYCIN HCL 1 G
125 VIAL (EA) INTRAVENOUS EVERY 6 HOURS
Refills: 0 | Status: DISCONTINUED | OUTPATIENT
Start: 2021-12-28 | End: 2022-01-03

## 2021-12-28 RX ADMIN — HEPARIN SODIUM 5000 UNIT(S): 5000 INJECTION INTRAVENOUS; SUBCUTANEOUS at 14:17

## 2021-12-28 RX ADMIN — HEPARIN SODIUM 5000 UNIT(S): 5000 INJECTION INTRAVENOUS; SUBCUTANEOUS at 23:07

## 2021-12-28 RX ADMIN — Medication 125 MILLIGRAM(S): at 23:07

## 2021-12-28 RX ADMIN — Medication 37.5 MICROGRAM(S): at 23:06

## 2021-12-28 RX ADMIN — HEPARIN SODIUM 5000 UNIT(S): 5000 INJECTION INTRAVENOUS; SUBCUTANEOUS at 05:21

## 2021-12-28 RX ADMIN — PANTOPRAZOLE SODIUM 40 MILLIGRAM(S): 20 TABLET, DELAYED RELEASE ORAL at 14:15

## 2021-12-28 NOTE — PROGRESS NOTE ADULT - SUBJECTIVE AND OBJECTIVE BOX
Patient is a 61y old  Female who presents with a chief complaint of vomiting and diarrhea (27 Dec 2021 18:00)    pt seen in ed [ x ], reg med floor [   ], bed [ x ], chair at bedside [   ], a+o x3 [ x ], lethargic [  ],    nad [ x ]      Allergies    No Known Allergies        Vitals    T(F): 97.5 (12-28-21 @ 05:50), Max: 98.5 (12-27-21 @ 20:25)  HR: 85 (12-28-21 @ 05:50) (83 - 92)  BP: 101/48 (12-28-21 @ 05:50) (101/48 - 121/71)  RR: 18 (12-28-21 @ 05:50) (18 - 18)  SpO2: 99% (12-28-21 @ 05:50) (95% - 99%)  Wt(kg): --  CAPILLARY BLOOD GLUCOSE          Labs                          9.8    7.95  )-----------( 187      ( 27 Dec 2021 05:28 )             30.9       12-27    142  |  109<H>  |  18  ----------------------------<  99  3.9   |  28  |  0.57    Ca    8.4      27 Dec 2021 05:28  Phos  4.1     12-27  Mg     2.2     12-27    TPro  6.1  /  Alb  2.5<L>  /  TBili  0.4  /  DBili  x   /  AST  18  /  ALT  30  /  AlkPhos  106  12-27                Radiology Results      Meds    MEDICATIONS  (STANDING):  dextrose 5% + sodium chloride 0.45%. 1000 milliLiter(s) (100 mL/Hr) IV Continuous <Continuous>  heparin   Injectable 5000 Unit(s) SubCutaneous every 8 hours  levothyroxine Injectable 37.5 MICROGram(s) IV Push at bedtime  pantoprazole  Injectable 40 milliGRAM(s) IV Push daily      MEDICATIONS  (PRN):  HYDROmorphone  Injectable 1 milliGRAM(s) IV Push every 4 hours PRN Moderate Pain (4 - 6)  ondansetron Injectable 4 milliGRAM(s) IV Push every 6 hours PRN Nausea      Physical Exam    Neuro :  no focal deficits  Respiratory: CTA B/L  CV: RRR, S1S2, no murmurs,   Abdominal: Soft, mild RLQ tender to deep palp, ND +BS,  Extremities: No edema, + peripheral pulses        ASSESSMENT    abd pain likely 2nd to cecal mass,   right hepatic lobe metastasis  partial sbo,   diarrhea,   r/o c diff,   h/o gall bladder adenocarcinoma,   Hypothyroidism  HTN (hypertension)  Hypercholesterolemia  S/P flash-bso        PLAN    npo,   d/w ir   cecal lesion is a mass, no signs of fluid collection for drainage   gi cons for possible bx   cea decreased from 122 (10/31/2020) to 3.9 (12/26/21)   ca 19-9 noted above  surg onc f/u  pt may need transfer to [facility] for further surgical mgmt   f/u stool studies   f/u stool c diff   ivf  heme onc cons   cont zofran  cont pain control  pt fro transfer to  for further mgmt  pt accepted to dr nargis mayer's service  consulting surgeon  dr carie davis.  cont current meds             Patient is a 61y old  Female who presents with a chief complaint of vomiting and diarrhea (27 Dec 2021 18:00)    pt seen in ed [ x ], reg med floor [   ], bed [ x ], chair at bedside [   ], a+o x3 [ x ], lethargic [  ],    nad [ x ]      Allergies    No Known Allergies        Vitals    T(F): 97.5 (12-28-21 @ 05:50), Max: 98.5 (12-27-21 @ 20:25)  HR: 85 (12-28-21 @ 05:50) (83 - 92)  BP: 101/48 (12-28-21 @ 05:50) (101/48 - 121/71)  RR: 18 (12-28-21 @ 05:50) (18 - 18)  SpO2: 99% (12-28-21 @ 05:50) (95% - 99%)  Wt(kg): --  CAPILLARY BLOOD GLUCOSE          Labs                          9.8    7.95  )-----------( 187      ( 27 Dec 2021 05:28 )             30.9       12-27    142  |  109<H>  |  18  ----------------------------<  99  3.9   |  28  |  0.57    Ca    8.4      27 Dec 2021 05:28  Phos  4.1     12-27  Mg     2.2     12-27    TPro  6.1  /  Alb  2.5<L>  /  TBili  0.4  /  DBili  x   /  AST  18  /  ALT  30  /  AlkPhos  106  12-27                Radiology Results      Meds    MEDICATIONS  (STANDING):  dextrose 5% + sodium chloride 0.45%. 1000 milliLiter(s) (100 mL/Hr) IV Continuous <Continuous>  heparin   Injectable 5000 Unit(s) SubCutaneous every 8 hours  levothyroxine Injectable 37.5 MICROGram(s) IV Push at bedtime  pantoprazole  Injectable 40 milliGRAM(s) IV Push daily      MEDICATIONS  (PRN):  HYDROmorphone  Injectable 1 milliGRAM(s) IV Push every 4 hours PRN Moderate Pain (4 - 6)  ondansetron Injectable 4 milliGRAM(s) IV Push every 6 hours PRN Nausea      Physical Exam    Neuro :  no focal deficits  Respiratory: CTA B/L  CV: RRR, S1S2, no murmurs,   Abdominal: Soft, mild RLQ tender to deep palp, ND +BS,  Extremities: No edema, + peripheral pulses        ASSESSMENT    abd pain likely 2nd to cecal mass,   right hepatic lobe metastasis  partial sbo,   diarrhea,   r/o c diff,   h/o gall bladder adenocarcinoma,   Hypothyroidism  HTN (hypertension)  Hypercholesterolemia  S/P flash-bso        PLAN    npo,   d/w ir   cecal lesion is a mass, no signs of fluid collection for drainage   gi cons for possible bx   cea decreased from 122 (10/31/2020) to 3.9 (12/26/21)   ca 19-9 noted    surg onc f/u  pt for transfer to [facility] for further surgical mgmt with dr Anna   f/u stool studies   f/u stool c diff   ivf  heme onc f/u   Patient currently on active treatment gemcitabine + oxaliplatin. Last treatment December 13th.  No chemotherapy while inpatient. On discharge, to follow-up with Dr. Kyle.  Surgery has been consulted; recommending transfer to [facility]. Our team will continue to follow her there should she be transferred.  pt may require surgical intervention.   C. diff pending.   cont zofran  cont pain control  pt for transfer to [facility] for further mgmt  pt accepted to dr nargis mayer's service  consulting surgeon  dr carie anna.  cont current meds

## 2021-12-28 NOTE — PROGRESS NOTE ADULT - SUBJECTIVE AND OBJECTIVE BOX
NP Note discussed with  Primary Attending    INTERVAL HPI/OVERNIGHT EVENTS: no new complaints    MEDICATIONS  (STANDING):  dextrose 5% + sodium chloride 0.45%. 1000 milliLiter(s) (100 mL/Hr) IV Continuous <Continuous>  heparin   Injectable 5000 Unit(s) SubCutaneous every 8 hours  levothyroxine Injectable 37.5 MICROGram(s) IV Push at bedtime  pantoprazole  Injectable 40 milliGRAM(s) IV Push daily    MEDICATIONS  (PRN):  HYDROmorphone  Injectable 1 milliGRAM(s) IV Push every 4 hours PRN Moderate Pain (4 - 6)  ondansetron Injectable 4 milliGRAM(s) IV Push every 6 hours PRN Nausea      __________________________________________________  REVIEW OF SYSTEMS:    CONSTITUTIONAL: No fever,   EYES: no acute visual disturbances  NECK: No pain or stiffness  RESPIRATORY: No cough; No shortness of breath  CARDIOVASCULAR: No chest pain, no palpitations  GASTROINTESTINAL: No pain. No nausea or vomiting; No diarrhea   NEUROLOGICAL: No headache or numbness, no tremors  MUSCULOSKELETAL: No joint pain, no muscle pain  GENITOURINARY: no dysuria, no frequency, no hesitancy  PSYCHIATRY: no depression , no anxiety  ALL OTHER  ROS negative        Vital Signs Last 24 Hrs  T(C): 36.4 (28 Dec 2021 05:50), Max: 36.9 (27 Dec 2021 20:25)  T(F): 97.5 (28 Dec 2021 05:50), Max: 98.5 (27 Dec 2021 20:25)  HR: 85 (28 Dec 2021 05:50) (83 - 92)  BP: 101/48 (28 Dec 2021 05:50) (101/48 - 121/71)  BP(mean): --  RR: 18 (28 Dec 2021 05:50) (18 - 18)  SpO2: 99% (28 Dec 2021 05:50) (95% - 99%)    ________________________________________________  PHYSICAL EXAM:  GENERAL: NAD  HEENT: Normocephalic;  conjunctivae and sclerae clear; moist mucous membranes;   NECK : supple  CHEST/LUNG: Clear to auscultation bilaterally with good air entry   HEART: S1 S2  regular; no murmurs, gallops or rubs  ABDOMEN: Soft, Nontender, Nondistended; Bowel sounds present  EXTREMITIES: no cyanosis; no edema; no calf tenderness  SKIN: warm and dry; no rash  NERVOUS SYSTEM:  Awake and alert; Oriented  to place, person and time ; no new deficits    _________________________________________________  LABS:                        9.8    7.95  )-----------( 187      ( 27 Dec 2021 05:28 )             30.9         142  |  109<H>  |  18  ----------------------------<  99  3.9   |  28  |  0.57    Ca    8.4      27 Dec 2021 05:28  Phos  4.1       Mg     2.2         TPro  6.1  /  Alb  2.5<L>  /  TBili  0.4  /  DBili  x   /  AST  18  /  ALT  30  /  AlkPhos  106      PT/INR - ( 26 Dec 2021 19:12 )   PT: 13.1 sec;   INR: 1.11 ratio         PTT - ( 26 Dec 2021 19:12 )  PTT:29.2 sec  Urinalysis Basic - ( 27 Dec 2021 21:54 )    Color: Yellow / Appearance: Clear / S.025 / pH: x  Gluc: x / Ketone: Small  / Bili: Small / Urobili: 1   Blood: x / Protein: 30 mg/dL / Nitrite: Negative   Leuk Esterase: Trace / RBC: 0-2 /HPF / WBC 3-5 /HPF   Sq Epi: x / Non Sq Epi: Few /HPF / Bacteria: Moderate /HPF      CAPILLARY BLOOD GLUCOSE            RADIOLOGY & ADDITIONAL TESTS:    Imaging  Reviewed:  YES    Consultant(s) Notes Reviewed:   YES      Plan of care was discussed with patient and /or primary care giver; all questions and concerns were addressed

## 2021-12-28 NOTE — PROGRESS NOTE ADULT - ASSESSMENT
60 y/o female, tibetan speaking with PMHx of C. diff, gall bladder adenocarcinoma, Hypothyroidism presents to the ED for worsening diarrhea and vomiting x2 days. Patient is currently undergoing chemotherapy every 2 weeks with Dr. Kyle. Last chemo 12/13/21. Patient's daughter states pt has been having dull, intermittent abdominal pain for the past 2 weeks. Admitted to medicine for further management of colonic mass with small bowel obstruction.     CT A/P (12/26): Large necrotic cecal tumor with suspected synchronous involvement of the   ascending colon and resultant diffuse small bowel obstruction. Thickening   and enhancement of the terminal ileum is nonspecific and may reflect   local spread versus bowel ischemia. Lactate level correlation is   recommended. Necrotic lymphadenopathy.  Hypodense right hepatic lobe lesion concerning for metastasis.    12/28  Pt is afebrile, nausea/vomiting resolved, npo for now with IVF. Pt states 3 loose BM today. c/o mild pain to mid abdomen. C diff stool collected last night.  Pending transfer to [facility] for surgical intervention with dr. Wilfrido Victor.

## 2021-12-28 NOTE — PROGRESS NOTE ADULT - CONVERSATION DETAILS
Discussed with both patient and daughter for updating condition, test results, including life sustaining treatment, wants aggressive tx, full code for now. both agreeable for transfer to [facility] for surgical management. refusing palliative care consult.

## 2021-12-29 DIAGNOSIS — D63.8 ANEMIA IN OTHER CHRONIC DISEASES CLASSIFIED ELSEWHERE: ICD-10-CM

## 2021-12-29 DIAGNOSIS — A04.72 ENTEROCOLITIS DUE TO CLOSTRIDIUM DIFFICILE, NOT SPECIFIED AS RECURRENT: ICD-10-CM

## 2021-12-29 LAB
ALBUMIN SERPL ELPH-MCNC: 2.1 G/DL — LOW (ref 3.5–5)
ALP SERPL-CCNC: 108 U/L — SIGNIFICANT CHANGE UP (ref 40–120)
ALT FLD-CCNC: 42 U/L DA — SIGNIFICANT CHANGE UP (ref 10–60)
ANION GAP SERPL CALC-SCNC: 6 MMOL/L — SIGNIFICANT CHANGE UP (ref 5–17)
AST SERPL-CCNC: 26 U/L — SIGNIFICANT CHANGE UP (ref 10–40)
BILIRUB SERPL-MCNC: 0.2 MG/DL — SIGNIFICANT CHANGE UP (ref 0.2–1.2)
BUN SERPL-MCNC: 5 MG/DL — LOW (ref 7–18)
CALCIUM SERPL-MCNC: 8.7 MG/DL — SIGNIFICANT CHANGE UP (ref 8.4–10.5)
CHLORIDE SERPL-SCNC: 110 MMOL/L — HIGH (ref 96–108)
CO2 SERPL-SCNC: 28 MMOL/L — SIGNIFICANT CHANGE UP (ref 22–31)
CREAT SERPL-MCNC: 0.55 MG/DL — SIGNIFICANT CHANGE UP (ref 0.5–1.3)
CULTURE RESULTS: SIGNIFICANT CHANGE UP
GLUCOSE SERPL-MCNC: 89 MG/DL — SIGNIFICANT CHANGE UP (ref 70–99)
HCT VFR BLD CALC: 32.4 % — LOW (ref 34.5–45)
HGB BLD-MCNC: 10.3 G/DL — LOW (ref 11.5–15.5)
MCHC RBC-ENTMCNC: 27.4 PG — SIGNIFICANT CHANGE UP (ref 27–34)
MCHC RBC-ENTMCNC: 31.8 GM/DL — LOW (ref 32–36)
MCV RBC AUTO: 86.2 FL — SIGNIFICANT CHANGE UP (ref 80–100)
NRBC # BLD: 0 /100 WBCS — SIGNIFICANT CHANGE UP (ref 0–0)
PLATELET # BLD AUTO: 268 K/UL — SIGNIFICANT CHANGE UP (ref 150–400)
POTASSIUM SERPL-MCNC: 3.6 MMOL/L — SIGNIFICANT CHANGE UP (ref 3.5–5.3)
POTASSIUM SERPL-SCNC: 3.6 MMOL/L — SIGNIFICANT CHANGE UP (ref 3.5–5.3)
PROT SERPL-MCNC: 5.9 G/DL — LOW (ref 6–8.3)
RBC # BLD: 3.76 M/UL — LOW (ref 3.8–5.2)
RBC # FLD: 15.9 % — HIGH (ref 10.3–14.5)
SODIUM SERPL-SCNC: 144 MMOL/L — SIGNIFICANT CHANGE UP (ref 135–145)
SPECIMEN SOURCE: SIGNIFICANT CHANGE UP
WBC # BLD: 7.16 K/UL — SIGNIFICANT CHANGE UP (ref 3.8–10.5)
WBC # FLD AUTO: 7.16 K/UL — SIGNIFICANT CHANGE UP (ref 3.8–10.5)

## 2021-12-29 PROCEDURE — 74018 RADEX ABDOMEN 1 VIEW: CPT | Mod: 26

## 2021-12-29 RX ORDER — SODIUM CHLORIDE 9 MG/ML
1000 INJECTION, SOLUTION INTRAVENOUS
Refills: 0 | Status: COMPLETED | OUTPATIENT
Start: 2021-12-29 | End: 2021-12-30

## 2021-12-29 RX ADMIN — HEPARIN SODIUM 5000 UNIT(S): 5000 INJECTION INTRAVENOUS; SUBCUTANEOUS at 05:11

## 2021-12-29 RX ADMIN — Medication 125 MILLIGRAM(S): at 23:24

## 2021-12-29 RX ADMIN — SODIUM CHLORIDE 100 MILLILITER(S): 9 INJECTION, SOLUTION INTRAVENOUS at 17:50

## 2021-12-29 RX ADMIN — Medication 37.5 MICROGRAM(S): at 23:49

## 2021-12-29 RX ADMIN — HEPARIN SODIUM 5000 UNIT(S): 5000 INJECTION INTRAVENOUS; SUBCUTANEOUS at 23:32

## 2021-12-29 RX ADMIN — Medication 125 MILLIGRAM(S): at 17:45

## 2021-12-29 RX ADMIN — HEPARIN SODIUM 5000 UNIT(S): 5000 INJECTION INTRAVENOUS; SUBCUTANEOUS at 14:22

## 2021-12-29 RX ADMIN — Medication 125 MILLIGRAM(S): at 05:10

## 2021-12-29 RX ADMIN — PANTOPRAZOLE SODIUM 40 MILLIGRAM(S): 20 TABLET, DELAYED RELEASE ORAL at 11:51

## 2021-12-29 RX ADMIN — Medication 125 MILLIGRAM(S): at 11:51

## 2021-12-29 NOTE — PROGRESS NOTE ADULT - SUBJECTIVE AND OBJECTIVE BOX
NP Note discussed with  Primary Attending    INTERVAL HPI/OVERNIGHT EVENTS: no new complaints    MEDICATIONS  (STANDING):  dextrose 5% + sodium chloride 0.45%. 1000 milliLiter(s) (100 mL/Hr) IV Continuous <Continuous>  heparin   Injectable 5000 Unit(s) SubCutaneous every 8 hours  levothyroxine Injectable 37.5 MICROGram(s) IV Push at bedtime  pantoprazole  Injectable 40 milliGRAM(s) IV Push daily  vancomycin    Solution 125 milliGRAM(s) Oral every 6 hours    MEDICATIONS  (PRN):  HYDROmorphone  Injectable 1 milliGRAM(s) IV Push every 4 hours PRN Moderate Pain (4 - 6)  ondansetron Injectable 4 milliGRAM(s) IV Push every 6 hours PRN Nausea      __________________________________________________  REVIEW OF SYSTEMS:    CONSTITUTIONAL: No fever,   EYES: no acute visual disturbances  NECK: No pain or stiffness  RESPIRATORY: No cough; No shortness of breath  CARDIOVASCULAR: No chest pain, no palpitations  GASTROINTESTINAL: mild abdominal pain. No nausea or vomiting; less episode of diarrhea  NEUROLOGICAL: No headache or numbness, no tremors  MUSCULOSKELETAL: No joint pain, no muscle pain  GENITOURINARY: no dysuria, no frequency, no hesitancy  PSYCHIATRY: no depression , no anxiety  ALL OTHER  ROS negative        Vital Signs Last 24 Hrs  T(C): 36.7 (29 Dec 2021 05:43), Max: 36.7 (28 Dec 2021 20:25)  T(F): 98 (29 Dec 2021 05:43), Max: 98 (28 Dec 2021 20:25)  HR: 83 (29 Dec 2021 05:43) (81 - 88)  BP: 100/64 (29 Dec 2021 05:43) (100/64 - 118/62)  BP(mean): --  RR: 17 (29 Dec 2021 05:43) (16 - 18)  SpO2: 97% (29 Dec 2021 05:43) (97% - 99%)    ________________________________________________  PHYSICAL EXAM:  GENERAL: NAD  HEENT: Normocephalic;  conjunctivae and sclerae clear; moist mucous membranes;   NECK : supple  CHEST/LUNG: Clear to auscultation bilaterally with good air entry   HEART: S1 S2  regular; no murmurs, gallops or rubs  ABDOMEN: Soft, Nontender, Nondistended; Bowel sounds present  EXTREMITIES: no cyanosis; no edema; no calf tenderness  SKIN: warm and dry; no rash  NERVOUS SYSTEM:  Awake and alert; Oriented  to place, person and time ; no new deficits    _________________________________________________  LABS:                        10.3   7.16  )-----------( 268      ( 29 Dec 2021 07:47 )             32.4         144  |  110<H>  |  5<L>  ----------------------------<  89  3.6   |  28  |  0.55    Ca    8.7      29 Dec 2021 07:47    TPro  5.9<L>  /  Alb  2.1<L>  /  TBili  0.2  /  DBili  x   /  AST  26  /  ALT  42  /  AlkPhos  108        Urinalysis Basic - ( 27 Dec 2021 21:54 )    Color: Yellow / Appearance: Clear / S.025 / pH: x  Gluc: x / Ketone: Small  / Bili: Small / Urobili: 1   Blood: x / Protein: 30 mg/dL / Nitrite: Negative   Leuk Esterase: Trace / RBC: 0-2 /HPF / WBC 3-5 /HPF   Sq Epi: x / Non Sq Epi: Few /HPF / Bacteria: Moderate /HPF      CAPILLARY BLOOD GLUCOSE    RADIOLOGY & ADDITIONAL TESTS:    Imaging  Reviewed:  YES  < from: CT Abdomen and Pelvis w/ IV Cont (21 @ 10:36) >    ACC: [id number] EXAM:  CT ABDOMEN AND PELVIS IC                          PROCEDURE DATE:  2021          INTERPRETATION:  CLINICAL INFORMATION: Diffuse abdominal pain, vomiting   and diarrhea. History of gallbladder cancer. On chemotherapy.    COMPARISON: CT abdomen pelvis 2021    CONTRAST/COMPLICATIONS:  IV Contrast: Omnipaque 350  90 cc administered   10 cc discarded  Oral Contrast: NONE  Complications: None reported at time of study completion    PROCEDURE:  CT of the Abdomen and Pelvis was performed.  Sagittal and coronal reformats were performed.    FINDINGS:  LOWER CHEST: Central venous catheter tip in the right atrium.    LIVER: A new 8 mm inferior right hepatic lobe hypodense lesion..  BILE DUCTS: Normal caliber.  GALLBLADDER: Cholecystectomy.  SPLEEN: Within normal limits.  PANCREAS: Within normal limits.  ADRENALS: Within normal limits.  KIDNEYS/URETERS: Within normal limits.    BLADDER: Minimally distended.  REPRODUCTIVE ORGANS: Hysterectomy.    BOWEL: Large necrotic cecal mass with heterogeneous masslike thickening   of the ascending colon to the level of the hepatic flexure. The cecal   mass obstructs the ileocecal valve resulting in diffuse small bowel   obstruction. A very small segment of ileocolic intussusception is   suggested (4:47 and 2:69). Thickening and hyperemia of the terminal ileum.  PERITONEUM: Trace ascites.  VESSELS: Within normal limits.  RETROPERITONEUM/LYMPH NODES: Right-sided pericolonic lymphadenopathy.   Necrotic lymph nodes along the SMA measure 29 x 19 mm and 18 x 18 mm.  ABDOMINAL WALL: Within normal limits.  BONES: Degenerative changes.    IMPRESSION:  Large necrotic cecal tumor with suspected synchronous involvement of the   ascending colon and resultant diffuse small bowel obstruction. Thickening   and enhancement of the terminal ileum is nonspecific and may reflect   local spread versus bowel ischemia. Lactate level correlation is   recommended. Necrotic lymphadenopathy.    Hypodense right hepatic lobe lesion concerning for metastasis.    --- End of Report ---    VILMA CHAN MD; Attending Radiologist  This document has been electronically signed. Dec 26 2021 11:25AM    < end of copied text >    Consultant(s) Notes Reviewed:   YES      Plan of care was discussed with patient and /or primary care giver; all questions and concerns were addressed

## 2021-12-29 NOTE — PROGRESS NOTE ADULT - ASSESSMENT
INES DOCKERY is a 61y Female who presents with a chief complaint of nausea.    Metastatic Gallbladder Adenocarcinoma  - Patient currently on active treatment gemcitabine + oxaliplatin. Last treatment December 13th.  - No chemotherapy while inpatient. On discharge, to follow-up with Dr. Kyle.    Clostridioides difficile Infection  - Patient tested positive, which can explain her diarrhea.  - Continue PO Vancomyin    Nausea with Cecal Mass  - CT imaging with large necrotic cecal tumor with possible small bowel obstruction.  - Surgery has been consulted; recommending transfer to [facility]. Our team will continue to follow her there should she be transferred.  - May require surgical intervention.   - She remains NPO with IVF in the interim.     We will continue to follow.    Raj Truong MD  Hematology/Oncology  C: [phone]  O: [phone]

## 2021-12-29 NOTE — DIETITIAN INITIAL EVALUATION ADULT. - PERTINENT LABORATORY DATA
12-29 Na144 mmol/L Glu 89 mg/dL K+ 3.6 mmol/L Cr  0.55 mg/dL BUN 5 mg/dL<L>   12-27 Phos 4.1 mg/dL   12-29 Alb 2.1 g/dL<L>

## 2021-12-29 NOTE — PROGRESS NOTE ADULT - ASSESSMENT
60 y/o female, tibetan speaking with PMHx of C. diff, gall bladder adenocarcinoma, Hypothyroidism presents to the ED for worsening diarrhea and vomiting x2 days. Patient is currently undergoing chemotherapy every 2 weeks with Dr. Kyle. Last chemo 12/13/21. Patient's daughter states pt has been having dull, intermittent abdominal pain for the past 2 weeks. Admitted to medicine for further management of colonic mass with small bowel obstruction.     CT A/P (12/26): Large necrotic cecal tumor with suspected synchronous involvement of the ascending colon and resultant diffuse small bowel obstruction. Thickening and enhancement of the terminal ileum is nonspecific and may reflect   local spread versus bowel ischemia. Lactate level correlation is recommended. Necrotic lymphadenopathy. Hypodense right hepatic lobe lesion concerning for metastasis.    12/29  Pt is afebrile, nausea/vomiting resolved, npo for now with IVF. Pt states 2-3 loose BM since last night. c/o mild pain to mid abdomen. C diff stool positive, started Vancomycin po, contact isolation.   no need for inpatient chemo at this time as per hem/onc dr. Kyle.   Pending transfer to [facility] for surgical-onc intervention with dr. Wilfrido Victor.    62 y/o female, tibetan speaking with PMHx of C. diff, gall bladder adenocarcinoma, Hypothyroidism presents to the ED for worsening diarrhea and vomiting x2 days. Patient is currently undergoing chemotherapy every 2 weeks with Dr. Kyle. Last chemo 12/13/21. Patient's daughter states pt has been having dull, intermittent abdominal pain for the past 2 weeks. Admitted to medicine for further management of colonic mass with small bowel obstruction.     CT A/P (12/26): Large necrotic cecal tumor with suspected synchronous involvement of the ascending colon and resultant diffuse small bowel obstruction. Thickening and enhancement of the terminal ileum is nonspecific and may reflect   local spread versus bowel ischemia. Lactate level correlation is recommended. Necrotic lymphadenopathy. Hypodense right hepatic lobe lesion concerning for metastasis.    12/29  Pt is afebrile, nausea/vomiting resolved, npo for now with IVF. Pt states 2-3 loose BM since last night. c/o mild pain to mid abdomen. C diff stool positive, started Vancomycin po, contact isolation.   no need for inpatient chemo at this time as per hem/onc dr. Kyle.   ordered Abd xray for reassessment for SBO, will advance diet if it is improving.   Pending transfer to [facility] for surgical-onc intervention with dr. Wilfrido Victor.

## 2021-12-29 NOTE — CHART NOTE - NSCHARTNOTEFT_GEN_A_CORE
Discussed Xray result with radiology dr. THOMPSON, Pt still has persistent air filled dilated central loops of small bowel with diffuse obstruction.     < from: Xray Abdomen 1 View PORTABLE -Routine (Xray Abdomen 1 View PORTABLE -Routine .) (12.29.21 @ 10:45) >  There are persistent air-filled dilated central loops of small bowel.  Some air is noted within the left colon.  Evaluation for free intraperitoneal air is limited on this exam.  Surgical clips are present in the right upper quadrant abdomen.  IMPRESSION:  Findings as discussed above.  --- End of Report ---    Will keep NPO, IVF for now.   Called transfer center this afternoon, still waiting for room availability. informed pt is C diff positive will need contact isolation. Discussed Xray result with radiology dr. THOMPSON, Pt still has persistent air filled dilated central loops of small bowel with diffuse obstruction.     < from: Xray Abdomen 1 View PORTABLE -Routine (Xray Abdomen 1 View PORTABLE -Routine .) (12.29.21 @ 10:45) >  There are persistent air-filled dilated central loops of small bowel.  Some air is noted within the left colon.  Evaluation for free intraperitoneal air is limited on this exam.  Surgical clips are present in the right upper quadrant abdomen.  IMPRESSION:  Findings as discussed above.  --- End of Report ---    Will keep NPO, IVF for now.   Called transfer center this afternoon, still waiting for room availability. informed pt is C diff positive will need contact isolation.  Discussed with Patient and daughter for plan.

## 2021-12-29 NOTE — DIETITIAN INITIAL EVALUATION ADULT. - FACTORS AFF FOOD INTAKE
weakness, SBO, gall bladder adenocarcinoma, colonic mass, Hypothyroidism, chemo. tx, h/o breast surgery/difficulty with food procurement/preparation/persistent diarrhea/vomiting/other (specify)

## 2021-12-29 NOTE — DIETITIAN INITIAL EVALUATION ADULT. - PERTINENT MEDS FT
MEDICATIONS  (STANDING):  dextrose 5% + sodium chloride 0.45%. 1000 milliLiter(s) (100 mL/Hr) IV Continuous <Continuous>  heparin   Injectable 5000 Unit(s) SubCutaneous every 8 hours  levothyroxine Injectable 37.5 MICROGram(s) IV Push at bedtime  pantoprazole  Injectable 40 milliGRAM(s) IV Push daily  vancomycin    Solution 125 milliGRAM(s) Oral every 6 hours    MEDICATIONS  (PRN):  HYDROmorphone  Injectable 1 milliGRAM(s) IV Push every 4 hours PRN Moderate Pain (4 - 6)  ondansetron Injectable 4 milliGRAM(s) IV Push every 6 hours PRN Nausea

## 2021-12-29 NOTE — PROGRESS NOTE ADULT - SUBJECTIVE AND OBJECTIVE BOX
Patient is a 61y old  Female who presents with a chief complaint of vomiting and diarrhea (28 Dec 2021 11:26)    pt seen in ed [ x ], reg med floor [   ], bed [ x ], chair at bedside [   ], a+o x3 [ x ], lethargic [  ],    nad [ x ]        Allergies    No Known Allergies        Vitals    T(F): 98 (12-29-21 @ 05:43), Max: 98 (12-28-21 @ 20:25)  HR: 83 (12-29-21 @ 05:43) (81 - 88)  BP: 100/64 (12-29-21 @ 05:43) (100/64 - 118/62)  RR: 17 (12-29-21 @ 05:43) (16 - 18)  SpO2: 97% (12-29-21 @ 05:43) (97% - 99%)  Wt(kg): --  CAPILLARY BLOOD GLUCOSE          Labs                          Radiology Results      Meds    MEDICATIONS  (STANDING):  dextrose 5% + sodium chloride 0.45%. 1000 milliLiter(s) (100 mL/Hr) IV Continuous <Continuous>  heparin   Injectable 5000 Unit(s) SubCutaneous every 8 hours  levothyroxine Injectable 37.5 MICROGram(s) IV Push at bedtime  pantoprazole  Injectable 40 milliGRAM(s) IV Push daily  vancomycin    Solution 125 milliGRAM(s) Oral every 6 hours      MEDICATIONS  (PRN):  HYDROmorphone  Injectable 1 milliGRAM(s) IV Push every 4 hours PRN Moderate Pain (4 - 6)  ondansetron Injectable 4 milliGRAM(s) IV Push every 6 hours PRN Nausea      Physical Exam    Neuro :  no focal deficits  Respiratory: CTA B/L  CV: RRR, S1S2, no murmurs,   Abdominal: Soft, mild RLQ tender to deep palp, ND +BS,  Extremities: No edema, + peripheral pulses        ASSESSMENT    abd pain likely 2nd to cecal mass,   right hepatic lobe metastasis  partial sbo,   diarrhea,   r/o c diff,   h/o gall bladder adenocarcinoma,   Hypothyroidism  HTN (hypertension)  Hypercholesterolemia  S/P flash-bso        PLAN    npo,   d/w ir   cecal lesion is a mass, no signs of fluid collection for drainage   gi cons for possible bx   cea decreased from 122 (10/31/2020) to 3.9 (12/26/21)   ca 19-9 noted    surg onc f/u  pt for transfer to [facility] for further surgical mgmt with dr Anna   f/u stool studies   f/u stool c diff   ivf  heme onc f/u   Patient currently on active treatment gemcitabine + oxaliplatin. Last treatment December 13th.  No chemotherapy while inpatient. On discharge, to follow-up with Dr. Kyle.  Surgery has been consulted; recommending transfer to [facility]. Our team will continue to follow her there should she be transferred.  pt may require surgical intervention.   C. diff pending.   cont zofran  cont pain control  pt for transfer to [facility] for further mgmt  pt accepted to dr nargis mayer's service  consulting surgeon  dr carie anna.  cont current meds       Patient is a 61y old  Female who presents with a chief complaint of vomiting and diarrhea (28 Dec 2021 11:26)    pt seen in ed [  ], reg med floor [ x  ], bed [ x ], chair at bedside [   ], a+o x3 [ x ], lethargic [  ],    nad [ x ]        Allergies    No Known Allergies        Vitals    T(F): 98 (12-29-21 @ 05:43), Max: 98 (12-28-21 @ 20:25)  HR: 83 (12-29-21 @ 05:43) (81 - 88)  BP: 100/64 (12-29-21 @ 05:43) (100/64 - 118/62)  RR: 17 (12-29-21 @ 05:43) (16 - 18)  SpO2: 97% (12-29-21 @ 05:43) (97% - 99%)  Wt(kg): --  CAPILLARY BLOOD GLUCOSE          Labs      Clostridium difficile Toxin by PCR (12.28.21 @ 03:28)   C Diff by PCR Result: Detected       Radiology Results      Meds    MEDICATIONS  (STANDING):  dextrose 5% + sodium chloride 0.45%. 1000 milliLiter(s) (100 mL/Hr) IV Continuous <Continuous>  heparin   Injectable 5000 Unit(s) SubCutaneous every 8 hours  levothyroxine Injectable 37.5 MICROGram(s) IV Push at bedtime  pantoprazole  Injectable 40 milliGRAM(s) IV Push daily  vancomycin    Solution 125 milliGRAM(s) Oral every 6 hours      MEDICATIONS  (PRN):  HYDROmorphone  Injectable 1 milliGRAM(s) IV Push every 4 hours PRN Moderate Pain (4 - 6)  ondansetron Injectable 4 milliGRAM(s) IV Push every 6 hours PRN Nausea      Physical Exam    Neuro :  no focal deficits  Respiratory: CTA B/L  CV: RRR, S1S2, no murmurs,   Abdominal: Soft, mild RLQ tender to deep palp, ND +BS,  Extremities: No edema, + peripheral pulses        ASSESSMENT    abd pain likely 2nd to cecal mass,   right hepatic lobe metastasis  partial sbo,   diarrhea 2nd to c diff,   h/o gall bladder adenocarcinoma,   Hypothyroidism  HTN (hypertension)  Hypercholesterolemia  S/P flash-bso        PLAN    npo,   d/w ir cecal lesion is a mass, no signs of fluid collection for drainage   gi cons for possible bx   cea decreased from 122 (10/31/2020) to 3.9 (12/26/21)   ca 19-9 noted    surg onc f/u  pt for transfer to [facility] for further surgical mgmt with dr Anna pending bed availability   stool c diff positive noted above   f/u xray abd  start trial of clear liquid diet   ivf  heme onc f/u   Patient currently on active treatment gemcitabine + oxaliplatin. Last treatment December 13th.  No chemotherapy while inpatient. On discharge, to follow-up with Dr. Kyle.  Surgery has been consulted; recommending transfer to [facility]. Our team will continue to follow her there should she be transferred.  pt may require surgical intervention.   cont zofran  cont pain control  pt for transfer to [facility] for further mgmt  pt accepted to dr nargis mayer's service  consulting surgeon  dr carie anna.  cont current meds

## 2021-12-29 NOTE — DIETITIAN INITIAL EVALUATION ADULT. - OTHER INFO
Patient from home lives with family, +C. diff, on contact precaution, in isolation. Visited pt. alert, out of bed in chair, able to speak English, states having diarrhea for 1week with "some vomiting", also was able to consume & tolerate meals with liquids at home, follows up with MD/oncologist every 2 wks for chemo. treatment. Presently pt. NPO with IV fluids, stated still with diarrhea overnight noted, on IV abx., followed by surgery/Oncologist, provided food preferences, pending transfer to [facility] for surgical intervention, d/w RN, skin intact, no edema.

## 2021-12-29 NOTE — PROGRESS NOTE ADULT - SUBJECTIVE AND OBJECTIVE BOX
CHIEF COMPLAINT  Diarrhea    HISTORY OF PRESENT ILLNESS  INES DOCKERY is a 61y Female who presents with a chief complaint of diarrhea.    No acute events. No complaints.    REVIEW OF SYSTEMS  A complete review of systems was performed; negative except per HPI    PHYSICAL EXAM  T(C): 36.7 (12-29-21 @ 05:43), Max: 36.7 (12-28-21 @ 20:25)  HR: 83 (12-29-21 @ 05:43) (81 - 88)  BP: 100/64 (12-29-21 @ 05:43) (100/64 - 118/62)  RR: 17 (12-29-21 @ 05:43) (16 - 18)  SpO2: 97% (12-29-21 @ 05:43) (97% - 99%)  Constitutional: alert, awake, in no acute distress  Eyes: PERRL, EOMI  HEENT: normocephalic, atraumatic  Neck: supple, non-tender  Cardiovascular: normal perfusion, no peripheral edema  Respiratory: normal respiratory efforts; no increased use of accessory muscles  Gastrointestinal: soft, non-tender  Musculoskeletal: normal range of motion, no deformities noted  Neurological: alert, CN II to XI grossly intact  Skin: warm, dry    LABORATORY DATA                        10.3   7.16  )-----------( 268      ( 29 Dec 2021 07:47 )             32.4     12-29    144  |  110<H>  |  5<L>  ----------------------------<  89  3.6   |  28  |  x     Ca    8.7      29 Dec 2021 07:47    TPro  x   /  Alb  2.1<L>  /  TBili  x   /  DBili  x   /  AST  x   /  ALT  x   /  AlkPhos  x   12-29

## 2021-12-30 RX ADMIN — HYDROMORPHONE HYDROCHLORIDE 1 MILLIGRAM(S): 2 INJECTION INTRAMUSCULAR; INTRAVENOUS; SUBCUTANEOUS at 22:03

## 2021-12-30 RX ADMIN — PANTOPRAZOLE SODIUM 40 MILLIGRAM(S): 20 TABLET, DELAYED RELEASE ORAL at 13:11

## 2021-12-30 RX ADMIN — HEPARIN SODIUM 5000 UNIT(S): 5000 INJECTION INTRAVENOUS; SUBCUTANEOUS at 21:33

## 2021-12-30 RX ADMIN — HYDROMORPHONE HYDROCHLORIDE 1 MILLIGRAM(S): 2 INJECTION INTRAMUSCULAR; INTRAVENOUS; SUBCUTANEOUS at 21:33

## 2021-12-30 RX ADMIN — Medication 125 MILLIGRAM(S): at 05:21

## 2021-12-30 RX ADMIN — HEPARIN SODIUM 5000 UNIT(S): 5000 INJECTION INTRAVENOUS; SUBCUTANEOUS at 13:11

## 2021-12-30 RX ADMIN — HEPARIN SODIUM 5000 UNIT(S): 5000 INJECTION INTRAVENOUS; SUBCUTANEOUS at 05:21

## 2021-12-30 RX ADMIN — Medication 125 MILLIGRAM(S): at 13:11

## 2021-12-30 RX ADMIN — Medication 37.5 MICROGRAM(S): at 21:33

## 2021-12-30 RX ADMIN — SODIUM CHLORIDE 100 MILLILITER(S): 9 INJECTION, SOLUTION INTRAVENOUS at 18:41

## 2021-12-30 RX ADMIN — Medication 125 MILLIGRAM(S): at 18:42

## 2021-12-30 RX ADMIN — Medication 125 MILLIGRAM(S): at 23:52

## 2021-12-30 NOTE — PROGRESS NOTE ADULT - SUBJECTIVE AND OBJECTIVE BOX
Patient is a 61y old  Female who presents with a chief complaint of vomiting and diarrhea (29 Dec 2021 12:35)    pt seen in ed [  ], reg med floor [ x  ], bed [ x ], chair at bedside [   ], a+o x3 [ x ], lethargic [  ],    nad [ x ]        Allergies    No Known Allergies        Vitals    T(F): 98.2 (12-30-21 @ 04:58), Max: 98.7 (12-29-21 @ 13:48)  HR: 85 (12-30-21 @ 04:58) (80 - 85)  BP: 104/68 (12-30-21 @ 04:58) (104/68 - 113/74)  RR: 19 (12-30-21 @ 04:58) (17 - 19)  SpO2: 95% (12-30-21 @ 04:58) (95% - 96%)  Wt(kg): --  CAPILLARY BLOOD GLUCOSE          Labs                          10.3   7.16  )-----------( 268      ( 29 Dec 2021 07:47 )             32.4       12-29    144  |  110<H>  |  5<L>  ----------------------------<  89  3.6   |  28  |  0.55    Ca    8.7      29 Dec 2021 07:47    TPro  5.9<L>  /  Alb  2.1<L>  /  TBili  0.2  /  DBili  x   /  AST  26  /  ALT  42  /  AlkPhos  108  12-29            Clean Catch Clean Catch (Midstream)  12-28 @ 03:53   <10,000 CFU/mL Normal Urogenital Liv  --  --          Radiology Results      Meds    MEDICATIONS  (STANDING):  dextrose 5% + sodium chloride 0.45%. 1000 milliLiter(s) (100 mL/Hr) IV Continuous <Continuous>  heparin   Injectable 5000 Unit(s) SubCutaneous every 8 hours  levothyroxine Injectable 37.5 MICROGram(s) IV Push at bedtime  pantoprazole  Injectable 40 milliGRAM(s) IV Push daily  vancomycin    Solution 125 milliGRAM(s) Oral every 6 hours      MEDICATIONS  (PRN):  HYDROmorphone  Injectable 1 milliGRAM(s) IV Push every 4 hours PRN Moderate Pain (4 - 6)  ondansetron Injectable 4 milliGRAM(s) IV Push every 6 hours PRN Nausea      Physical Exam    Neuro :  no focal deficits  Respiratory: CTA B/L  CV: RRR, S1S2, no murmurs,   Abdominal: Soft, mild RLQ tender to deep palp, ND +BS,  Extremities: No edema, + peripheral pulses        ASSESSMENT    abd pain likely 2nd to cecal mass,   right hepatic lobe metastasis  partial sbo,   diarrhea 2nd to c diff,   h/o gall bladder adenocarcinoma,   Hypothyroidism  HTN (hypertension)  Hypercholesterolemia  S/P flash-bso        PLAN    npo,   d/w ir cecal lesion is a mass, no signs of fluid collection for drainage   gi cons for possible bx   cea decreased from 122 (10/31/2020) to 3.9 (12/26/21)   ca 19-9 noted    surg onc f/u  pt for transfer to [facility] for further surgical mgmt with dr Anna pending bed availability   stool c diff positive noted above   f/u xray abd  start trial of clear liquid diet   ivf  heme onc f/u   Patient currently on active treatment gemcitabine + oxaliplatin. Last treatment December 13th.  No chemotherapy while inpatient. On discharge, to follow-up with Dr. Kyle.  Surgery has been consulted; recommending transfer to [facility]. Our team will continue to follow her there should she be transferred.  pt may require surgical intervention.   cont zofran  cont pain control  pt for transfer to [facility] for further mgmt  pt accepted to dr nargis mayer's service  consulting surgeon  dr carie anna.  cont current meds         Patient is a 61y old  Female who presents with a chief complaint of vomiting and diarrhea (29 Dec 2021 12:35)    pt seen in ed [  ], reg med floor [ x  ], bed [ x ], chair at bedside [   ], a+o x3 [ x ], lethargic [  ],    nad [ x ]        Allergies    No Known Allergies        Vitals    T(F): 98.2 (12-30-21 @ 04:58), Max: 98.7 (12-29-21 @ 13:48)  HR: 85 (12-30-21 @ 04:58) (80 - 85)  BP: 104/68 (12-30-21 @ 04:58) (104/68 - 113/74)  RR: 19 (12-30-21 @ 04:58) (17 - 19)  SpO2: 95% (12-30-21 @ 04:58) (95% - 96%)  Wt(kg): --  CAPILLARY BLOOD GLUCOSE          Labs                          10.3   7.16  )-----------( 268      ( 29 Dec 2021 07:47 )             32.4       12-29    144  |  110<H>  |  5<L>  ----------------------------<  89  3.6   |  28  |  0.55    Ca    8.7      29 Dec 2021 07:47    TPro  5.9<L>  /  Alb  2.1<L>  /  TBili  0.2  /  DBili  x   /  AST  26  /  ALT  42  /  AlkPhos  108  12-29            Clean Catch Clean Catch (Midstream)  12-28 @ 03:53   <10,000 CFU/mL Normal Urogenital Liv  --  --          Radiology Results    < from: Xray Abdomen 1 View PORTABLE -Routine (Xray Abdomen 1 View PORTABLE -Routine .) (12.29.21 @ 10:45) >  There are persistent air-filled dilated central loops of small bowel.  Some air is noted within the left colon.    Evaluation for free intraperitoneal air is limited on this exam.    Surgical clips are present in the right upper quadrant abdomen.    < end of copied text >      Meds    MEDICATIONS  (STANDING):  dextrose 5% + sodium chloride 0.45%. 1000 milliLiter(s) (100 mL/Hr) IV Continuous <Continuous>  heparin   Injectable 5000 Unit(s) SubCutaneous every 8 hours  levothyroxine Injectable 37.5 MICROGram(s) IV Push at bedtime  pantoprazole  Injectable 40 milliGRAM(s) IV Push daily  vancomycin    Solution 125 milliGRAM(s) Oral every 6 hours      MEDICATIONS  (PRN):  HYDROmorphone  Injectable 1 milliGRAM(s) IV Push every 4 hours PRN Moderate Pain (4 - 6)  ondansetron Injectable 4 milliGRAM(s) IV Push every 6 hours PRN Nausea      Physical Exam    Neuro :  no focal deficits  Respiratory: CTA B/L  CV: RRR, S1S2, no murmurs,   Abdominal: Soft, mild RLQ tender to deep palp, ND +BS,  Extremities: No edema, + peripheral pulses        ASSESSMENT    abd pain likely 2nd to cecal mass,   right hepatic lobe metastasis  partial sbo,   diarrhea 2nd to c diff,   h/o gall bladder adenocarcinoma,   Hypothyroidism  HTN (hypertension)  Hypercholesterolemia  S/P flash-bso        PLAN    npo,   d/w ir cecal lesion is a mass, no signs of fluid collection for drainage   gi cons for possible bx   cea decreased from 122 (10/31/2020) to 3.9 (12/26/21)   ca 19-9 noted    surg onc f/u  pt for transfer to [facility] for further surgical mgmt with dr nAna pending bed availability   stool c diff positive noted   cont vanco po   xray abd with here are persistent air-filled dilated central loops of small bowel. Some air is noted within the left colon. Evaluation for free intraperitoneal air is limited on this exam.  Surgical clips are present in the right upper quadrant abdomen noted above.  unable to start po diet given ab xray findings  cont ivf  heme onc f/u   Patient currently on active treatment gemcitabine + oxaliplatin. Last treatment December 13th.  No chemotherapy while inpatient. On discharge, to follow-up with Dr. Kyle.  Surgery has been consulted; recommending transfer to [facility]. Our team will continue to follow her there should she be transferred.  pt may require surgical intervention.   cont zofran  cont pain control  pt for transfer to [facility] for further mgmt pending available bed   pt accepted to dr nargis mayer's service  consulting surgeon  dr carie anna.  cont current meds

## 2021-12-30 NOTE — PROGRESS NOTE ADULT - SUBJECTIVE AND OBJECTIVE BOX
NP Note discussed with  Primary Attending    Patient is a 61y old  Female who presents with a chief complaint of vomiting and diarrhea (30 Dec 2021 06:29)    HPI  60 y/o female, ibetan speaking with PMHx of C. diff, gall bladder adenocarcinoma, Hypothyroidism presents to the ED for worsening diarrhea and vomiting x2 days. Patient is currently undergoing chemotherapy every 2 weeks with Dr. Kyle. Last chemo 12/13/21. Patient's daughter states pt has been having dull, intermittent abdominal pain for the past 2 weeks. Admitted to medicine for further management of colonic mass with small bowel obstruction.     CT A/P (12/26): Large necrotic cecal tumor with suspected synchronous involvement of the   ascending colon and resultant diffuse small bowel obstruction. Thickening   and enhancement of the terminal ileum is nonspecific and may reflect   local spread versus bowel ischemia. Lactate level correlation is   recommended. Necrotic lymphadenopathy.  Hypodense right hepatic lobe lesion concerning for metastasis.    Pt remains NPO, on IVF. Awaiting transfer to [facility] for surgical intervention by Dr. Vicente Victor.     INTERVAL HPI/OVERNIGHT EVENTS: Pt seen and examined this morning. Pt speaks Tibetin translated by daughter Stu per pt request. Pt endorses BMs x 3 , loose. Pt denies N/V/abdominal discomfort.     MEDICATIONS  (STANDING):  dextrose 5% + sodium chloride 0.45%. 1000 milliLiter(s) (100 mL/Hr) IV Continuous <Continuous>  heparin   Injectable 5000 Unit(s) SubCutaneous every 8 hours  levothyroxine Injectable 37.5 MICROGram(s) IV Push at bedtime  pantoprazole  Injectable 40 milliGRAM(s) IV Push daily  vancomycin    Solution 125 milliGRAM(s) Oral every 6 hours    MEDICATIONS  (PRN):  HYDROmorphone  Injectable 1 milliGRAM(s) IV Push every 4 hours PRN Moderate Pain (4 - 6)  ondansetron Injectable 4 milliGRAM(s) IV Push every 6 hours PRN Nausea      __________________________________________________  REVIEW OF SYSTEMS:    CONSTITUTIONAL: No fever,   EYES: no acute visual disturbances  NECK: No pain or stiffness  RESPIRATORY: No cough; No shortness of breath  CARDIOVASCULAR: No chest pain, no palpitations  GASTROINTESTINAL: No pain. No nausea or vomiting; loose BM's x 3   NEUROLOGICAL: No headache or numbness, no tremors  MUSCULOSKELETAL: No joint pain, no muscle pain  GENITOURINARY: no dysuria, no frequency, no hesitancy  PSYCHIATRY: no depression , no anxiety  ALL OTHER  ROS negative        Vital Signs Last 24 Hrs  T(C): 36.7 (30 Dec 2021 13:14), Max: 36.9 (29 Dec 2021 20:52)  T(F): 98.1 (30 Dec 2021 13:14), Max: 98.4 (29 Dec 2021 20:52)  HR: 83 (30 Dec 2021 13:14) (80 - 85)  BP: 102/60 (30 Dec 2021 13:14) (102/60 - 113/74)  BP(mean): --  RR: 18 (30 Dec 2021 13:14) (18 - 19)  SpO2: 97% (30 Dec 2021 13:14) (95% - 97%)    ________________________________________________  PHYSICAL EXAM:  GENERAL: NAD  HEENT: Normocephalic;  conjunctivae and sclerae clear; moist mucous membranes;   NECK : supple  CHEST/LUNG: Clear to auscultation bilaterally with good air entry   HEART: S1 S2  regular; no murmurs, gallops or rubs  ABDOMEN: Soft, Nontender, Nondistended; Bowel sounds present  EXTREMITIES: no cyanosis; no edema; no calf tenderness  SKIN: warm and dry; no rash  NERVOUS SYSTEM:  Awake and alert; Oriented  to place, person and time ; no new deficits    _________________________________________________  LABS:                        10.3   7.16  )-----------( 268      ( 29 Dec 2021 07:47 )             32.4     12-29    144  |  110<H>  |  5<L>  ----------------------------<  89  3.6   |  28  |  0.55    Ca    8.7      29 Dec 2021 07:47    TPro  5.9<L>  /  Alb  2.1<L>  /  TBili  0.2  /  DBili  x   /  AST  26  /  ALT  42  /  AlkPhos  108  12-29        CAPILLARY BLOOD GLUCOSE      COVID-19 PCR: NotDetec (26 Dec 2021 08:51)    RADIOLOGY & ADDITIONAL TESTS:  < from: Xray Abdomen 1 View PORTABLE -Routine (Xray Abdomen 1 View PORTABLE -Routine .) (12.29.21 @ 10:45) >  There are persistent air-filled dilated central loops of small bowel.  Some air is noted within the left colon.    Evaluation for free intraperitoneal air is limited on this exam.    Surgical clips are present in the right upper quadrant abdomen.    IMPRESSION:    Findings as discussed above.    < end of copied text >  < from: CT Abdomen and Pelvis w/ IV Cont (12.26.21 @ 10:36) >  IMPRESSION:  Large necrotic cecal tumor with suspected synchronous involvement of the   ascending colon and resultant diffuse small bowel obstruction. Thickening   and enhancement of the terminal ileum is nonspecific and may reflect   local spread versus bowel ischemia. Lactate level correlation is   recommended. Necrotic lymphadenopathy.    Hypodense right hepatic lobe lesion concerning for metastasis.    < end of copied text >      Imaging Personally Reviewed:  YES/NO        Consultant(s) Notes Reviewed:   YES/ No    Care Discussed with Consultants :     Plan of care was discussed with patient and /or primary care giver; all questions and concerns were addressed and care was aligned with patient's wishes.

## 2021-12-31 RX ORDER — HYDROMORPHONE HYDROCHLORIDE 2 MG/ML
1 INJECTION INTRAMUSCULAR; INTRAVENOUS; SUBCUTANEOUS EVERY 4 HOURS
Refills: 0 | Status: DISCONTINUED | OUTPATIENT
Start: 2021-12-31 | End: 2022-01-03

## 2021-12-31 RX ORDER — SODIUM CHLORIDE 9 MG/ML
1000 INJECTION, SOLUTION INTRAVENOUS
Refills: 0 | Status: DISCONTINUED | OUTPATIENT
Start: 2021-12-31 | End: 2022-01-01

## 2021-12-31 RX ADMIN — Medication 125 MILLIGRAM(S): at 11:30

## 2021-12-31 RX ADMIN — Medication 125 MILLIGRAM(S): at 17:57

## 2021-12-31 RX ADMIN — Medication 125 MILLIGRAM(S): at 23:02

## 2021-12-31 RX ADMIN — SODIUM CHLORIDE 100 MILLILITER(S): 9 INJECTION, SOLUTION INTRAVENOUS at 23:03

## 2021-12-31 RX ADMIN — HEPARIN SODIUM 5000 UNIT(S): 5000 INJECTION INTRAVENOUS; SUBCUTANEOUS at 05:16

## 2021-12-31 RX ADMIN — Medication 125 MILLIGRAM(S): at 05:15

## 2021-12-31 RX ADMIN — Medication 37.5 MICROGRAM(S): at 23:02

## 2021-12-31 RX ADMIN — HEPARIN SODIUM 5000 UNIT(S): 5000 INJECTION INTRAVENOUS; SUBCUTANEOUS at 14:38

## 2021-12-31 RX ADMIN — HEPARIN SODIUM 5000 UNIT(S): 5000 INJECTION INTRAVENOUS; SUBCUTANEOUS at 23:02

## 2021-12-31 RX ADMIN — PANTOPRAZOLE SODIUM 40 MILLIGRAM(S): 20 TABLET, DELAYED RELEASE ORAL at 11:30

## 2021-12-31 NOTE — PROGRESS NOTE ADULT - PROBLEM SELECTOR PLAN 3
-on chemo (last 12/13)  -Oncologist Dr. Kyle following

## 2021-12-31 NOTE — PROGRESS NOTE ADULT - SUBJECTIVE AND OBJECTIVE BOX
NP Note discussed with  Primary Attending    Patient is a 61y old  Female who presents with a chief complaint of vomiting and diarrhea (31 Dec 2021 06:36)      INTERVAL HPI/OVERNIGHT EVENTS: no new complaints    MEDICATIONS  (STANDING):  heparin   Injectable 5000 Unit(s) SubCutaneous every 8 hours  levothyroxine Injectable 37.5 MICROGram(s) IV Push at bedtime  pantoprazole  Injectable 40 milliGRAM(s) IV Push daily  vancomycin    Solution 125 milliGRAM(s) Oral every 6 hours    MEDICATIONS  (PRN):  HYDROmorphone  Injectable 1 milliGRAM(s) IV Push every 4 hours PRN Moderate Pain (4 - 6)  ondansetron Injectable 4 milliGRAM(s) IV Push every 6 hours PRN Nausea      __________________________________________________  REVIEW OF SYSTEMS:    CONSTITUTIONAL: No fever,   EYES: no acute visual disturbances  NECK: No pain or stiffness  RESPIRATORY: No cough; No shortness of breath  CARDIOVASCULAR: No chest pain, no palpitations  GASTROINTESTINAL: No pain. No nausea or vomiting; No diarrhea   NEUROLOGICAL: No headache or numbness, no tremors  MUSCULOSKELETAL: No joint pain, no muscle pain  GENITOURINARY: no dysuria, no frequency, no hesitancy  PSYCHIATRY: no depression , no anxiety  ALL OTHER  ROS negative        Vital Signs Last 24 Hrs  T(C): 36.7 (31 Dec 2021 13:58), Max: 36.9 (30 Dec 2021 20:13)  T(F): 98.1 (31 Dec 2021 13:58), Max: 98.5 (30 Dec 2021 20:13)  HR: 96 (31 Dec 2021 13:58) (80 - 96)  BP: 109/49 (31 Dec 2021 13:58) (107/68 - 109/49)  BP(mean): --  RR: 17 (31 Dec 2021 13:58) (17 - 18)  SpO2: 98% (31 Dec 2021 13:58) (96% - 98%)    ________________________________________________  PHYSICAL EXAM:  well developed, well groomed  GENERAL: NAD  HEENT: Normocephalic;  conjunctivae and sclerae clear; moist mucous membranes;   NECK : supple  CHEST/LUNG: Clear to auscultation bilaterally with good air entry   HEART: S1 S2  regular; no murmurs, gallops or rubs  ABDOMEN: Soft, Nontender, Nondistended; Bowel sounds present  EXTREMITIES: no cyanosis; no edema; no calf tenderness  SKIN: warm and dry; no rash  NERVOUS SYSTEM:  Awake and alert; Oriented  to place, person and time ; no new deficits    _________________________________________________  LABS:      CAPILLARY BLOOD GLUCOSE    RADIOLOGY & ADDITIONAL TESTS:    Xray Abdomen 1 View PORTABLE -Routine (Xray Abdomen 1 View PORTABLE -Routine .) (12.29.21 @ 10:45) >    ACC: [id number] EXAM:  XR ABDOMEN PORTABLE ROUTINE 1V                          PROCEDURE DATE:  12/29/2021          INTERPRETATION:  Clinical information: Patient admission with abdominal   pain and vomiting and small bowel obstruction on CT scan.    Abdomen, supine view.    COMPARISON: CT scan abdomen pelvis 12/26/2021.    There are persistent air-filled dilated central loops of small bowel.  Some air is noted within the left colon.    Evaluation for free intraperitoneal air is limited on this exam.    Surgical clips are present in the right upper quadrant abdomen.    IMPRESSION:    Findings as discussed above.    < end of copied text >    CT Abdomen and Pelvis w/ IV Cont (12.26.21 @ 10:36) >    ACC: [id number] EXAM:  CT ABDOMEN AND PELVIS IC                          PROCEDURE DATE:  12/26/2021          INTERPRETATION:  CLINICAL INFORMATION: Diffuse abdominal pain, vomiting   and diarrhea. History of gallbladder cancer. On chemotherapy.    COMPARISON: CT abdomen pelvis 6/4/2021    CONTRAST/COMPLICATIONS:  IV Contrast: Omnipaque 350  90 cc administered   10 cc discarded  Oral Contrast: NONE  Complications: None reported at time of study completion    PROCEDURE:  CT of the Abdomen and Pelvis was performed.  Sagittal and coronal reformats were performed.    FINDINGS:  LOWER CHEST: Central venous catheter tip in the right atrium.    LIVER: A new 8 mm inferior right hepatic lobe hypodense lesion..  BILE DUCTS: Normal caliber.  GALLBLADDER: Cholecystectomy.  SPLEEN: Within normal limits.  PANCREAS: Within normal limits.  ADRENALS: Within normal limits.  KIDNEYS/URETERS: Within normal limits.    BLADDER: Minimally distended.  REPRODUCTIVE ORGANS: Hysterectomy.    BOWEL: Large necrotic cecal mass with heterogeneous masslike thickening   of the ascending colon to the level of the hepatic flexure. The cecal   mass obstructs the ileocecal valve resulting in diffuse small bowel   obstruction. A very small segment of ileocolic intussusception is   suggested (4:47 and 2:69). Thickening and hyperemia of the terminal ileum.  PERITONEUM: Trace ascites.  VESSELS: Within normal limits.  RETROPERITONEUM/LYMPH NODES: Right-sided pericolonic lymphadenopathy.   Necrotic lymph nodes along the SMA measure 29 x 19 mm and 18 x 18 mm.  ABDOMINAL WALL: Within normal limits.  BONES: Degenerative changes.    IMPRESSION:  Large necrotic cecal tumor with suspected synchronous involvement of the   ascending colon and resultant diffuse small bowel obstruction. Thickening   and enhancement of the terminal ileum is nonspecific and may reflect   local spread versus bowel ischemia. Lactate level correlation is   recommended. Necrotic lymphadenopathy.    Hypodense right hepatic lobe lesion concerning for metastasis.    < end of copied text >      Imaging Personally Reviewed:  YES/NO    Consultant(s) Notes Reviewed:   YES/ No    Care Discussed with Consultants :     Plan of care was discussed with patient and /or primary care giver; all questions and concerns were addressed and care was aligned with patient's wishes.

## 2021-12-31 NOTE — PROGRESS NOTE ADULT - PROBLEM SELECTOR PLAN 4
-continue synthroid
-continue synthroid
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 Kyle  -monitor CBC

## 2021-12-31 NOTE — PROGRESS NOTE ADULT - PROBLEM SELECTOR PLAN 1
CT A/P: Large necrotic cecal tumor with suspected synchronous involvement of the   ascending colon and resultant diffuse small bowel obstruction. Concern for metastasis.  -surgical oncology consulted, may need surgical intervention at [facility] with Dr. Anna, pending bed availability  -NPO for now, c/w IVF.
CT A/P: Large necrotic cecal tumor with suspected synchronous involvement of the   ascending colon and resultant diffuse small bowel obstruction. Concern for metastasis.  -surgical oncology consulted, may need surgical intervention at [facility] with Dr. Anna
CT A/P: Large necrotic cecal tumor with suspected synchronous involvement of the   ascending colon and resultant diffuse small bowel obstruction. Concern for metastasis.  -surgical oncology consulted, may need surgical intervention at [facility] with Dr. Anna, pending bed availability.   -NPO for now, c/w IVF.  Repeat Xray as above , persistent air-filled dilated central loops of small bowel.  f/u with [facility] (no bed yet available).
CT A/P: Large necrotic cecal tumor with suspected synchronous involvement of the   ascending colon and resultant diffuse small bowel obstruction. Concern for metastasis.  -surgical oncology consulted, may need surgical intervention at [facility] with Dr. Anna, pending bed availability  -NPO for now, c/w IVF.  -repeat Abd Xray to see if sob resolving, if improving w/o obstruction, will start diet.
CT A/P: Large necrotic cecal tumor with suspected synchronous involvement of the   ascending colon and resultant diffuse small bowel obstruction. Concern for metastasis.  -surgical oncology consulted, may need surgical intervention at [facility] with Dr. Anna, pending bed availability.   -NPO for now, c/w IVF.  -Repeat Xray as above , persistent air-filled dilated central loops of small bowel.  -f/u with [facility] (no bed yet available).  -f/u bowel series for possible resumption of PO diet

## 2021-12-31 NOTE — PROGRESS NOTE ADULT - PROBLEM SELECTOR PROBLEM 6
Prophylactic measure
Discharge planning issues
Prophylactic measure
Prophylactic measure
Discharge planning issues

## 2021-12-31 NOTE — PROGRESS NOTE ADULT - PROBLEM SELECTOR PLAN 6
-DVT: on heparin SQ  -GI: on protonix
-DVT: on heparin SQ  -GI: on protonix
-pending transfer to [facility]  -case management consult
-DVT: on heparin SQ  -GI: on protonix
-pending transfer to [facility]  -case management consult

## 2021-12-31 NOTE — PROGRESS NOTE ADULT - PROBLEM SELECTOR PROBLEM 3
Carcinoma of gall bladder

## 2021-12-31 NOTE — PROGRESS NOTE ADULT - PROBLEM SELECTOR PLAN 5
-DVT: on heparin SQ  -GI: on protonix
-continue synthroid
-DVT: on heparin SQ  -GI: on protonix
-continue synthroid
-continue synthroid

## 2021-12-31 NOTE — PROGRESS NOTE ADULT - PROBLEM SELECTOR PLAN 2
still has diarrhea  -f/u c. diff  -continue fluids  -monitor BNP
p/w abdominal pain, N/V, diarrhea x few days  -had 3 loose bms last night   -C diff Positive  -stool count  -continue fluids  -c/w Vancomycin po
hx C difff   p/w abdominal pain, N/V, diarrhea x few days  -C diff Positive  -stool count  -continue fluids  -c/w Vancomycin po  -monitor BNP
still has diarrhea  -f/u c. diff-sent 12/27   -stool count  -continue fluids  -monitor BNP
hx C difff   p/w abdominal pain, N/V, diarrhea x few days  -C diff Positive  -stool count  -continue fluids  -c/w Vancomycin po

## 2021-12-31 NOTE — PROGRESS NOTE ADULT - SUBJECTIVE AND OBJECTIVE BOX
Patient is a 61y old  Female who presents with a chief complaint of vomiting and diarrhea (30 Dec 2021 16:22)    pt seen in ed [  ], reg med floor [ x  ], bed [ x ], chair at bedside [   ], a+o x3 [ x ], lethargic [  ],    nad [ x ]        Allergies    No Known Allergies        Vitals    T(F): 97.8 (12-31-21 @ 05:10), Max: 98.5 (12-30-21 @ 20:13)  HR: 80 (12-31-21 @ 05:10) (80 - 91)  BP: 107/68 (12-31-21 @ 05:10) (102/60 - 108/58)  RR: 18 (12-31-21 @ 05:10) (18 - 18)  SpO2: 96% (12-31-21 @ 05:10) (96% - 98%)  Wt(kg): --  CAPILLARY BLOOD GLUCOSE          Labs                          10.3   7.16  )-----------( 268      ( 29 Dec 2021 07:47 )             32.4       12-29    144  |  110<H>  |  5<L>  ----------------------------<  89  3.6   |  28  |  0.55    Ca    8.7      29 Dec 2021 07:47    TPro  5.9<L>  /  Alb  2.1<L>  /  TBili  0.2  /  DBili  x   /  AST  26  /  ALT  42  /  AlkPhos  108  12-29            Clean Catch Clean Catch (Midstream)  12-28 @ 03:53   <10,000 CFU/mL Normal Urogenital Liv  --  --          Radiology Results      Meds    MEDICATIONS  (STANDING):  heparin   Injectable 5000 Unit(s) SubCutaneous every 8 hours  levothyroxine Injectable 37.5 MICROGram(s) IV Push at bedtime  pantoprazole  Injectable 40 milliGRAM(s) IV Push daily  vancomycin    Solution 125 milliGRAM(s) Oral every 6 hours      MEDICATIONS  (PRN):  HYDROmorphone  Injectable 1 milliGRAM(s) IV Push every 4 hours PRN Moderate Pain (4 - 6)  ondansetron Injectable 4 milliGRAM(s) IV Push every 6 hours PRN Nausea      Physical Exam    Neuro :  no focal deficits  Respiratory: CTA B/L  CV: RRR, S1S2, no murmurs,   Abdominal: Soft, mild RLQ tender to deep palp, ND +BS,  Extremities: No edema, + peripheral pulses        ASSESSMENT    abd pain likely 2nd to cecal mass,   right hepatic lobe metastasis  partial sbo,   diarrhea 2nd to c diff,   h/o gall bladder adenocarcinoma,   Hypothyroidism  HTN (hypertension)  Hypercholesterolemia  S/P flash-bso        PLAN    npo,   d/w ir cecal lesion is a mass, no signs of fluid collection for drainage   gi cons for possible bx   cea decreased from 122 (10/31/2020) to 3.9 (12/26/21)   ca 19-9 noted    surg onc f/u  pt for transfer to [facility] for further surgical mgmt with dr Anna pending bed availability   stool c diff positive noted   cont vanco po   xray abd with here are persistent air-filled dilated central loops of small bowel. Some air is noted within the left colon. Evaluation for free intraperitoneal air is limited on this exam.  Surgical clips are present in the right upper quadrant abdomen noted above.  unable to start po diet given ab xray findings  cont ivf  heme onc f/u   Patient currently on active treatment gemcitabine + oxaliplatin. Last treatment December 13th.  No chemotherapy while inpatient. On discharge, to follow-up with Dr. Kyle.  Surgery has been consulted; recommending transfer to [facility]. Our team will continue to follow her there should she be transferred.  pt may require surgical intervention.   cont zofran  cont pain control  pt for transfer to [facility] for further mgmt pending available bed   pt accepted to dr nargis mayer's service  consulting surgeon  dr carie anna.  cont current meds       Patient is a 61y old  Female who presents with a chief complaint of vomiting and diarrhea (30 Dec 2021 16:22)    pt seen in ed [  ], reg med floor [ x  ], bed [ x ], chair at bedside [   ], a+o x3 [ x ], lethargic [  ],    nad [ x ]    still awaiting bed for transfer      Allergies    No Known Allergies        Vitals    T(F): 97.8 (12-31-21 @ 05:10), Max: 98.5 (12-30-21 @ 20:13)  HR: 80 (12-31-21 @ 05:10) (80 - 91)  BP: 107/68 (12-31-21 @ 05:10) (102/60 - 108/58)  RR: 18 (12-31-21 @ 05:10) (18 - 18)  SpO2: 96% (12-31-21 @ 05:10) (96% - 98%)  Wt(kg): --  CAPILLARY BLOOD GLUCOSE          Labs                          10.3   7.16  )-----------( 268      ( 29 Dec 2021 07:47 )             32.4       12-29    144  |  110<H>  |  5<L>  ----------------------------<  89  3.6   |  28  |  0.55    Ca    8.7      29 Dec 2021 07:47    TPro  5.9<L>  /  Alb  2.1<L>  /  TBili  0.2  /  DBili  x   /  AST  26  /  ALT  42  /  AlkPhos  108  12-29            Clean Catch Clean Catch (Midstream)  12-28 @ 03:53   <10,000 CFU/mL Normal Urogenital Liv  --  --          Radiology Results      Meds    MEDICATIONS  (STANDING):  heparin   Injectable 5000 Unit(s) SubCutaneous every 8 hours  levothyroxine Injectable 37.5 MICROGram(s) IV Push at bedtime  pantoprazole  Injectable 40 milliGRAM(s) IV Push daily  vancomycin    Solution 125 milliGRAM(s) Oral every 6 hours      MEDICATIONS  (PRN):  HYDROmorphone  Injectable 1 milliGRAM(s) IV Push every 4 hours PRN Moderate Pain (4 - 6)  ondansetron Injectable 4 milliGRAM(s) IV Push every 6 hours PRN Nausea      Physical Exam    Neuro :  no focal deficits  Respiratory: CTA B/L  CV: RRR, S1S2, no murmurs,   Abdominal: Soft, mild RLQ tender to deep palp, ND +BS,  Extremities: No edema, + peripheral pulses        ASSESSMENT    abd pain likely 2nd to cecal mass,   right hepatic lobe metastasis  partial sbo,   diarrhea 2nd to c diff,   h/o gall bladder adenocarcinoma,   Hypothyroidism  HTN (hypertension)  Hypercholesterolemia  S/P flash-bso        PLAN    npo,   d/w ir cecal lesion is a mass, no signs of fluid collection for drainage   gi cons for possible bx   cea decreased from 122 (10/31/2020) to 3.9 (12/26/21)   ca 19-9 noted    surg onc f/u  pt for transfer to [facility] for further surgical mgmt with dr Anna pending bed availability   stool c diff positive noted   cont vanco po   xray abd with here are persistent air-filled dilated central loops of small bowel. Some air is noted within the left colon. Evaluation for free intraperitoneal air is limited on this exam.  Surgical clips are present in the right upper quadrant abdomen noted above.  unable to start po diet given ab xray findings   f/u small bowel series  cont ivf  heme onc f/u   Patient currently on active treatment gemcitabine + oxaliplatin. Last treatment December 13th.  No chemotherapy while inpatient. On discharge, to follow-up with Dr. Kyle.  Surgery has been consulted; recommending transfer to [facility]. Our team will continue to follow her there should she be transferred.  pt may require surgical intervention.   cont zofran  cont pain control  pt for transfer to [facility] for further mgmt pending available bed   pt accepted to dr nargis mayer's service  consulting surgeon  dr carie anna.  cont current meds

## 2021-12-31 NOTE — PROGRESS NOTE ADULT - PROBLEM SELECTOR PLAN 7
-pending transfer to [facility]  -case management consult  Plan of care discussed with pt's daughter
-pending transfer to [facility]  -case management consult
-pending transfer to [facility]  -case management consult  Plan of care discussed with pt's daughter

## 2021-12-31 NOTE — PROGRESS NOTE ADULT - PROBLEM SELECTOR PROBLEM 4
Anemia of chronic disease
Anemia of chronic disease
Hypothyroidism
Hypothyroidism
Anemia of chronic disease

## 2022-01-01 LAB
ANION GAP SERPL CALC-SCNC: 8 MMOL/L — SIGNIFICANT CHANGE UP (ref 5–17)
BUN SERPL-MCNC: 5 MG/DL — LOW (ref 7–18)
CALCIUM SERPL-MCNC: 8 MG/DL — LOW (ref 8.4–10.5)
CHLORIDE SERPL-SCNC: 107 MMOL/L — SIGNIFICANT CHANGE UP (ref 96–108)
CO2 SERPL-SCNC: 25 MMOL/L — SIGNIFICANT CHANGE UP (ref 22–31)
CREAT SERPL-MCNC: 0.59 MG/DL — SIGNIFICANT CHANGE UP (ref 0.5–1.3)
GLUCOSE SERPL-MCNC: 102 MG/DL — HIGH (ref 70–99)
HCT VFR BLD CALC: 35.6 % — SIGNIFICANT CHANGE UP (ref 34.5–45)
HGB BLD-MCNC: 11.2 G/DL — LOW (ref 11.5–15.5)
MCHC RBC-ENTMCNC: 27.5 PG — SIGNIFICANT CHANGE UP (ref 27–34)
MCHC RBC-ENTMCNC: 31.5 GM/DL — LOW (ref 32–36)
MCV RBC AUTO: 87.5 FL — SIGNIFICANT CHANGE UP (ref 80–100)
NRBC # BLD: 0 /100 WBCS — SIGNIFICANT CHANGE UP (ref 0–0)
PLATELET # BLD AUTO: 301 K/UL — SIGNIFICANT CHANGE UP (ref 150–400)
POTASSIUM SERPL-MCNC: 3.1 MMOL/L — LOW (ref 3.5–5.3)
POTASSIUM SERPL-SCNC: 3.1 MMOL/L — LOW (ref 3.5–5.3)
RBC # BLD: 4.07 M/UL — SIGNIFICANT CHANGE UP (ref 3.8–5.2)
RBC # FLD: 16.3 % — HIGH (ref 10.3–14.5)
SODIUM SERPL-SCNC: 140 MMOL/L — SIGNIFICANT CHANGE UP (ref 135–145)
WBC # BLD: 10.51 K/UL — HIGH (ref 3.8–10.5)
WBC # FLD AUTO: 10.51 K/UL — HIGH (ref 3.8–10.5)

## 2022-01-01 RX ORDER — SODIUM CHLORIDE 9 MG/ML
1000 INJECTION, SOLUTION INTRAVENOUS
Refills: 0 | Status: DISCONTINUED | OUTPATIENT
Start: 2022-01-01 | End: 2022-01-03

## 2022-01-01 RX ADMIN — ONDANSETRON 4 MILLIGRAM(S): 8 TABLET, FILM COATED ORAL at 20:37

## 2022-01-01 RX ADMIN — Medication 37.5 MICROGRAM(S): at 22:00

## 2022-01-01 RX ADMIN — Medication 125 MILLIGRAM(S): at 11:30

## 2022-01-01 RX ADMIN — Medication 125 MILLIGRAM(S): at 05:49

## 2022-01-01 RX ADMIN — HYDROMORPHONE HYDROCHLORIDE 1 MILLIGRAM(S): 2 INJECTION INTRAMUSCULAR; INTRAVENOUS; SUBCUTANEOUS at 22:00

## 2022-01-01 RX ADMIN — HEPARIN SODIUM 5000 UNIT(S): 5000 INJECTION INTRAVENOUS; SUBCUTANEOUS at 05:49

## 2022-01-01 RX ADMIN — SODIUM CHLORIDE 100 MILLILITER(S): 9 INJECTION, SOLUTION INTRAVENOUS at 23:32

## 2022-01-01 RX ADMIN — PANTOPRAZOLE SODIUM 40 MILLIGRAM(S): 20 TABLET, DELAYED RELEASE ORAL at 11:31

## 2022-01-01 RX ADMIN — HEPARIN SODIUM 5000 UNIT(S): 5000 INJECTION INTRAVENOUS; SUBCUTANEOUS at 22:00

## 2022-01-01 RX ADMIN — HYDROMORPHONE HYDROCHLORIDE 1 MILLIGRAM(S): 2 INJECTION INTRAMUSCULAR; INTRAVENOUS; SUBCUTANEOUS at 20:54

## 2022-01-01 RX ADMIN — Medication 125 MILLIGRAM(S): at 17:37

## 2022-01-01 RX ADMIN — ONDANSETRON 4 MILLIGRAM(S): 8 TABLET, FILM COATED ORAL at 15:05

## 2022-01-01 RX ADMIN — HEPARIN SODIUM 5000 UNIT(S): 5000 INJECTION INTRAVENOUS; SUBCUTANEOUS at 14:00

## 2022-01-01 RX ADMIN — Medication 125 MILLIGRAM(S): at 23:03

## 2022-01-01 NOTE — PROGRESS NOTE ADULT - SUBJECTIVE AND OBJECTIVE BOX
Patient is a 61y old  Female who presents with a chief complaint of vomiting and diarrhea (31 Dec 2021 15:09)    pt seen in ed [  ], reg med floor [ x  ], bed [ x ], chair at bedside [   ], a+o x3 [ x ], lethargic [  ],    nad [ x ]    still awaiting bed for transfer      Allergies    No Known Allergies        Vitals    T(F): 97.9 (01-01-22 @ 05:56), Max: 98.2 (12-31-21 @ 20:11)  HR: 88 (01-01-22 @ 05:56) (88 - 99)  BP: 108/58 (01-01-22 @ 05:56) (108/58 - 124/78)  RR: 17 (01-01-22 @ 05:56) (17 - 17)  SpO2: 98% (01-01-22 @ 05:56) (98% - 98%)  Wt(kg): --  CAPILLARY BLOOD GLUCOSE          Labs                      Clean Catch Clean Catch (Midstream)  12-28 @ 03:53   <10,000 CFU/mL Normal Urogenital Liv  --  --          Radiology Results      Meds    MEDICATIONS  (STANDING):  dextrose 5% + sodium chloride 0.45%. 1000 milliLiter(s) (100 mL/Hr) IV Continuous <Continuous>  heparin   Injectable 5000 Unit(s) SubCutaneous every 8 hours  levothyroxine Injectable 37.5 MICROGram(s) IV Push at bedtime  pantoprazole  Injectable 40 milliGRAM(s) IV Push daily  vancomycin    Solution 125 milliGRAM(s) Oral every 6 hours      MEDICATIONS  (PRN):  HYDROmorphone  Injectable 1 milliGRAM(s) IV Push every 4 hours PRN Moderate Pain (4 - 6)  ondansetron Injectable 4 milliGRAM(s) IV Push every 6 hours PRN Nausea      Physical Exam    Neuro :  no focal deficits  Respiratory: CTA B/L  CV: RRR, S1S2, no murmurs,   Abdominal: Soft, mild RLQ tender to deep palp, ND +BS,  Extremities: No edema, + peripheral pulses        ASSESSMENT    abd pain likely 2nd to cecal mass,   right hepatic lobe metastasis  partial sbo,   diarrhea 2nd to c diff,   h/o gall bladder adenocarcinoma,   Hypothyroidism  HTN (hypertension)  Hypercholesterolemia  S/P flash-bso        PLAN    npo,   d/w ir cecal lesion is a mass, no signs of fluid collection for drainage   gi cons for possible bx   cea decreased from 122 (10/31/2020) to 3.9 (12/26/21)   ca 19-9 noted    surg onc f/u  pt for transfer to [facility] for further surgical mgmt with dr Anna pending bed availability   stool c diff positive noted   cont vanco po   xray abd with here are persistent air-filled dilated central loops of small bowel. Some air is noted within the left colon. Evaluation for free intraperitoneal air is limited on this exam.  Surgical clips are present in the right upper quadrant abdomen noted above.  unable to start po diet given ab xray findings   f/u small bowel series  cont ivf  heme onc f/u   Patient currently on active treatment gemcitabine + oxaliplatin. Last treatment December 13th.  No chemotherapy while inpatient. On discharge, to follow-up with Dr. Kyle.  Surgery has been consulted; recommending transfer to [facility]. Our team will continue to follow her there should she be transferred.  pt may require surgical intervention.   cont zofran  cont pain control  pt for transfer to [facility] for further mgmt pending available bed   pt accepted to dr nargis mayer's service  consulting surgeon  dr carie anna.  cont current meds

## 2022-01-02 LAB
ANION GAP SERPL CALC-SCNC: 6 MMOL/L — SIGNIFICANT CHANGE UP (ref 5–17)
BUN SERPL-MCNC: 7 MG/DL — SIGNIFICANT CHANGE UP (ref 7–18)
CALCIUM SERPL-MCNC: 8.1 MG/DL — LOW (ref 8.4–10.5)
CHLORIDE SERPL-SCNC: 108 MMOL/L — SIGNIFICANT CHANGE UP (ref 96–108)
CO2 SERPL-SCNC: 27 MMOL/L — SIGNIFICANT CHANGE UP (ref 22–31)
CREAT SERPL-MCNC: 0.64 MG/DL — SIGNIFICANT CHANGE UP (ref 0.5–1.3)
GLUCOSE SERPL-MCNC: 129 MG/DL — HIGH (ref 70–99)
HCT VFR BLD CALC: 33.4 % — LOW (ref 34.5–45)
HGB BLD-MCNC: 10.7 G/DL — LOW (ref 11.5–15.5)
MCHC RBC-ENTMCNC: 27.4 PG — SIGNIFICANT CHANGE UP (ref 27–34)
MCHC RBC-ENTMCNC: 32 GM/DL — SIGNIFICANT CHANGE UP (ref 32–36)
MCV RBC AUTO: 85.4 FL — SIGNIFICANT CHANGE UP (ref 80–100)
NRBC # BLD: 0 /100 WBCS — SIGNIFICANT CHANGE UP (ref 0–0)
PLATELET # BLD AUTO: 290 K/UL — SIGNIFICANT CHANGE UP (ref 150–400)
POTASSIUM SERPL-MCNC: 3.3 MMOL/L — LOW (ref 3.5–5.3)
POTASSIUM SERPL-SCNC: 3.3 MMOL/L — LOW (ref 3.5–5.3)
RBC # BLD: 3.91 M/UL — SIGNIFICANT CHANGE UP (ref 3.8–5.2)
RBC # FLD: 16.4 % — HIGH (ref 10.3–14.5)
SARS-COV-2 RNA SPEC QL NAA+PROBE: SIGNIFICANT CHANGE UP
SODIUM SERPL-SCNC: 141 MMOL/L — SIGNIFICANT CHANGE UP (ref 135–145)
WBC # BLD: 12.7 K/UL — HIGH (ref 3.8–10.5)
WBC # FLD AUTO: 12.7 K/UL — HIGH (ref 3.8–10.5)

## 2022-01-02 PROCEDURE — 74176 CT ABD & PELVIS W/O CONTRAST: CPT | Mod: 26

## 2022-01-02 RX ORDER — ONDANSETRON 8 MG/1
4 TABLET, FILM COATED ORAL ONCE
Refills: 0 | Status: COMPLETED | OUTPATIENT
Start: 2022-01-02 | End: 2022-01-02

## 2022-01-02 RX ORDER — POTASSIUM CHLORIDE 20 MEQ
40 PACKET (EA) ORAL ONCE
Refills: 0 | Status: DISCONTINUED | OUTPATIENT
Start: 2022-01-02 | End: 2022-01-02

## 2022-01-02 RX ORDER — POTASSIUM CHLORIDE 20 MEQ
40 PACKET (EA) ORAL ONCE
Refills: 0 | Status: COMPLETED | OUTPATIENT
Start: 2022-01-02 | End: 2022-01-02

## 2022-01-02 RX ORDER — POTASSIUM CHLORIDE 20 MEQ
10 PACKET (EA) ORAL
Refills: 0 | Status: DISCONTINUED | OUTPATIENT
Start: 2022-01-02 | End: 2022-01-02

## 2022-01-02 RX ADMIN — SODIUM CHLORIDE 100 MILLILITER(S): 9 INJECTION, SOLUTION INTRAVENOUS at 09:28

## 2022-01-02 RX ADMIN — Medication 40 MILLIEQUIVALENT(S): at 18:49

## 2022-01-02 RX ADMIN — HEPARIN SODIUM 5000 UNIT(S): 5000 INJECTION INTRAVENOUS; SUBCUTANEOUS at 21:17

## 2022-01-02 RX ADMIN — ONDANSETRON 4 MILLIGRAM(S): 8 TABLET, FILM COATED ORAL at 09:28

## 2022-01-02 RX ADMIN — Medication 125 MILLIGRAM(S): at 18:00

## 2022-01-02 RX ADMIN — PANTOPRAZOLE SODIUM 40 MILLIGRAM(S): 20 TABLET, DELAYED RELEASE ORAL at 13:53

## 2022-01-02 RX ADMIN — HEPARIN SODIUM 5000 UNIT(S): 5000 INJECTION INTRAVENOUS; SUBCUTANEOUS at 05:12

## 2022-01-02 RX ADMIN — ONDANSETRON 4 MILLIGRAM(S): 8 TABLET, FILM COATED ORAL at 05:22

## 2022-01-02 RX ADMIN — Medication 37.5 MICROGRAM(S): at 22:21

## 2022-01-02 RX ADMIN — Medication 125 MILLIGRAM(S): at 05:11

## 2022-01-02 RX ADMIN — SODIUM CHLORIDE 100 MILLILITER(S): 9 INJECTION, SOLUTION INTRAVENOUS at 21:17

## 2022-01-02 RX ADMIN — Medication 125 MILLIGRAM(S): at 13:54

## 2022-01-02 RX ADMIN — HEPARIN SODIUM 5000 UNIT(S): 5000 INJECTION INTRAVENOUS; SUBCUTANEOUS at 13:54

## 2022-01-02 NOTE — PROGRESS NOTE ADULT - SUBJECTIVE AND OBJECTIVE BOX
Patient is a 61y old  Female who presents with a chief complaint of vomiting and diarrhea (01 Jan 2022 06:37)    pt seen in ed [  ], reg med floor [ x  ], bed [ x ], chair at bedside [   ], a+o x3 [ x ], lethargic [  ],    nad [ x ]    still awaiting bed for transfer        Allergies    No Known Allergies        Vitals    T(F): 97.4 (01-02-22 @ 05:56), Max: 98.5 (01-01-22 @ 14:05)  HR: 100 (01-02-22 @ 05:56) (96 - 107)  BP: 124/68 (01-02-22 @ 05:56) (117/75 - 125/58)  RR: 18 (01-02-22 @ 05:56) (17 - 19)  SpO2: 96% (01-02-22 @ 05:56) (96% - 100%)  Wt(kg): --  CAPILLARY BLOOD GLUCOSE          Labs                          11.2   10.51 )-----------( 301      ( 01 Jan 2022 11:27 )             35.6       01-01    140  |  107  |  5<L>  ----------------------------<  102<H>  3.1<L>   |  25  |  0.59    Ca    8.0<L>      01 Jan 2022 11:27              Clean Catch Clean Catch (Midstream)  12-28 @ 03:53   <10,000 CFU/mL Normal Urogenital Liv  --  --          Radiology Results      Meds    MEDICATIONS  (STANDING):  dextrose 5% + sodium chloride 0.45%. 1000 milliLiter(s) (100 mL/Hr) IV Continuous <Continuous>  heparin   Injectable 5000 Unit(s) SubCutaneous every 8 hours  levothyroxine Injectable 37.5 MICROGram(s) IV Push at bedtime  pantoprazole  Injectable 40 milliGRAM(s) IV Push daily  vancomycin    Solution 125 milliGRAM(s) Oral every 6 hours      MEDICATIONS  (PRN):  HYDROmorphone  Injectable 1 milliGRAM(s) IV Push every 4 hours PRN Moderate Pain (4 - 6)  ondansetron Injectable 4 milliGRAM(s) IV Push every 6 hours PRN Nausea      Physical Exam    Neuro :  no focal deficits  Respiratory: CTA B/L  CV: RRR, S1S2, no murmurs,   Abdominal: Soft, mild RLQ tender to deep palp, ND +BS,  Extremities: No edema, + peripheral pulses        ASSESSMENT    abd pain likely 2nd to cecal mass,   right hepatic lobe metastasis  partial sbo,   diarrhea 2nd to c diff,   h/o gall bladder adenocarcinoma,   Hypothyroidism  HTN (hypertension)  Hypercholesterolemia  S/P flash-bso        PLAN    npo,   d/w ir cecal lesion is a mass, no signs of fluid collection for drainage   gi cons for possible bx   cea decreased from 122 (10/31/2020) to 3.9 (12/26/21)   ca 19-9 noted    surg onc f/u  pt for transfer to [facility] for further surgical mgmt with dr Anna pending bed availability   stool c diff positive noted   cont vanco po   xray abd with here are persistent air-filled dilated central loops of small bowel. Some air is noted within the left colon. Evaluation for free intraperitoneal air is limited on this exam.  Surgical clips are present in the right upper quadrant abdomen noted above.  unable to start po diet given ab xray findings   f/u small bowel series  cont ivf  heme onc f/u   Patient currently on active treatment gemcitabine + oxaliplatin. Last treatment December 13th.  No chemotherapy while inpatient. On discharge, to follow-up with Dr. Kyle.  Surgery has been consulted; recommending transfer to [facility]. Our team will continue to follow her there should she be transferred.  pt may require surgical intervention.   cont zofran  cont pain control  pt for transfer to [facility] for further mgmt pending available bed   pt accepted to dr nargis mayer's service  consulting surgeon  dr carie anna.  cont current meds

## 2022-01-02 NOTE — CHART NOTE - NSCHARTNOTEFT_GEN_A_CORE
Patient is a 61y old  Female who presents with a chief complaint of vomiting and diarrhea (02 Jan 2022 06:33)  Pending bed availability for transfer to [facility] for further management       Vital Signs Last 24 Hrs  T(C): 36.8 (02 Jan 2022 14:04), Max: 36.8 (02 Jan 2022 14:04)  T(F): 98.3 (02 Jan 2022 14:04), Max: 98.3 (02 Jan 2022 14:04)  HR: 98 (02 Jan 2022 14:04) (98 - 107)  BP: 93/54 (02 Jan 2022 14:04) (93/54 - 125/58)  RR: 16 (02 Jan 2022 14:04) (16 - 19)  SpO2: 100% (02 Jan 2022 14:04) (96% - 100%)    PAST MEDICAL & SURGICAL HISTORY:  HTN (hypertension),  Hypothyroidism, Hyperlipidemia, Malignant neoplasm, left ovarian, s/p chemotherapy, H/O breast surgery, l&amp;D left mastitis 27y/o, S/P hysterectomy    LABS:                        10.7   12.70 )-----------( 290      ( 02 Jan 2022 10:46 )             33.4     01-02    141  |  108  |  7   ----------------------------<  129<H>  3.3<L>   |  27  |  0.64    Ca    8.1<L>      02 Jan 2022 10:46    ASSESSMENT and PLAN   61 y old F admitted for abdominal pain and diarrhea found to be C. dif positive with Right hepatic lobe metastasis, partial SBO, followed by surgery with recommendation to transfer to [facility] for possible surgical interventions   kept NPO, hem/onc follows, surgery follows,   labs reviewed today with K+3.3 will replenish with IV KCL x 3   pending bed in [facility] accepted by Dr Frederick trejo and sx Dr Priya Anna

## 2022-01-03 ENCOUNTER — TRANSCRIPTION ENCOUNTER (OUTPATIENT)
Age: 62
End: 2022-01-03

## 2022-01-03 ENCOUNTER — INPATIENT (INPATIENT)
Facility: HOSPITAL | Age: 62
LOS: 11 days | Discharge: HOSPICE | End: 2022-01-15
Attending: LEGAL MEDICINE | Admitting: LEGAL MEDICINE
Payer: MEDICAID

## 2022-01-03 VITALS
TEMPERATURE: 98 F | OXYGEN SATURATION: 98 % | DIASTOLIC BLOOD PRESSURE: 72 MMHG | SYSTOLIC BLOOD PRESSURE: 124 MMHG | RESPIRATION RATE: 18 BRPM | HEART RATE: 99 BPM

## 2022-01-03 VITALS
RESPIRATION RATE: 20 BRPM | HEART RATE: 95 BPM | OXYGEN SATURATION: 98 % | DIASTOLIC BLOOD PRESSURE: 80 MMHG | TEMPERATURE: 98 F | HEIGHT: 61 IN | SYSTOLIC BLOOD PRESSURE: 123 MMHG

## 2022-01-03 DIAGNOSIS — Z98.890 OTHER SPECIFIED POSTPROCEDURAL STATES: Chronic | ICD-10-CM

## 2022-01-03 DIAGNOSIS — A04.72 ENTEROCOLITIS DUE TO CLOSTRIDIUM DIFFICILE, NOT SPECIFIED AS RECURRENT: ICD-10-CM

## 2022-01-03 DIAGNOSIS — Z90.710 ACQUIRED ABSENCE OF BOTH CERVIX AND UTERUS: Chronic | ICD-10-CM

## 2022-01-03 DIAGNOSIS — I10 ESSENTIAL (PRIMARY) HYPERTENSION: ICD-10-CM

## 2022-01-03 DIAGNOSIS — C23 MALIGNANT NEOPLASM OF GALLBLADDER: ICD-10-CM

## 2022-01-03 DIAGNOSIS — R11.2 NAUSEA WITH VOMITING, UNSPECIFIED: ICD-10-CM

## 2022-01-03 DIAGNOSIS — E03.9 HYPOTHYROIDISM, UNSPECIFIED: ICD-10-CM

## 2022-01-03 DIAGNOSIS — Z29.9 ENCOUNTER FOR PROPHYLACTIC MEASURES, UNSPECIFIED: ICD-10-CM

## 2022-01-03 DIAGNOSIS — K56.609 UNSPECIFIED INTESTINAL OBSTRUCTION, UNSPECIFIED AS TO PARTIAL VERSUS COMPLETE OBSTRUCTION: ICD-10-CM

## 2022-01-03 LAB
ALBUMIN SERPL ELPH-MCNC: 2.9 G/DL — LOW (ref 3.3–5)
ALP SERPL-CCNC: 110 U/L — SIGNIFICANT CHANGE UP (ref 40–120)
ALT FLD-CCNC: 25 U/L — SIGNIFICANT CHANGE UP (ref 4–33)
ANION GAP SERPL CALC-SCNC: 10 MMOL/L — SIGNIFICANT CHANGE UP (ref 7–14)
APTT BLD: 27.3 SEC — SIGNIFICANT CHANGE UP (ref 27–36.3)
AST SERPL-CCNC: 18 U/L — SIGNIFICANT CHANGE UP (ref 4–32)
BASOPHILS # BLD AUTO: 0.04 K/UL — SIGNIFICANT CHANGE UP (ref 0–0.2)
BASOPHILS NFR BLD AUTO: 0.3 % — SIGNIFICANT CHANGE UP (ref 0–2)
BILIRUB SERPL-MCNC: 0.3 MG/DL — SIGNIFICANT CHANGE UP (ref 0.2–1.2)
BLD GP AB SCN SERPL QL: NEGATIVE — SIGNIFICANT CHANGE UP
BUN SERPL-MCNC: 10 MG/DL — SIGNIFICANT CHANGE UP (ref 7–23)
CALCIUM SERPL-MCNC: 8.2 MG/DL — LOW (ref 8.4–10.5)
CHLORIDE SERPL-SCNC: 105 MMOL/L — SIGNIFICANT CHANGE UP (ref 98–107)
CO2 SERPL-SCNC: 22 MMOL/L — SIGNIFICANT CHANGE UP (ref 22–31)
CREAT SERPL-MCNC: 0.68 MG/DL — SIGNIFICANT CHANGE UP (ref 0.5–1.3)
EOSINOPHIL # BLD AUTO: 0.02 K/UL — SIGNIFICANT CHANGE UP (ref 0–0.5)
EOSINOPHIL NFR BLD AUTO: 0.2 % — SIGNIFICANT CHANGE UP (ref 0–6)
GLUCOSE SERPL-MCNC: 102 MG/DL — HIGH (ref 70–99)
HCT VFR BLD CALC: 34.1 % — LOW (ref 34.5–45)
HGB BLD-MCNC: 11.3 G/DL — LOW (ref 11.5–15.5)
IANC: 10.97 K/UL — HIGH (ref 1.5–8.5)
IMM GRANULOCYTES NFR BLD AUTO: 1.2 % — SIGNIFICANT CHANGE UP (ref 0–1.5)
INR BLD: 1.15 RATIO — SIGNIFICANT CHANGE UP (ref 0.88–1.16)
LYMPHOCYTES # BLD AUTO: 1.32 K/UL — SIGNIFICANT CHANGE UP (ref 1–3.3)
LYMPHOCYTES # BLD AUTO: 10.1 % — LOW (ref 13–44)
MAGNESIUM SERPL-MCNC: 1.8 MG/DL — SIGNIFICANT CHANGE UP (ref 1.6–2.6)
MCHC RBC-ENTMCNC: 28.5 PG — SIGNIFICANT CHANGE UP (ref 27–34)
MCHC RBC-ENTMCNC: 33.1 GM/DL — SIGNIFICANT CHANGE UP (ref 32–36)
MCV RBC AUTO: 85.9 FL — SIGNIFICANT CHANGE UP (ref 80–100)
MONOCYTES # BLD AUTO: 0.61 K/UL — SIGNIFICANT CHANGE UP (ref 0–0.9)
MONOCYTES NFR BLD AUTO: 4.6 % — SIGNIFICANT CHANGE UP (ref 2–14)
NEUTROPHILS # BLD AUTO: 10.97 K/UL — HIGH (ref 1.8–7.4)
NEUTROPHILS NFR BLD AUTO: 83.6 % — HIGH (ref 43–77)
NRBC # BLD: 0 /100 WBCS — SIGNIFICANT CHANGE UP
NRBC # FLD: 0 K/UL — SIGNIFICANT CHANGE UP
PHOSPHATE SERPL-MCNC: 2.9 MG/DL — SIGNIFICANT CHANGE UP (ref 2.5–4.5)
PLATELET # BLD AUTO: 315 K/UL — SIGNIFICANT CHANGE UP (ref 150–400)
POTASSIUM SERPL-MCNC: 3.6 MMOL/L — SIGNIFICANT CHANGE UP (ref 3.5–5.3)
POTASSIUM SERPL-SCNC: 3.6 MMOL/L — SIGNIFICANT CHANGE UP (ref 3.5–5.3)
PROT SERPL-MCNC: 5.5 G/DL — LOW (ref 6–8.3)
PROTHROM AB SERPL-ACNC: 13.1 SEC — SIGNIFICANT CHANGE UP (ref 10.6–13.6)
RBC # BLD: 3.97 M/UL — SIGNIFICANT CHANGE UP (ref 3.8–5.2)
RBC # FLD: 16.8 % — HIGH (ref 10.3–14.5)
RH IG SCN BLD-IMP: POSITIVE — SIGNIFICANT CHANGE UP
SARS-COV-2 RNA SPEC QL NAA+PROBE: SIGNIFICANT CHANGE UP
SODIUM SERPL-SCNC: 137 MMOL/L — SIGNIFICANT CHANGE UP (ref 135–145)
WBC # BLD: 13.12 K/UL — HIGH (ref 3.8–10.5)
WBC # FLD AUTO: 13.12 K/UL — HIGH (ref 3.8–10.5)

## 2022-01-03 PROCEDURE — 74018 RADEX ABDOMEN 1 VIEW: CPT

## 2022-01-03 PROCEDURE — 87086 URINE CULTURE/COLONY COUNT: CPT

## 2022-01-03 PROCEDURE — 83735 ASSAY OF MAGNESIUM: CPT

## 2022-01-03 PROCEDURE — 81001 URINALYSIS AUTO W/SCOPE: CPT

## 2022-01-03 PROCEDURE — 86301 IMMUNOASSAY TUMOR CA 19-9: CPT

## 2022-01-03 PROCEDURE — 83690 ASSAY OF LIPASE: CPT

## 2022-01-03 PROCEDURE — 85027 COMPLETE CBC AUTOMATED: CPT

## 2022-01-03 PROCEDURE — 74177 CT ABD & PELVIS W/CONTRAST: CPT | Mod: MA

## 2022-01-03 PROCEDURE — 85610 PROTHROMBIN TIME: CPT

## 2022-01-03 PROCEDURE — 87493 C DIFF AMPLIFIED PROBE: CPT

## 2022-01-03 PROCEDURE — 86850 RBC ANTIBODY SCREEN: CPT

## 2022-01-03 PROCEDURE — 86900 BLOOD TYPING SEROLOGIC ABO: CPT

## 2022-01-03 PROCEDURE — 84100 ASSAY OF PHOSPHORUS: CPT

## 2022-01-03 PROCEDURE — 71045 X-RAY EXAM CHEST 1 VIEW: CPT | Mod: 26

## 2022-01-03 PROCEDURE — 83605 ASSAY OF LACTIC ACID: CPT

## 2022-01-03 PROCEDURE — 85025 COMPLETE CBC W/AUTO DIFF WBC: CPT

## 2022-01-03 PROCEDURE — 86901 BLOOD TYPING SEROLOGIC RH(D): CPT

## 2022-01-03 PROCEDURE — 36415 COLL VENOUS BLD VENIPUNCTURE: CPT

## 2022-01-03 PROCEDURE — 99283 EMERGENCY DEPT VISIT LOW MDM: CPT

## 2022-01-03 PROCEDURE — 80053 COMPREHEN METABOLIC PANEL: CPT

## 2022-01-03 PROCEDURE — 80048 BASIC METABOLIC PNL TOTAL CA: CPT

## 2022-01-03 PROCEDURE — 74176 CT ABD & PELVIS W/O CONTRAST: CPT

## 2022-01-03 PROCEDURE — 85730 THROMBOPLASTIN TIME PARTIAL: CPT

## 2022-01-03 PROCEDURE — 99223 1ST HOSP IP/OBS HIGH 75: CPT

## 2022-01-03 PROCEDURE — 82378 CARCINOEMBRYONIC ANTIGEN: CPT

## 2022-01-03 PROCEDURE — 87635 SARS-COV-2 COVID-19 AMP PRB: CPT

## 2022-01-03 PROCEDURE — 99285 EMERGENCY DEPT VISIT HI MDM: CPT | Mod: 25

## 2022-01-03 RX ORDER — VANCOMYCIN HCL 1 G
125 VIAL (EA) INTRAVENOUS EVERY 6 HOURS
Refills: 0 | Status: DISCONTINUED | OUTPATIENT
Start: 2022-01-03 | End: 2022-01-04

## 2022-01-03 RX ORDER — POTASSIUM CHLORIDE 20 MEQ
10 PACKET (EA) ORAL
Refills: 0 | Status: COMPLETED | OUTPATIENT
Start: 2022-01-03 | End: 2022-01-03

## 2022-01-03 RX ORDER — LEVOTHYROXINE SODIUM 125 MCG
37.5 TABLET ORAL AT BEDTIME
Refills: 0 | Status: DISCONTINUED | OUTPATIENT
Start: 2022-01-03 | End: 2022-01-15

## 2022-01-03 RX ORDER — MORPHINE SULFATE 50 MG/1
2 CAPSULE, EXTENDED RELEASE ORAL EVERY 6 HOURS
Refills: 0 | Status: DISCONTINUED | OUTPATIENT
Start: 2022-01-03 | End: 2022-01-05

## 2022-01-03 RX ORDER — SODIUM CHLORIDE 9 MG/ML
1000 INJECTION, SOLUTION INTRAVENOUS
Refills: 0 | Status: DISCONTINUED | OUTPATIENT
Start: 2022-01-03 | End: 2022-01-13

## 2022-01-03 RX ORDER — LEVOTHYROXINE SODIUM 125 MCG
1 TABLET ORAL
Qty: 0 | Refills: 0 | DISCHARGE

## 2022-01-03 RX ORDER — ENOXAPARIN SODIUM 100 MG/ML
40 INJECTION SUBCUTANEOUS DAILY
Refills: 0 | Status: DISCONTINUED | OUTPATIENT
Start: 2022-01-03 | End: 2022-01-15

## 2022-01-03 RX ORDER — ONDANSETRON 8 MG/1
4 TABLET, FILM COATED ORAL EVERY 8 HOURS
Refills: 0 | Status: DISCONTINUED | OUTPATIENT
Start: 2022-01-03 | End: 2022-01-15

## 2022-01-03 RX ORDER — PANTOPRAZOLE SODIUM 20 MG/1
40 TABLET, DELAYED RELEASE ORAL DAILY
Refills: 0 | Status: DISCONTINUED | OUTPATIENT
Start: 2022-01-04 | End: 2022-01-15

## 2022-01-03 RX ORDER — L.ACIDOPH/B.ANIMALIS/B.LONGUM 15B CELL
2 CAPSULE ORAL
Qty: 0 | Refills: 0 | DISCHARGE

## 2022-01-03 RX ORDER — ATORVASTATIN CALCIUM 80 MG/1
1 TABLET, FILM COATED ORAL
Qty: 0 | Refills: 0 | DISCHARGE

## 2022-01-03 RX ORDER — HYDROMORPHONE HYDROCHLORIDE 2 MG/ML
1 INJECTION INTRAMUSCULAR; INTRAVENOUS; SUBCUTANEOUS EVERY 6 HOURS
Refills: 0 | Status: DISCONTINUED | OUTPATIENT
Start: 2022-01-03 | End: 2022-01-05

## 2022-01-03 RX ORDER — ONDANSETRON 8 MG/1
4 TABLET, FILM COATED ORAL ONCE
Refills: 0 | Status: COMPLETED | OUTPATIENT
Start: 2022-01-03 | End: 2022-01-03

## 2022-01-03 RX ORDER — LANOLIN ALCOHOL/MO/W.PET/CERES
9 CREAM (GRAM) TOPICAL ONCE
Refills: 0 | Status: COMPLETED | OUTPATIENT
Start: 2022-01-03 | End: 2022-01-03

## 2022-01-03 RX ORDER — MORPHINE SULFATE 50 MG/1
4 CAPSULE, EXTENDED RELEASE ORAL ONCE
Refills: 0 | Status: DISCONTINUED | OUTPATIENT
Start: 2022-01-03 | End: 2022-01-03

## 2022-01-03 RX ADMIN — SODIUM CHLORIDE 100 MILLILITER(S): 9 INJECTION, SOLUTION INTRAVENOUS at 18:13

## 2022-01-03 RX ADMIN — Medication 9 MILLIGRAM(S): at 22:58

## 2022-01-03 RX ADMIN — Medication 125 MILLIGRAM(S): at 00:32

## 2022-01-03 RX ADMIN — ENOXAPARIN SODIUM 40 MILLIGRAM(S): 100 INJECTION SUBCUTANEOUS at 18:13

## 2022-01-03 RX ADMIN — Medication 100 MILLIEQUIVALENT(S): at 20:56

## 2022-01-03 RX ADMIN — Medication 100 MILLIEQUIVALENT(S): at 18:57

## 2022-01-03 RX ADMIN — ONDANSETRON 4 MILLIGRAM(S): 8 TABLET, FILM COATED ORAL at 13:30

## 2022-01-03 RX ADMIN — HEPARIN SODIUM 5000 UNIT(S): 5000 INJECTION INTRAVENOUS; SUBCUTANEOUS at 05:53

## 2022-01-03 RX ADMIN — MORPHINE SULFATE 4 MILLIGRAM(S): 50 CAPSULE, EXTENDED RELEASE ORAL at 13:30

## 2022-01-03 RX ADMIN — Medication 125 MILLIGRAM(S): at 20:56

## 2022-01-03 RX ADMIN — Medication 100 MILLIEQUIVALENT(S): at 22:15

## 2022-01-03 RX ADMIN — Medication 125 MILLIGRAM(S): at 05:53

## 2022-01-03 RX ADMIN — ONDANSETRON 4 MILLIGRAM(S): 8 TABLET, FILM COATED ORAL at 09:52

## 2022-01-03 NOTE — H&P ADULT - PROBLEM SELECTOR PLAN 1
Nadir Nino is a 30 year old male presenting for an ED f/u.  Pt was seen at [facility] due to nausea, light sensitivity, body aches.  States he's improving.     Health Maintenance Summary     Topic Due On Due Status Completed On    IMMUNIZATION - DTaP/Tdap/Td Jul 1, 2023 Not Due Jul 1, 2013    Immunization-Influenza Sep 1, 2018 Not Due Sep 10, 2016    Depression Screening Mar 5, 2019 Not Due Mar 5, 2018          Patient is up to date, no discussion needed .  LATEX allergy reviewed with pt.  Medication list verified with patients assistance.    Patient would like communication of their results via:        Cell Phone:   Telephone Information:   Mobile [phone]     Okay to leave a message containing results? Yes             pt w/ high grade SBO at level of cecum due to large cecal mass. Seen by surgery at OSH, transferred here for potential intervention  -likely cause of white count to 13, pt afebrile and vitals stable  -still tolerating po vanc, will continue given cdiff positive, otherwise keep NPO  -surg onc consulted by ER, will f/u recs, continue ivf and pain control  -Pt able to complete all ADLs with no dyspnea nor chest pain and RCRI of 0-1 depending on surgical procedure. Will check EKG first and if no issues, no further cardiac workup needed before surgery (if deemed necessary by surgery)

## 2022-01-03 NOTE — PROGRESS NOTE ADULT - REASON FOR ADMISSION
vomiting and diarrhea

## 2022-01-03 NOTE — PHARMACOTHERAPY INTERVENTION NOTE - COMMENTS
unable to verify meds with patient  meds verified with [facility]

## 2022-01-03 NOTE — H&P ADULT - HISTORY OF PRESENT ILLNESS
60 y/o woman with pmhx of hypothyroid, HTN, HLD, met. ovarian ca (liver, lung) s/p ALYSSA-BSO, debulking of neoplasm, and newly diagnosed gallbladder ca (unclear metastastic) presents as a transfer from OSH for further potential surgical management of her metastatic disease. She initially presented to [facility] for 6 days of abd pain, vomiting and diarrhea, during her course she was found to cdiff positive (started on po vanc) and found to have a high grade SBO 2/2 large necrotic cecal tumor with metastatic disease. She reports her pain is controlled on pain meds. Reports 3 bouts of diarrhea today but none in a few hours and currently not nauseous. She has been able to tolerate the PO vanco started at . She denies fever, chills, chest pain, shortness of breath, or dysuria. At baseline she can walk and accomplish all ADLs with no chest pain nor sob. She has no hx of CAD, CHF, CKD, DM or CVA. Pt was given zofran and morphine in the ER

## 2022-01-03 NOTE — DISCHARGE NOTE NURSING/CASE MANAGEMENT/SOCIAL WORK - PATIENT PORTAL LINK FT
You can access the FollowMyHealth Patient Portal offered by [facility] by registering at the following website: [email or web address]. By joining [employer]’s FollowMyHealth portal, you will also be able to view your health information using other applications (apps) compatible with our system.

## 2022-01-03 NOTE — PROGRESS NOTE ADULT - SUBJECTIVE AND OBJECTIVE BOX
Patient is a 61y old  Female who presents with a chief complaint of vomiting and diarrhea (02 Jan 2022 06:33)    pt seen in ed [  ], reg med floor [ x  ], bed [ x ], chair at bedside [   ], a+o x3 [ x ], lethargic [  ],    nad [ x ]    still awaiting bed for transfer      Allergies    No Known Allergies        Vitals    T(F): 97.5 (01-03-22 @ 05:43), Max: 98.3 (01-02-22 @ 14:04)  HR: 89 (01-03-22 @ 05:43) (89 - 98)  BP: 104/67 (01-03-22 @ 05:43) (93/54 - 107/60)  RR: 17 (01-03-22 @ 05:43) (16 - 17)  SpO2: 96% (01-03-22 @ 05:43) (96% - 100%)  Wt(kg): --  CAPILLARY BLOOD GLUCOSE          Labs                          10.7   12.70 )-----------( 290      ( 02 Jan 2022 10:46 )             33.4       01-02    141  |  108  |  7   ----------------------------<  129<H>  3.3<L>   |  27  |  0.64    Ca    8.1<L>      02 Jan 2022 10:46              Clean Catch Clean Catch (Midstream)  12-28 @ 03:53   <10,000 CFU/mL Normal Urogenital Liv  --  --          Radiology Results      Meds    MEDICATIONS  (STANDING):  dextrose 5% + sodium chloride 0.45%. 1000 milliLiter(s) (100 mL/Hr) IV Continuous <Continuous>  heparin   Injectable 5000 Unit(s) SubCutaneous every 8 hours  levothyroxine Injectable 37.5 MICROGram(s) IV Push at bedtime  pantoprazole  Injectable 40 milliGRAM(s) IV Push daily  vancomycin    Solution 125 milliGRAM(s) Oral every 6 hours      MEDICATIONS  (PRN):  HYDROmorphone  Injectable 1 milliGRAM(s) IV Push every 4 hours PRN Moderate Pain (4 - 6)  ondansetron Injectable 4 milliGRAM(s) IV Push every 6 hours PRN Nausea      Physical Exam    Neuro :  no focal deficits  Respiratory: CTA B/L  CV: RRR, S1S2, no murmurs,   Abdominal: Soft, mild RLQ tender to deep palp, ND +BS,  Extremities: No edema, + peripheral pulses        ASSESSMENT    abd pain likely 2nd to cecal mass,   right hepatic lobe metastasis  partial sbo,   diarrhea 2nd to c diff,   h/o gall bladder adenocarcinoma,   Hypothyroidism  HTN (hypertension)  Hypercholesterolemia  S/P flash-bso        PLAN    npo,   d/w ir cecal lesion is a mass, no signs of fluid collection for drainage   gi cons for possible bx   cea decreased from 122 (10/31/2020) to 3.9 (12/26/21)   ca 19-9 noted    surg onc f/u  pt for transfer to [facility] for further surgical mgmt with dr Anna pending bed availability   stool c diff positive noted   cont vanco po   xray abd with here are persistent air-filled dilated central loops of small bowel. Some air is noted within the left colon. Evaluation for free intraperitoneal air is limited on this exam.  Surgical clips are present in the right upper quadrant abdomen noted above.  unable to start po diet given ab xray findings   f/u small bowel series  cont ivf  heme onc f/u   Patient currently on active treatment gemcitabine + oxaliplatin. Last treatment December 13th.  No chemotherapy while inpatient. On discharge, to follow-up with Dr. Kyle.  Surgery has been consulted; recommending transfer to [facility] [place]. Our team will continue to follow her there should she be transferred.  pt may require surgical intervention.   cont zofran  cont pain control  pt for transfer to [facility] for further mgmt pending available bed   pt accepted to dr nargis mayer's service  consulting surgeon  dr carie anna.  cont current meds       Patient is a 61y old  Female who presents with a chief complaint of vomiting and diarrhea (02 Jan 2022 06:33)    pt seen in ed [  ], reg med floor [ x  ], bed [ x ], chair at bedside [   ], a+o x3 [ x ], lethargic [  ],    nad [ x ]    still awaiting bed for transfer      Allergies    No Known Allergies        Vitals    T(F): 97.5 (01-03-22 @ 05:43), Max: 98.3 (01-02-22 @ 14:04)  HR: 89 (01-03-22 @ 05:43) (89 - 98)  BP: 104/67 (01-03-22 @ 05:43) (93/54 - 107/60)  RR: 17 (01-03-22 @ 05:43) (16 - 17)  SpO2: 96% (01-03-22 @ 05:43) (96% - 100%)  Wt(kg): --  CAPILLARY BLOOD GLUCOSE          Labs                          10.7   12.70 )-----------( 290      ( 02 Jan 2022 10:46 )             33.4       01-02    141  |  108  |  7   ----------------------------<  129<H>  3.3<L>   |  27  |  0.64    Ca    8.1<L>      02 Jan 2022 10:46              Clean Catch Clean Catch (Midstream)  12-28 @ 03:53   <10,000 CFU/mL Normal Urogenital Liv  --  --          Radiology Results    < from: CT Abdomen and Pelvis No Cont (01.02.22 @ 13:11) >  IMPRESSION: High-grade small bowel obstruction at the level of the cecum   with extensive local spread of malignancy and new mild intraperitoneal   free fluid.    < end of copied text >      Meds    MEDICATIONS  (STANDING):  dextrose 5% + sodium chloride 0.45%. 1000 milliLiter(s) (100 mL/Hr) IV Continuous <Continuous>  heparin   Injectable 5000 Unit(s) SubCutaneous every 8 hours  levothyroxine Injectable 37.5 MICROGram(s) IV Push at bedtime  pantoprazole  Injectable 40 milliGRAM(s) IV Push daily  vancomycin    Solution 125 milliGRAM(s) Oral every 6 hours      MEDICATIONS  (PRN):  HYDROmorphone  Injectable 1 milliGRAM(s) IV Push every 4 hours PRN Moderate Pain (4 - 6)  ondansetron Injectable 4 milliGRAM(s) IV Push every 6 hours PRN Nausea      Physical Exam    Neuro :  no focal deficits  Respiratory: CTA B/L  CV: RRR, S1S2, no murmurs,   Abdominal: Soft, mild RLQ tender to deep palp, ND +BS,  Extremities: No edema, + peripheral pulses        ASSESSMENT    abd pain likely 2nd to cecal mass,   right hepatic lobe metastasis  partial sbo,   diarrhea 2nd to c diff,   h/o gall bladder adenocarcinoma,   Hypothyroidism  HTN (hypertension)  Hypercholesterolemia  S/P flash-bso        PLAN    npo,   d/w ir cecal lesion is a mass, no signs of fluid collection for drainage   gi cons for possible bx   cea decreased from 122 (10/31/2020) to 3.9 (12/26/21)   ca 19-9 noted    surg onc f/u  pt for transfer to [facility] for further surgical mgmt with dr Anna pending bed availability   stool c diff positive noted   cont vanco po   xray abd with here are persistent air-filled dilated central loops of small bowel. Some air is noted within the left colon. Evaluation for free intraperitoneal air is limited on this exam.  Surgical clips are present in the right upper quadrant abdomen noted above.  unable to start po diet given ab xray findings   rept ct abd-pelv with  High-grade small bowel obstruction at the level of the cecum with extensive local spread of malignancy and new mild intraperitoneal free fluid noted above.  cont ivf  heme onc f/u   Patient currently on active treatment gemcitabine + oxaliplatin. Last treatment December 13th.  No chemotherapy while inpatient. On discharge, to follow-up with Dr. Kyle.  Surgery has been consulted; recommending transfer to [facility]. Our team will continue to follow her there should she be transferred.  pt may require surgical intervention.   cont zofran  cont pain control  cont current meds  given findings on rept ct abd-pelv, pt for emergent transfer to ed at [facility]

## 2022-01-03 NOTE — ED PROVIDER NOTE - NS ED ROS FT
GENERAL: No fever, chills   EYES: no vision changes, no discharge.   ENT: no difficulty swallowing or speaking   CARDIAC: no chest pain/pressure, SOB, lower extremity swelling  PULMONARY: no cough, SOB  GI: + abdominal pain, +n/v/d  : no dysuria, no hematuria  SKIN: no rashes, no ecchymosis  NEURO: no headache, lightheadedness  MSK: No joint pain, myalgia, weakness.

## 2022-01-03 NOTE — ED PROVIDER NOTE - CLINICAL SUMMARY MEDICAL DECISION MAKING FREE TEXT BOX
60 yo F hx of gallbladder adenocarcinoma, hypothyroidism, C diff, right bepatic love mets, partial sbo, transferred from [facility] for surgery, onc recommendations. symptom control, repeat labs, admit to Dr. Mack.

## 2022-01-03 NOTE — CONSULT NOTE ADULT - ASSESSMENT
61F w/ known metastatic ovarian and now newly diagnosed gallbladder CA s/p ALYSSA-BSO, appendectomy, omentectomy,, and cholecystectomy who now presents as a transfer from [facility] after bein found to have c.diff colitis along with high grade SBO presumed to be secondary to large cecal necrotic mass.    Recommendations:  - NGT for decompression in the setting of SBO.  - NPO/IVFs  - Medical management of patient's C.diff  - No acute intervention at this time in the acute setting unless patient's clinical status worsens.  - Surgical management planning pending resolution of patient's current acute medical illness.    Discussed with attending surgeon Dr. Anna.    TRISH Casas, PGY-2  [facility]  A Team Surgery  [id number]

## 2022-01-03 NOTE — H&P ADULT - NSHPPHYSICALEXAM_GEN_ALL_CORE
PHYSICAL EXAM:  GENERAL: NAD, well-developed, well-nourished  HEAD:  Atraumatic, Normocephalic  EYES: EOMI, PERRLA, conjunctiva and sclera clear  NECK: Supple, No JVD  CHEST/LUNG: Clear to auscultation bilaterally; No wheezes, rales or rhonchi  HEART: Regular rate and rhythm; No murmurs, rubs, or gallops, (+)S1, S2  ABDOMEN: Soft, mildly ttp epigastrum. nondistended. minimal bowel sounds. no rebound or guarding.   EXTREMITIES:  2+ Peripheral Pulses, No clubbing, cyanosis, or edema  PSYCH: normal mood and affect  NEUROLOGY: AAOx3, non-focal  SKIN: No rashes or lesions

## 2022-01-03 NOTE — H&P ADULT - NSHPLABSRESULTS_GEN_ALL_CORE
01-03    137  |  105  |  10  ----------------------------<  102<H>  3.6   |  22  |  0.68    Ca    8.2<L>      03 Jan 2022 14:56  Phos  2.9     01-03  Mg     1.80     01-03    TPro  5.5<L>  /  Alb  2.9<L>  /  TBili  0.3  /  DBili  x   /  AST  18  /  ALT  25  /  AlkPhos  110  01-03                            11.3   13.12 )-----------( 315      ( 03 Jan 2022 14:56 )             34.1             LIVER FUNCTIONS - ( 03 Jan 2022 14:56 )  Alb: 2.9 g/dL / Pro: 5.5 g/dL / ALK PHOS: 110 U/L / ALT: 25 U/L / AST: 18 U/L / GGT: x             PT/INR - ( 03 Jan 2022 14:56 )   PT: 13.1 sec;   INR: 1.15 ratio         PTT - ( 03 Jan 2022 14:56 )  PTT:27.3 sec      CT abd/pelvis noncon: FINDINGS:  LOWER CHEST: Within normal limits. Central venous catheter tip in the   right atrium.    LIVER: Within normal limits.  BILE DUCTS: Normal caliber.  GALLBLADDER: Within normal limits.  SPLEEN: Within normal limits.  PANCREAS: Within normal limits.  ADRENALS: Within normal limits.  KIDNEYS/URETERS: Within normal limits.    BLADDER: Within normal limits.  REPRODUCTIVE ORGANS:    BOWEL: Again is noted a large circumferential mass of the cecum,   extending along the ascending colon and into the terminal ileum with use   dilatation of the mid and distal small bowel to 4.9 cm. There is   extensive soft tissue nodularity in the right paracolic gutter and   ileocolic mesentery, compatible with metastatic disease. There is fluid   infiltration of the mesentery with new mild fluid throughout the   peritoneal cavity. There is no pneumatosis or extraluminal gas. The   transverse colon and left colon are collapsed. The stomach is collapsed.  PERITONEUM: No ascites.  VESSELS: Within normal limits.  RETROPERITONEUM/LYMPH NODES: Shotty retroperitoneal, para-aortic, and   aortocaval lymph nodes.  ABDOMINAL WALL: Within normal limits.  BONES: Within normal limits.    IMPRESSION: High-grade small bowel obstruction at the level of the cecum   with extensive local spread of malignancy and new mild intraperitoneal   free fluid.

## 2022-01-03 NOTE — H&P ADULT - PROBLEM SELECTOR PLAN 2
still having diarrhea, tolerating po vanc at OSH. ID recs in chart, continue po vanc 125mg q6h  -low threshold to start zosyn if signs of sepsis

## 2022-01-03 NOTE — H&P ADULT - NSICDXPASTMEDICALHX_GEN_ALL_CORE_FT
PAST MEDICAL HISTORY:  HTN (hypertension)     Hyperlipidemia     Hypothyroidism     Malignant neoplasm left ovarian, s/p chemotherapy, gallbladder ca on chemo

## 2022-01-03 NOTE — DISCHARGE NOTE NURSING/CASE MANAGEMENT/SOCIAL WORK - NSDCPEFALRISK_GEN_ALL_CORE
For information on Fall & Injury Prevention, visit: [email or web address] OR  [email or web address] OR  https://www.cdc.gov/steadi/patient.html

## 2022-01-03 NOTE — ED PROVIDER NOTE - COVID-19 RESULT DATE/TIME
02-Jan-2022 06:33 FOLLOW-UP  It is recommended you schedule a follow-up appointment with Dr. Alegria around No follow-ups on file..    Office hours are 8:00 am to 5:00 pm Monday through Friday.  If it is urgent that you speak with someone outside of these hours, the [facility] will be able to assist you.  You can reach the office by calling [phone].    Thank you for choosing Alisha Alegria MD as your Endocrinology provider.    At [facility], one important tool we use to improve our patient services is our Patient Survey.  Following your visit you may receive our survey in the mail.     · Please take the time to complete the survey.     · If your visit with us was great, we want to hear about it.     · If we can improve, please let us know how.

## 2022-01-03 NOTE — ED PROVIDER NOTE - OBJECTIVE STATEMENT
62 yo F hx of gallbladder adenocarcinoma, hypothyroidism, C diff, right hepatic lobe mets, partial sbo, transferred from [facility] for surgery, onc recommendations. Pt complaining of persistent epigastric pain and vomiting.

## 2022-01-03 NOTE — ED ADULT NURSE NOTE - OBJECTIVE STATEMENT
Received pt to Rm 18 as transfer from [place] with c/o increasing abdominal pain, nausea and vomiting. Pt has hx of C. Diff, gall bladder CA on chemo last dose approx 1 month ago. Pt has a colonic mass causing a SBO. Pt is A&Ox4, skin warm dry unremarkable, + strong regular radial pulses bi laterally. Pt arrives with R chest port accessed. Pt endorses abdominal pain and nausea. MD at bedside for eval. Will continue to monitor.

## 2022-01-03 NOTE — H&P ADULT - PROBLEM SELECTOR PLAN 3
as above, on chemo. here for potential surgical evaluation. surg onc consulted by ER, will f/u recs  -consult onc in [place]

## 2022-01-03 NOTE — CONSULT NOTE ADULT - SUBJECTIVE AND OBJECTIVE BOX
General Surgery Consult Note  Attending: Dr. Anna  Service: A Team Surgery  [id number]    HPI: 61F with PMHx of hypothyroid, HTN, HLD, Met. ovarian ca (liver, lung) s/p ALYSSA-BSO, appendectomy, cholecystectomy, and omentectomy on Jan 2021, newly diagnosed gallbladder CA on chemotherapy who now presented to [facility] for 6 days of abd pain, vomiting and diarrhea, during her course she was found to cdiff positive (started on po vanc) and found to have a high grade SBO 2/2 large necrotic cecal tumor with metastatic disease. She was transferred to [facility] for further evaluation by surgery.     In the ED, patient was afebrile,, hemodynamically stable, labs remarkable for leukocytosis of 13K but otherwise unremarkable. Surgery consulted for evaluation of patient's high grade SBO.      PAST MEDICAL HISTORY:  PAST MEDICAL & SURGICAL HISTORY:  HTN (hypertension)    Hypothyroidism    Hyperlipidemia    Malignant neoplasm  left ovarian, s/p chemotherapy, gallbladder ca on chemo    H/O breast surgery  I&amp;D left mastitis 27y/o    S/P hysterectomy        ALLERGIES:  Allergies    No Known Allergies    Intolerances    PHYSICAL EXAM:  General: NAD, resting comfortably  HEENT: NC/AT, EOMI, normal hearing, no oral lesions, no LAD, neck supple  Pulmonary: normal resp effort, CTA-B  Cardiovascular: NSR, no murmurs  Abdominal: soft, obese, lower abdominal tenderness to palpation, no organomegaly  Extremities: WWP, normal strength, no clubbing/cyanosis/edema    VITAL SIGNS:  Vital Signs Last 24 Hrs  T(C): 36.6 (03 Jan 2022 21:20), Max: 36.6 (03 Jan 2022 21:20)  T(F): 97.9 (03 Jan 2022 21:20), Max: 97.9 (03 Jan 2022 21:20)  HR: 91 (03 Jan 2022 21:20) (89 - 99)  BP: 110/79 (03 Jan 2022 21:20) (104/67 - 124/72)  BP(mean): --  RR: 18 (03 Jan 2022 21:20) (17 - 20)  SpO2: 99% (03 Jan 2022 21:20) (96% - 99%)    I&O's Summary      LABS:                        11.3   13.12 )-----------( 315      ( 03 Jan 2022 14:56 )             34.1     01-03    137  |  105  |  10  ----------------------------<  102<H>  3.6   |  22  |  0.68    Ca    8.2<L>      03 Jan 2022 14:56  Phos  2.9     01-03  Mg     1.80     01-03    TPro  5.5<L>  /  Alb  2.9<L>  /  TBili  0.3  /  DBili  x   /  AST  18  /  ALT  25  /  AlkPhos  110  01-03    PT/INR - ( 03 Jan 2022 14:56 )   PT: 13.1 sec;   INR: 1.15 ratio         PTT - ( 03 Jan 2022 14:56 )  PTT:27.3 sec    CAPILLARY BLOOD GLUCOSE        LIVER FUNCTIONS - ( 03 Jan 2022 14:56 )  Alb: 2.9 g/dL / Pro: 5.5 g/dL / ALK PHOS: 110 U/L / ALT: 25 U/L / AST: 18 U/L / GGT: x             CULTURES:      RADIOLOGY & ADDITIONAL STUDIES:    CT Abdomen and Pelvis No Cont (01.02.22 @ 13:11)    FINDINGS:  LOWER CHEST: Within normal limits. Central venous catheter tip in the   right atrium.    LIVER: Within normal limits.  BILE DUCTS: Normal caliber.  GALLBLADDER: Within normal limits.  SPLEEN: Within normal limits.  PANCREAS: Within normal limits.  ADRENALS: Within normal limits.  KIDNEYS/URETERS: Within normal limits.    BLADDER: Within normal limits.  REPRODUCTIVE ORGANS:    BOWEL: Again is noted a large circumferential mass of the cecum,   extending along the ascending colon and into the terminal ileum with use   dilatation of the mid and distal small bowel to 4.9 cm. There is   extensive soft tissue nodularity in the right paracolic gutter and   ileocolic mesentery, compatible with metastatic disease. There is fluid   infiltration of the mesentery with new mild fluid throughout the   peritoneal cavity. There is no pneumatosis or extraluminal gas. The   transverse colon and left colon are collapsed. The stomach is collapsed.  PERITONEUM: No ascites.  VESSELS: Within normal limits.  RETROPERITONEUM/LYMPH NODES: Shottyretroperitoneal, para-aortic, and   aortocaval lymph nodes.  ABDOMINAL WALL: Within normal limits.  BONES: Within normal limits.    IMPRESSION: High-grade small bowel obstruction at the level of the cecum   with extensive local spread of malignancy and new mild intraperitoneal   free fluid.    < end of copied text >

## 2022-01-03 NOTE — ED ADULT TRIAGE NOTE - CHIEF COMPLAINT QUOTE
pt Tranf. from [facility] pt with Hx. C. diff. Gall bladder adenocarcinoma on chemo. every 2 wks, pt coming for Adm. for further management of colonic mass with small bowel obs. + nausea/vomiting Zofran given by EMS.  right side chest med. port.

## 2022-01-03 NOTE — ED PROVIDER NOTE - ATTENDING CONTRIBUTION TO CARE
Patient is a 62 yo F with history of HTN, hyperlipidemia, gallbladder adenocarcinoma with mets, hx of hypothryoidism, current C. Diff infection, transferred from  for surgery evaluation 2/2 small bowel obstruction 2/2 large cecal tumor found on CT Scan. Patient speaks Tibetan language, requests her daughter help to give history over the phone. Per daughter, patient has severe pain, has had multiple episodes of vomiting. Patient denies any chest pain or shortness of breath.    VS noted  Gen. uncomfortable, Non toxic   HEENT: EOMI, mmm  Lungs: CTAB/L no C/ W /R   CVS: RRR   Abd; Soft diffuse tenderness, no rebound or guarding  Ext: no edema  Skin: no rash  Neuro AAOx3 non focal clear speech  a/p: obstruction 2/2 mass on CT  - plan to consult surgery, send labs, admit.   - Josafat STUART

## 2022-01-03 NOTE — H&P ADULT - NSHPREVIEWOFSYSTEMS_GEN_ALL_CORE
REVIEW OF SYSTEMS:    CONSTITUTIONAL: No weakness, fevers or chills  EYES/ENT: No visual changes;  No dysphagia  NECK: No pain or stiffness  RESPIRATORY: No cough, wheezing, hemoptysis; No shortness of breath  CARDIOVASCULAR: No chest pain or palpitations; No lower extremity edema  GASTROINTESTINAL: +n/v/d. +epigastric pain   GENITOURINARY: No dysuria, frequency or hematuria  NEUROLOGICAL: No numbness or weakness  PSYCH: no depression or anxiety  SKIN: No itching, burning, rashes, or lesions

## 2022-01-03 NOTE — ED PROVIDER NOTE - PHYSICAL EXAMINATION
GEN: Patient awake alert NAD.   HEENT: normocephalic, atraumatic, EOMI, moist MM, no scleral icterus.   CARDIAC: RRR, S1, S2, no murmur.   PULM: CTA B/L no wheeze, rhonchi, rales.   ABD: soft + ttp epigastric, ND, no rebound no guarding   MSK: Moving all extremities, no edema. 5/5 strength and full ROM in all extremities.    NEURO: A&Ox3, gait normal, no focal neurological deficits, CN 2-12 grossly intact  SKIN: + scattered ecchymosis on abdomen (likely from inpatient Lovenox ppx).

## 2022-01-03 NOTE — ED ADULT NURSE NOTE - CHIEF COMPLAINT QUOTE
pt Tranf. from [facility] pt with Hx. C. diff. Gall bladder adenocarcinoma on chemo. every 2 wks, pt coming for Adm. for further management of colonic mass with small bowel obs. + nausea/vomiting Zofran given by EMS.  right side chest med. port.

## 2022-01-03 NOTE — H&P ADULT - ASSESSMENT
62 y/o woman with pmhx of hypothyroid, HTN, HLD, met. ovarian ca (liver, lung) s/p ALYSSA-BSO, debulking of neoplasm, and newly diagnosed gallbladder ca (unclear metastastic) presents as a transfer from OSH for further management of her cdiff as well as potential surgical management of her metastatic disease and SBO

## 2022-01-04 LAB
ALBUMIN SERPL ELPH-MCNC: 2.2 G/DL — LOW (ref 3.3–5)
ALP SERPL-CCNC: 107 U/L — SIGNIFICANT CHANGE UP (ref 40–120)
ALT FLD-CCNC: 20 U/L — SIGNIFICANT CHANGE UP (ref 4–33)
ANION GAP SERPL CALC-SCNC: 13 MMOL/L — SIGNIFICANT CHANGE UP (ref 7–14)
AST SERPL-CCNC: 17 U/L — SIGNIFICANT CHANGE UP (ref 4–32)
BASOPHILS # BLD AUTO: 0.04 K/UL — SIGNIFICANT CHANGE UP (ref 0–0.2)
BASOPHILS NFR BLD AUTO: 0.3 % — SIGNIFICANT CHANGE UP (ref 0–2)
BILIRUB DIRECT SERPL-MCNC: <0.2 MG/DL — SIGNIFICANT CHANGE UP (ref 0–0.3)
BILIRUB INDIRECT FLD-MCNC: >0 MG/DL — SIGNIFICANT CHANGE UP (ref 0–1)
BILIRUB SERPL-MCNC: 0.2 MG/DL — SIGNIFICANT CHANGE UP (ref 0.2–1.2)
BUN SERPL-MCNC: 12 MG/DL — SIGNIFICANT CHANGE UP (ref 7–23)
CALCIUM SERPL-MCNC: 8 MG/DL — LOW (ref 8.4–10.5)
CHLORIDE SERPL-SCNC: 106 MMOL/L — SIGNIFICANT CHANGE UP (ref 98–107)
CHOLEST SERPL-MCNC: 102 MG/DL — SIGNIFICANT CHANGE UP
CO2 SERPL-SCNC: 14 MMOL/L — LOW (ref 22–31)
CREAT SERPL-MCNC: 0.61 MG/DL — SIGNIFICANT CHANGE UP (ref 0.5–1.3)
EOSINOPHIL # BLD AUTO: 0.08 K/UL — SIGNIFICANT CHANGE UP (ref 0–0.5)
EOSINOPHIL NFR BLD AUTO: 0.6 % — SIGNIFICANT CHANGE UP (ref 0–6)
GLUCOSE SERPL-MCNC: 85 MG/DL — SIGNIFICANT CHANGE UP (ref 70–99)
HCT VFR BLD CALC: 34.5 % — SIGNIFICANT CHANGE UP (ref 34.5–45)
HDLC SERPL-MCNC: 38 MG/DL — LOW
HGB BLD-MCNC: 10.6 G/DL — LOW (ref 11.5–15.5)
IANC: 9.21 K/UL — HIGH (ref 1.5–8.5)
IMM GRANULOCYTES NFR BLD AUTO: 1 % — SIGNIFICANT CHANGE UP (ref 0–1.5)
INR BLD: 1.12 RATIO — SIGNIFICANT CHANGE UP (ref 0.88–1.16)
LIPID PNL WITH DIRECT LDL SERPL: 36 MG/DL — SIGNIFICANT CHANGE UP
LYMPHOCYTES # BLD AUTO: 16.7 % — SIGNIFICANT CHANGE UP (ref 13–44)
LYMPHOCYTES # BLD AUTO: 2.1 K/UL — SIGNIFICANT CHANGE UP (ref 1–3.3)
MAGNESIUM SERPL-MCNC: SIGNIFICANT CHANGE UP MG/DL (ref 1.6–2.6)
MCHC RBC-ENTMCNC: 27.9 PG — SIGNIFICANT CHANGE UP (ref 27–34)
MCHC RBC-ENTMCNC: 30.7 GM/DL — LOW (ref 32–36)
MCV RBC AUTO: 90.8 FL — SIGNIFICANT CHANGE UP (ref 80–100)
MONOCYTES # BLD AUTO: 1.06 K/UL — HIGH (ref 0–0.9)
MONOCYTES NFR BLD AUTO: 8.4 % — SIGNIFICANT CHANGE UP (ref 2–14)
NEUTROPHILS # BLD AUTO: 9.21 K/UL — HIGH (ref 1.8–7.4)
NEUTROPHILS NFR BLD AUTO: 73 % — SIGNIFICANT CHANGE UP (ref 43–77)
NON HDL CHOLESTEROL: 64 MG/DL — SIGNIFICANT CHANGE UP
NRBC # BLD: 0 /100 WBCS — SIGNIFICANT CHANGE UP
NRBC # FLD: 0 K/UL — SIGNIFICANT CHANGE UP
PHOSPHATE SERPL-MCNC: 2.9 MG/DL — SIGNIFICANT CHANGE UP (ref 2.5–4.5)
PLATELET # BLD AUTO: 282 K/UL — SIGNIFICANT CHANGE UP (ref 150–400)
POTASSIUM SERPL-MCNC: 4.3 MMOL/L — SIGNIFICANT CHANGE UP (ref 3.5–5.3)
POTASSIUM SERPL-SCNC: 4.3 MMOL/L — SIGNIFICANT CHANGE UP (ref 3.5–5.3)
PROT SERPL-MCNC: 5.3 G/DL — LOW (ref 6–8.3)
PROTHROM AB SERPL-ACNC: 12.8 SEC — SIGNIFICANT CHANGE UP (ref 10.6–13.6)
RBC # BLD: 3.8 M/UL — SIGNIFICANT CHANGE UP (ref 3.8–5.2)
RBC # FLD: 17.2 % — HIGH (ref 10.3–14.5)
SODIUM SERPL-SCNC: 133 MMOL/L — LOW (ref 135–145)
TRIGL SERPL-MCNC: 142 MG/DL — SIGNIFICANT CHANGE UP
TSH SERPL-MCNC: 1.56 UIU/ML — SIGNIFICANT CHANGE UP (ref 0.27–4.2)
WBC # BLD: 12.61 K/UL — HIGH (ref 3.8–10.5)
WBC # FLD AUTO: 12.61 K/UL — HIGH (ref 3.8–10.5)

## 2022-01-04 PROCEDURE — 99223 1ST HOSP IP/OBS HIGH 75: CPT

## 2022-01-04 RX ORDER — METRONIDAZOLE 500 MG
500 TABLET ORAL ONCE
Refills: 0 | Status: COMPLETED | OUTPATIENT
Start: 2022-01-04 | End: 2022-01-04

## 2022-01-04 RX ORDER — METRONIDAZOLE 500 MG
TABLET ORAL
Refills: 0 | Status: DISCONTINUED | OUTPATIENT
Start: 2022-01-04 | End: 2022-01-15

## 2022-01-04 RX ORDER — METRONIDAZOLE 500 MG
500 TABLET ORAL EVERY 8 HOURS
Refills: 0 | Status: DISCONTINUED | OUTPATIENT
Start: 2022-01-04 | End: 2022-01-15

## 2022-01-04 RX ORDER — SODIUM CHLORIDE 9 MG/ML
1000 INJECTION, SOLUTION INTRAVENOUS
Refills: 0 | Status: DISCONTINUED | OUTPATIENT
Start: 2022-01-04 | End: 2022-01-15

## 2022-01-04 RX ADMIN — Medication 100 MILLIGRAM(S): at 17:28

## 2022-01-04 RX ADMIN — PANTOPRAZOLE SODIUM 40 MILLIGRAM(S): 20 TABLET, DELAYED RELEASE ORAL at 10:35

## 2022-01-04 RX ADMIN — MORPHINE SULFATE 2 MILLIGRAM(S): 50 CAPSULE, EXTENDED RELEASE ORAL at 19:18

## 2022-01-04 RX ADMIN — Medication 37.5 MICROGRAM(S): at 00:00

## 2022-01-04 RX ADMIN — Medication 100 MILLIGRAM(S): at 10:36

## 2022-01-04 RX ADMIN — ENOXAPARIN SODIUM 40 MILLIGRAM(S): 100 INJECTION SUBCUTANEOUS at 10:36

## 2022-01-04 RX ADMIN — SODIUM CHLORIDE 75 MILLILITER(S): 9 INJECTION, SOLUTION INTRAVENOUS at 18:32

## 2022-01-04 RX ADMIN — Medication 37.5 MICROGRAM(S): at 22:14

## 2022-01-04 NOTE — PROGRESS NOTE ADULT - ASSESSMENT
61F w/ known metastatic ovarian and now newly diagnosed gallbladder CA s/p ALYSSA-BSO, appendectomy, omentectomy,, and cholecystectomy who now presents as a transfer from [facility] after bein found to have c.diff colitis along with high grade SBO presumed to be secondary to large cecal necrotic mass.    Recommendations:  - No acute intervention at this time  - Serial abdominal exams  - Monitor bowel function   - NGT for decompression in the setting of SBO  - NPO/IVF  - Medical management of patient's C.diff    A Team Surgery  [phone]

## 2022-01-04 NOTE — PROGRESS NOTE ADULT - SUBJECTIVE AND OBJECTIVE BOX
INES DOCKERY  61y  Female      Patient is a 61y old  Female who presents with a chief complaint of Abdominal pain, cdiff, metastatic disease, SBO (04 Jan 2022 12:34)  Patient was seen and examined.chart reviewed..Admitted with abd.pain,nausea,loose BM.Patient was found to have High grade SBO and cl.diff.colitis.seen by surgery-not a candidate for surgery.low grade temp..no cough,no sob,.Diffuse abd pain.NGT placed    REVIEW OF SYSTEMS:  as above  INTERVAL HPI/OVERNIGHT EVENTS:  T(C): 37.4 (01-04-22 @ 17:33), Max: 37.4 (01-04-22 @ 17:33)  HR: 94 (01-04-22 @ 19:10) (79 - 99)  BP: 128/96 (01-04-22 @ 19:10) (100/68 - 134/87)  RR: 16 (01-04-22 @ 19:10) (16 - 18)  SpO2: 98% (01-04-22 @ 19:10) (97% - 99%)  Wt(kg): --  I&O's Summary    04 Jan 2022 07:01  -  04 Jan 2022 20:52  --------------------------------------------------------  IN: 0 mL / OUT: 200 mL / NET: -200 mL      T(C): 37.4 (01-04-22 @ 17:33), Max: 37.4 (01-04-22 @ 17:33)  HR: 94 (01-04-22 @ 19:10) (79 - 99)  BP: 128/96 (01-04-22 @ 19:10) (100/68 - 134/87)  RR: 16 (01-04-22 @ 19:10) (16 - 18)  SpO2: 98% (01-04-22 @ 19:10) (97% - 99%)  Wt(kg): --Vital Signs Last 24 Hrs  T(C): 37.4 (04 Jan 2022 17:33), Max: 37.4 (04 Jan 2022 17:33)  T(F): 99.4 (04 Jan 2022 17:33), Max: 99.4 (04 Jan 2022 17:33)  HR: 94 (04 Jan 2022 19:10) (79 - 99)  BP: 128/96 (04 Jan 2022 19:10) (100/68 - 134/87)  BP(mean): --  RR: 16 (04 Jan 2022 19:10) (16 - 18)  SpO2: 98% (04 Jan 2022 19:10) (97% - 99%)    LABS:                        10.6   12.61 )-----------( 282      ( 04 Jan 2022 07:43 )             34.5     01-04    133<L>  |  106  |  12  ----------------------------<  85  4.3   |  14<L>  |  0.61    Ca    8.0<L>      04 Jan 2022 07:43  Phos  2.9     01-04  Mg     QNS     01-04    TPro  5.3<L>  /  Alb  2.2<L>  /  TBili  0.2  /  DBili  <0.2  /  AST  17  /  ALT  20  /  AlkPhos  107  01-04    PT/INR - ( 04 Jan 2022 07:43 )   PT: 12.8 sec;   INR: 1.12 ratio         PTT - ( 03 Jan 2022 14:56 )  PTT:27.3 sec    CAPILLARY BLOOD GLUCOSE                PAST MEDICAL & SURGICAL HISTORY:  HTN (hypertension)    Hypothyroidism    Hyperlipidemia    Malignant neoplasm  left ovarian, s/p chemotherapy, 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 1000 milliLiter(s) (75 mL/Hr) IV Continuous <Continuous>  dextrose 5% + sodium chloride 0.9%. 1000 milliLiter(s) (100 mL/Hr) IV Continuous <Continuous>  enoxaparin Injectable 40 milliGRAM(s) SubCutaneous daily  levothyroxine Injectable 37.5 MICROGram(s) IV Push at bedtime  metroNIDAZOLE  IVPB 500 milliGRAM(s) IV Intermittent every 8 hours  metroNIDAZOLE  IVPB      pantoprazole  Injectable 40 milliGRAM(s) IV Push daily    MEDICATIONS  (PRN):  HYDROmorphone  Injectable 1 milliGRAM(s) IV Push every 6 hours PRN Severe Pain (7 - 10)  morphine  - Injectable 2 milliGRAM(s) IV Push every 6 hours PRN Moderate Pain (4 - 6)  ondansetron Injectable 4 milliGRAM(s) IV Push every 8 hours PRN Nausea and/or Vomiting        RADIOLOGY & ADDITIONAL TESTS:    Imaging Personally Reviewed:  [ ] YES  [ ] NO    Consultant(s) Notes Reviewed:  [ x] YES  [ ] NO    PHYSICAL EXAM:  GENERAL: Alert and awake lying in bed in no distress.NGT in place  HEAD:  Atraumatic, Normocephalic  EYES: EOMI, BILLY, conjunctiva and sclera clear  NECK: Supple, No JVD, Normal thyroid  NERVOUS SYSTEM:  Alert & Oriented X3, Motor and sensory systems are intact,   CHEST/LUNG: Bilateral clear breath sounds, no rhochi, no wheezing, no crepitations,  HEART: Regular rate and rhythm; No murmurs, rubs, or gallops  ABDOMEN: Distended,mild tenderness,decreased bowel sounds,no guarding  EXTREMITIES:   Peripheral Pulses are palpable, no  edema        Care Discussed with Consultants/Other Providers [ ] YES  [ ] NO      Code Status: [] Full Code [] DNR [] DNI [] Goals of Care:   Disposition: [] ICU [] Stroke Unit [] RCU []PCU []Floor [] Discharge Home         TEE TatumP

## 2022-01-04 NOTE — CONSULT NOTE ADULT - SUBJECTIVE AND OBJECTIVE BOX
Reason for consult: metastatic carcinoma     HPI:  62 y/o woman with pmhx of hypothyroid, HTN, HLD, met. ovarian ca (liver, lung) s/p ALYSSA-BSO, debulking of neoplasm, and newly diagnosed gallbladder ca (unclear metastastic) presents as a transfer from OSH for further potential surgical management of her metastatic disease. She initially presented to [facility] for 6 days of abd pain, vomiting and diarrhea, during her course she was found to cdiff positive (started on po vanc) and found to have a high grade SBO 2/2 large necrotic cecal tumor with metastatic disease. She reports her pain is controlled on pain meds. Reports 3 bouts of diarrhea today but none in a few hours and currently not nauseous. She has been able to tolerate the PO vanco started at . She denies fever, chills, chest pain, shortness of breath, or dysuria. At baseline she can walk and accomplish all ADLs with no chest pain nor sob. She has no hx of CAD, CHF, CKD, DM or CVA. Pt was given zofran and morphine in the ER (03 Jan 2022 16:51)      Seen in ER. having diarrhea, some mild abd pain.     PAST MEDICAL & SURGICAL HISTORY:  HTN (hypertension)    Hypothyroidism    Hyperlipidemia    Malignant neoplasm  left ovarian, s/p chemotherapy, gallbladder ca on chemo    H/O breast surgery  I&amp;D left mastitis 27y/o    S/P hysterectomy        FAMILY HISTORY:  No pertinent family history in first degree relatives        Alochol: Denied  Smoking: Nonsmoker  Drug Use: Denied  Marital Status:         Allergies    No Known Allergies    Intolerances        MEDICATIONS  (STANDING):  dextrose 5% + sodium chloride 0.9%. 1000 milliLiter(s) (100 mL/Hr) IV Continuous <Continuous>  enoxaparin Injectable 40 milliGRAM(s) SubCutaneous daily  levothyroxine Injectable 37.5 MICROGram(s) IV Push at bedtime  metroNIDAZOLE  IVPB 500 milliGRAM(s) IV Intermittent every 8 hours  metroNIDAZOLE  IVPB      pantoprazole  Injectable 40 milliGRAM(s) IV Push daily    MEDICATIONS  (PRN):  HYDROmorphone  Injectable 1 milliGRAM(s) IV Push every 6 hours PRN Severe Pain (7 - 10)  morphine  - Injectable 2 milliGRAM(s) IV Push every 6 hours PRN Moderate Pain (4 - 6)  ondansetron Injectable 4 milliGRAM(s) IV Push every 8 hours PRN Nausea and/or Vomiting      ROS:     General:  No wt loss, fevers, chills, night sweats, fatigue,   Eyes:  Good vision, no reported pain  ENT:  No sore throat, pain, runny nose, dysphagia  CV:  No pain, palpitations, hypo/hypertension  Resp:  No dyspnea, cough, tachypnea, wheezing  GI:  + abd pain, diarrhea   Muscle:  No pain, weakness  Neuro:  No weakness, tingling, memory problems  Psych:  No fatigue, insomnia, mood problems, depression  Endocrine:  No polyuria, polydipsia, cold/heat intolerance  Heme:  No petechiae, ecchymosis, easy bruisability  Skin:  No rash, tattoos, scars, edema      PHYSICAL EXAM:     GENERAL:  Appears stated age, well-groomed  CHEST:  Full & symmetric excursion, no increased effort, breath sounds clear  HEART: s1s2   ABDOMEN:  + distended, soft. mild tenderness through out   EXTEREMITIES:  no cyanosis,clubbing or edema  SKIN:  No rash/erythema/ecchymoses  NEURO:  Alert, oriented, no asterixis  LN: no palpable Lymphadenopathy                           10.6   12.61 )-----------( 282      ( 04 Jan 2022 07:43 )             34.5       01-04    133<L>  |  106  |  12  ----------------------------<  85  4.3   |  14<L>  |  0.61    Ca    8.0<L>      04 Jan 2022 07:43  Phos  2.9     01-04  Mg     QNS     01-04    TPro  5.3<L>  /  Alb  2.2<L>  /  TBili  0.2  /  DBili  <0.2  /  AST  17  /  ALT  20  /  AlkPhos  107  01-04

## 2022-01-04 NOTE — CONSULT NOTE ADULT - ASSESSMENT
60 yo F hypothyroid, ovarian CA, neoplasm, gallbladder CA, with abd pain, diarrhea  Leukocytosis, Afeb  CT with small bowel obstruction  C diff+  Not able to tolerate PO  Overall,  1) SBO  - NGT  - F/U surgery team  - Hold on standard abx unless signs bacterial infection--avoid exacerbation C diff  2) Leukocytosis  - Trend CBC  - Monitor for signs bacterial process  3) C diff  - Not able to tolerate PO vanco  - Flagyl 500mg q 8 IV  - Monitor stools if producing, monitor for signs megacolon    Chandan Andino MD  Pager [phone]  From 5pm-9am, and on weekends call [phone]

## 2022-01-04 NOTE — PROGRESS NOTE ADULT - SUBJECTIVE AND OBJECTIVE BOX
24h Events:   - Overnight, no acute events    Subjective:   Patient examined at bedside this AM. Reports feeling better compared to yesterday, improved abdominal pain. No nausea, vomiting. Denies diarrhea overnight. Not passing flatus.     Objective:  Vital Signs  T(C): 36.5 (01-04 @ 06:44), Max: 36.9 (01-04 @ 00:01)  HR: 89 (01-04 @ 06:44) (79 - 95)  BP: 134/87 (01-04 @ 06:44) (100/68 - 134/87)  RR: 18 (01-04 @ 06:44) (18 - 20)  SpO2: 97% (01-04 @ 06:44) (97% - 99%)    Physical Exam:  General: alert and oriented, NAD  Resp: airway patent, respirations unlabored  CVS: regular rate and rhythm  Abdomen: softly distended, nontender, no rebound/guarding, NGT with minimal bilious output  Extremities: no edema  Skin: warm, dry, appropriate color    Labs:                        11.3   13.12 )-----------( 315      ( 03 Jan 2022 14:56 )             34.1   01-03    137  |  105  |  10  ----------------------------<  102<H>  3.6   |  22  |  0.68    Ca    8.2<L>      03 Jan 2022 14:56  Phos  2.9     01-03  Mg     1.80     01-03    TPro  5.5<L>  /  Alb  2.9<L>  /  TBili  0.3  /  DBili  x   /  AST  18  /  ALT  25  /  AlkPhos  110  01-03    CAPILLARY BLOOD GLUCOSE          Microbiology:      Medications:   MEDICATIONS  (STANDING):  dextrose 5% + sodium chloride 0.9%. 1000 milliLiter(s) (100 mL/Hr) IV Continuous <Continuous>  enoxaparin Injectable 40 milliGRAM(s) SubCutaneous daily  levothyroxine Injectable 37.5 MICROGram(s) IV Push at bedtime  pantoprazole  Injectable 40 milliGRAM(s) IV Push daily  vancomycin    Solution 125 milliGRAM(s) Oral every 6 hours    MEDICATIONS  (PRN):  HYDROmorphone  Injectable 1 milliGRAM(s) IV Push every 6 hours PRN Severe Pain (7 - 10)  morphine  - Injectable 2 milliGRAM(s) IV Push every 6 hours PRN Moderate Pain (4 - 6)  ondansetron Injectable 4 milliGRAM(s) IV Push every 8 hours PRN Nausea and/or Vomiting

## 2022-01-04 NOTE — CONSULT NOTE ADULT - SUBJECTIVE AND OBJECTIVE BOX
"HPI:  60 y/o woman with pmhx of hypothyroid, HTN, HLD, met. ovarian ca (liver, lung) s/p ALYSSA-BSO, debulking of neoplasm, and newly diagnosed gallbladder ca (unclear metastastic) presents as a transfer from OSH for further potential surgical management of her metastatic disease. She initially presented to [facility] for 6 days of abd pain, vomiting and diarrhea, during her course she was found to cdiff positive (started on po vanc) and found to have a high grade SBO 2/2 large necrotic cecal tumor with metastatic disease. She reports her pain is controlled on pain meds. Reports 3 bouts of diarrhea today but none in a few hours and currently not nauseous. She has been able to tolerate the PO vanco started at . She denies fever, chills, chest pain, shortness of breath, or dysuria. At baseline she can walk and accomplish all ADLs with no chest pain nor sob. She has no hx of CAD, CHF, CKD, DM or CVA. Pt was given zofran and morphine in the ER (03 Jan 2022 16:51)"    Above reviewed. 62 yo F hypothyroid, ovarian CA, neoplasm, gallbladder CA, with abd pain, diarrhea. Transferred from  hospital. Here, concern for SBO. Diagnosed with C diff at OSH. Today, denies diarrhea. Has abd pain and distension. ID called for further eval.    PAST MEDICAL & SURGICAL HISTORY:  HTN (hypertension)    Hypothyroidism    Hyperlipidemia    Malignant neoplasm  left ovarian, s/p chemotherapy, gallbladder ca on chemo    H/O breast surgery  I&amp;D left mastitis 27y/o    S/P hysterectomy    Allergies    No Known Allergies    Intolerances    ANTIMICROBIALS:  metroNIDAZOLE  IVPB 500 every 8 hours  metroNIDAZOLE  IVPB      OTHER MEDS:  dextrose 5% + sodium chloride 0.9%. 1000 milliLiter(s) IV Continuous <Continuous>  enoxaparin Injectable 40 milliGRAM(s) SubCutaneous daily  HYDROmorphone  Injectable 1 milliGRAM(s) IV Push every 6 hours PRN  levothyroxine Injectable 37.5 MICROGram(s) IV Push at bedtime  morphine  - Injectable 2 milliGRAM(s) IV Push every 6 hours PRN  ondansetron Injectable 4 milliGRAM(s) IV Push every 8 hours PRN  pantoprazole  Injectable 40 milliGRAM(s) IV Push daily    SOCIAL HISTORY: No tobacco, no alcohol, no illicit drugs    FAMILY HISTORY:  No pertinent family history in first degree relatives relating to chief complaint    Drug Dosing Weight  Height (cm): 154.9 (03 Jan 2022 11:21)  Weight (kg): 65.8 (26 Dec 2021 08:02)  BMI (kg/m2): 27.4 (03 Jan 2022 11:21)  BSA (m2): 1.65 (03 Jan 2022 11:21)    PE:    Vital Signs Last 24 Hrs  T(C): 36.6 (04 Jan 2022 10:49), Max: 36.9 (04 Jan 2022 00:01)  T(F): 97.8 (04 Jan 2022 10:49), Max: 98.4 (04 Jan 2022 00:01)  HR: 94 (04 Jan 2022 10:49) (79 - 94)  BP: 123/75 (04 Jan 2022 10:49) (100/68 - 134/87)  RR: 16 (04 Jan 2022 10:49) (16 - 19)  SpO2: 98% (04 Jan 2022 10:49) (97% - 99%)    Gen: AOx3, NAD, non-toxic  CV: S1+S2 normal, nontachycardic  Resp: Clear bilat, no resp distress, no crackles/wheezes  Abd: Distended, NGT  Ext: No LE edema, no wounds  : No Mason  IV/Skin: No thrombophlebitis  Msk: No low back pain, no arthralgias, no joint swelling  Neuro: No sensory deficits, no motor deficits    LABS:                        10.6   12.61 )-----------( 282      ( 04 Jan 2022 07:43 )             34.5     01-04    133<L>  |  106  |  12  ----------------------------<  85  4.3   |  14<L>  |  0.61    Ca    8.0<L>      04 Jan 2022 07:43  Phos  2.9     01-04  Mg     QNS     01-04    TPro  5.3<L>  /  Alb  2.2<L>  /  TBili  0.2  /  DBili  <0.2  /  AST  17  /  ALT  20  /  AlkPhos  107  01-04    MICROBIOLOGY:    Clean Catch Clean Catch (Midstream)  12-28-21   <10,000 CFU/mL Normal Urogenital Liv    RADIOLOGY:    1/3 XR:    INTERPRETATION:  Enteric tube tip is in the stomach.    1/2 CT:    IMPRESSION: High-grade small bowel obstruction at the level of the cecum   with extensive local spread of malignancy and new mild intraperitoneal   free fluid.

## 2022-01-04 NOTE — CONSULT NOTE ADULT - ASSESSMENT
INES DOCKERY is a 61y Female who presents with a chief complaint of nausea.    Metastatic Gallbladder Adenocarcinoma  - Patient currently on active treatment gemcitabine + oxaliplatin. Last treatment December 13th.  - No chemotherapy while inpatient. On discharge, to follow-up with Dr. Kyle.    Clostridioides difficile Infection  - Patient tested positive, which can explain her diarrhea.  - ID consulted     Nausea with Cecal Mass  - CT imaging with large necrotic cecal tumor with possible small bowel obstruction.  - surgery on board   = no current intervention from them  - NG tube to suction      Reza Diaz MD  Hematology Oncology   O: [phone]  C: [phone]

## 2022-01-05 DIAGNOSIS — Z51.5 ENCOUNTER FOR PALLIATIVE CARE: ICD-10-CM

## 2022-01-05 DIAGNOSIS — R11.2 NAUSEA WITH VOMITING, UNSPECIFIED: ICD-10-CM

## 2022-01-05 DIAGNOSIS — Z71.89 OTHER SPECIFIED COUNSELING: ICD-10-CM

## 2022-01-05 DIAGNOSIS — R53.81 OTHER MALAISE: ICD-10-CM

## 2022-01-05 DIAGNOSIS — R10.9 UNSPECIFIED ABDOMINAL PAIN: ICD-10-CM

## 2022-01-05 LAB
GLUCOSE BLDC GLUCOMTR-MCNC: 88 MG/DL — SIGNIFICANT CHANGE UP (ref 70–99)
GLUCOSE BLDC GLUCOMTR-MCNC: 93 MG/DL — SIGNIFICANT CHANGE UP (ref 70–99)
GLUCOSE BLDC GLUCOMTR-MCNC: 97 MG/DL — SIGNIFICANT CHANGE UP (ref 70–99)
GLUCOSE BLDC GLUCOMTR-MCNC: 98 MG/DL — SIGNIFICANT CHANGE UP (ref 70–99)
GLUCOSE BLDC GLUCOMTR-MCNC: 99 MG/DL — SIGNIFICANT CHANGE UP (ref 70–99)

## 2022-01-05 PROCEDURE — 99223 1ST HOSP IP/OBS HIGH 75: CPT

## 2022-01-05 PROCEDURE — 99232 SBSQ HOSP IP/OBS MODERATE 35: CPT

## 2022-01-05 PROCEDURE — 99497 ADVNCD CARE PLAN 30 MIN: CPT | Mod: 25

## 2022-01-05 PROCEDURE — 99358 PROLONG SERVICE W/O CONTACT: CPT

## 2022-01-05 RX ORDER — MORPHINE SULFATE 50 MG/1
2 CAPSULE, EXTENDED RELEASE ORAL EVERY 6 HOURS
Refills: 0 | Status: DISCONTINUED | OUTPATIENT
Start: 2022-01-05 | End: 2022-01-12

## 2022-01-05 RX ORDER — INFLUENZA VIRUS VACCINE 15; 15; 15; 15 UG/.5ML; UG/.5ML; UG/.5ML; UG/.5ML
0.5 SUSPENSION INTRAMUSCULAR ONCE
Refills: 0 | Status: DISCONTINUED | OUTPATIENT
Start: 2022-01-05 | End: 2022-01-15

## 2022-01-05 RX ADMIN — Medication 100 MILLIGRAM(S): at 17:20

## 2022-01-05 RX ADMIN — Medication 100 MILLIGRAM(S): at 02:07

## 2022-01-05 RX ADMIN — SODIUM CHLORIDE 75 MILLILITER(S): 9 INJECTION, SOLUTION INTRAVENOUS at 10:23

## 2022-01-05 RX ADMIN — Medication 100 MILLIGRAM(S): at 10:23

## 2022-01-05 RX ADMIN — PANTOPRAZOLE SODIUM 40 MILLIGRAM(S): 20 TABLET, DELAYED RELEASE ORAL at 12:11

## 2022-01-05 RX ADMIN — ENOXAPARIN SODIUM 40 MILLIGRAM(S): 100 INJECTION SUBCUTANEOUS at 12:11

## 2022-01-05 RX ADMIN — Medication 37.5 MICROGRAM(S): at 21:31

## 2022-01-05 NOTE — PROGRESS NOTE ADULT - ASSESSMENT
60 y/o woman with pmhx of hypothyroid, HTN, HLD, met. ovarian ca (liver, lung) s/p ALYSSA-BSO, debulking of neoplasm, and newly diagnosed gallbladder ca (unclear metastastic) presents as a transfer from OSH for further management of her cdiff as well as potential surgical management of her metastatic disease and SBO

## 2022-01-05 NOTE — CONSULT NOTE ADULT - PROBLEM SELECTOR RECOMMENDATION 7
A meeting to discuss advance care planning was held today with patient's daughter   (Due to visitation restrictions in the hospital in light of COVID pandemic, all discussions with the patient/ patient's healthcare proxy or surrogate have been done via telehealth. This is to prevent spread of infection within the hospital and out in the community.)  Please see GOC note.  >16 minutes spent on advanced care planning with family.

## 2022-01-05 NOTE — CONSULT NOTE ADULT - PROBLEM SELECTOR RECOMMENDATION 6
- Patient with metastatic gallbladder adenocarcinoma   - Patient currently on active treatment gemcitabine + oxaliplatin. Last treatment December 13th.  - Reached out to patient's oncology team as above.  - Family awaiting for further input from oncology team and surgery team at this time.   - If no further DMT were offered/pursued, then patient would be a candidate for home hospice services.

## 2022-01-05 NOTE — CONSULT NOTE ADULT - CONVERSATION DETAILS
Patient living at home with her family, including her 2 daughters. Prior to hospitalization, patient independent with ADLs and had an active lifestyle and helping with childcare for grandchildren.    Daughter described patient's oncological history from initial time of diagnosis in Oct/Nov 2020 which was initially thought to be ovarian in origin with subsequent ALYSSA- BSO surgeries in January 2021. She describes cancer later found to be of gallbladder malignancy which patient has been receiving chemotherapy for with last treatment in mid December.     Discussed with daughter the metastatic nature of patient's cancer, which she is aware of. She is aware the patient has small bowel obstruction with cecal mass which is likely related to metastatic nature of cancer. Discussed with daughter per my discussion with her oncologist Dr. Kyle pending further input from surgery regarding for role of surgical intervention to determine role/ candidacy for further DMT.     Daughter shared that she had spoken to surgery team earlier and team had described a surgery intervention which she feels she needs to more about what the procedure would entail and role of procedure and risks to help determine if she would want surgery as she states that if there are more risks involved without prolonging her life she's not sure she would want patient to undergo it but again she states he needs more information from surgery team prior to making any decisions.     Daughter agrees she needs to speak to oncology team Dr. Kyle some more to understand options role/candidacy for further treatment. Discussed that if not a candidate for further DMT or if patient chooses to forgo DMT, hospice care is an alternative; explained role of hospice services in patient care for symptom focused care. Daughter appreciative of information. Daughter states that she would consider second opinion for oncologic care if Patient living at home with her family, including her 2 daughters. Prior to hospitalization, patient independent with ADLs and had an active lifestyle and helping with childcare for grandchildren.    Daughter described patient's oncological history from initial time of diagnosis in Oct/Nov 2020 which was initially thought to be ovarian in origin with subsequent ALYSSA- BSO surgeries in January 2021. She describes cancer later found to be of gallbladder malignancy which patient has been receiving chemotherapy for with last treatment in mid December.     Discussed with daughter the metastatic nature of patient's cancer, which she is aware of. She is aware the patient has small bowel obstruction with cecal mass which is likely related to metastatic nature of cancer. Discussed with daughter per my discussion with her oncologist Dr. Kyle pending further input from surgery regarding for role of surgical intervention to determine role/ candidacy for further DMT.     Daughter shared that she had spoken to surgery team earlier and team had described a surgery intervention which she feels she needs to more about what the procedure would entail and role of procedure and risks to help determine if she would want surgery as she states that if there are more risks involved without prolonging her life she's not sure she would want patient to undergo it but again she states he needs more information from surgery team prior to making any decisions.     Daughter agrees she needs to speak to oncology team Dr. Kyle some more to understand options role/candidacy for further treatment. Discussed that if not a candidate for further DMT or if patient chooses to forgo DMT, hospice care is an alternative; explained role of hospice services in patient care for symptom focused care. Daughter appreciative of information. Daughter states that she would consider second opinion for oncologic care if deemed not a candidate for further DMT.     Emotional support provided to daughter. Patient living at home with her family, including her 2 daughters. Prior to hospitalization, patient independent with ADLs and had an active lifestyle and helping with childcare for grandchildren.    Daughter described patient's oncological history from initial time of diagnosis in Oct/Nov 2020 which was initially thought to be ovarian in origin with subsequent ALYSSA- BSO surgeries in January 2021. She describes cancer later found to be of gallbladder malignancy which patient has been receiving chemotherapy for with last treatment in mid December.     Discussed with daughter the metastatic nature of patient's cancer, which she is aware of. She is aware the patient has small bowel obstruction with cecal mass .     Discussed with daughter per my discussion with her oncologist Dr. Kyle, pending further input from surgery regarding role of surgical intervention to determine role/ candidacy for further DMT.   Daughter shared that she had spoken to surgery team earlier and team had described a "big" surgery intervention which she feels she needs to more about what the procedure would entail and role of procedure and risks to help determine if she would want surgery as she states that if there are more risks involved without prolonging her life expectancy she's not sure she would want patient to undergo it but again she states he needs more information from surgery team prior to making any decisions. She also states she understands surgery is awaiting for C. Diff infection to improve to be able to be able to have surgical interventions if necessary.     Daughter states she needs to speak to oncology team some more to better understand options/ role/candidacy for further treatment; she wants oncologic input regarding prognosis as she states she would not want her mom to undergo interventions that may cause more risks than benefits and would not prolong her life expectancy by much.   Daughter asked about options if deemed not a candidate for further DMT. Explained that if her mother is not a candidate for further DMT or if patient chooses to forgo DMT, hospice care is an alternative; explained role of home hospice services in patient care for symptom focused care. Daughter appreciative of information but expresses she would likely consider second opinion for oncologic care if deemed not a candidate for further DMT as she feels her mother is still young and had an active lifestyle prior to current hospitalization.     Emotional support provided to daughter.

## 2022-01-05 NOTE — PROGRESS NOTE ADULT - ASSESSMENT
61F w/ known metastatic ovarian and now newly diagnosed gallbladder CA s/p ALYSSA-BSO, appendectomy, omentectomy,, and cholecystectomy who now presents as a transfer from [facility] after being found to have c.diff colitis along with high grade SBO presumed to be secondary to large cecal necrotic mass.    Recommendations:  - No acute intervention at this time  - Serial abdominal exams  - Monitor bowel function   - NPO/IVF/NGT  - Medical management of patient's C.diff  - Would obtain Palliative consult given extent of disease to discuss GOC/patient wishes   - Consider palliative venting G-tube pending GOC     A Team Surgery  [id number]

## 2022-01-05 NOTE — CONSULT NOTE ADULT - PROBLEM SELECTOR RECOMMENDATION 9
- patient with intermittent abdominal pain likely related to SBO + underlying metastatic gallbladder CA  - CT Abd/ Pelvis reviewed: SBO noted   - D/C IV Dilaudid 1mg as do not need both IV Dilaudid and IV Morphine at the same time for pain management  - Adjust PRN to IV Morphine 2mg q4-6 PRN moderate-severe pain ; if patient develops renal dysfunction can switch to IV Dilaudid 0.2mg q4-6 PRN

## 2022-01-05 NOTE — PROGRESS NOTE ADULT - SUBJECTIVE AND OBJECTIVE BOX
61 year old woman who a year ago was found to have large pelvic masses, thought to be due to advanced ovarian carcinoma.  She underwent chriss-adjuvant systemic chemotherapy with excellent response and subsequently, she underwent a debulking procedure, including ALYSSA-BSO and omentectomy. At the time of the procedure, there appeared to be a possible implant on the gallbladder, an intra-operative consult was called and an open cholecystectomy was performed. 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 At some point later, the systemic chemotherapy was resumed, but the patient never had a surgical follow up. She was admitted to [facility] about 10 days ago for abdominal distension, nausea, vomiting and diarrhea. She was diagnosed with C. diff colitis, At the same time, a CT of the abdomen and pelvis, revealed extensive disease, with the larger mass at the level of the cecum, but extending along the entire right colon. Additional metastatic implants were noted on the liver, in the left upper quadrant and the left lower quadrant, The small bowel was markedly dilated with the distal colon was collapsed. She was transferred to [facility] for further management, with the expectation that a right hemicolectomy could be performed.  The patient has been started on Flagyl and it seems that her diarrhea may have decreased. In addition, an NG tube was inserted, but the output has been low.  It is also to be noted that the patient is malnourished with a serum albumin of 2.    Subjective: No direct communication could be had with the patient, but we were able to communicate with her through her daughter. She stated that she was told that her mother was responding well to chemotherapy and that she simply needed a right rhiannon-colectomy. Presently, the abdominal pain has somewhat amended but it is still felt.    Objective:   Vital Signs Last 24 Hrs  T(C): 36.6 (2022 19:00), Max: 37.1 (2022 02:51)  T(F): 97.8 (2022 19:00), Max: 98.7 (2022 02:51)  HR: 75 (2022 19:00) (75 - 92)  BP: 124/73 (2022 19:00) (112/59 - 124/73)  BP(mean): --  RR: 15 (2022 19:00) (15 - 16)  SpO2: 98% (2022 19:00) (98% - 99%)    Daily     Daily Weight in k (2022 02:51)    Heent: N/C, AT PER no scleral icterus, No JVD  Pul: clear  Cor: RRR  Abdomen with long midline scar: softly distended, with no garding or rebound tenderness, decresed BS  Extremities: without edema, motor/sensory intact    I&O's Detail    2022 07:01  -  2022 07:00  --------------------------------------------------------  IN:    dextrose 5% + lactated ringers: 300 mL  Total IN: 300 mL    OUT:    Nasogastric/Oral tube (mL): 250 mL    Oral Fluid: 0 mL    Voided (mL): 0 mL  Total OUT: 250 mL    Total NET: 50 mL      2022 07:01  -  2022 23:46  --------------------------------------------------------  IN:    dextrose 5% + lactated ringers: 75 mL  Total IN: 75 mL    OUT:  Total OUT: 0 mL    Total NET: 75 mL          MEDICATIONS  (STANDING):  dextrose 5% + lactated ringers. 1000 milliLiter(s) (75 mL/Hr) IV Continuous <Continuous>  dextrose 5% + sodium chloride 0.9%. 1000 milliLiter(s) (100 mL/Hr) IV Continuous <Continuous>  enoxaparin Injectable 40 milliGRAM(s) SubCutaneous daily  influenza   Vaccine 0.5 milliLiter(s) IntraMuscular once  levothyroxine Injectable 37.5 MICROGram(s) IV Push at bedtime  metroNIDAZOLE  IVPB 500 milliGRAM(s) IV Intermittent every 8 hours  metroNIDAZOLE  IVPB      pantoprazole  Injectable 40 milliGRAM(s) IV Push daily    MEDICATIONS  (PRN):  morphine  - Injectable 2 milliGRAM(s) IV Push every 6 hours PRN Moderate Pain (4 - 6)to severe Pain  ondansetron Injectable 4 milliGRAM(s) IV Push every 8 hours PRN Nausea and/or Vomiting                            10.6   12.61 )-----------( 282      ( 2022 07:43 )             34.5     -    133<L>  |  106  |  12  ----------------------------<  85  4.3   |  14<L>  |  0.61    Ca    8.0<L>      2022 07:43  Phos  2.9     -  Mg     QNS     -    TPro  5.3<L>  /  Alb  2.2<L>  /  TBili  0.2  /  DBili  <0.2  /  AST  17  /  ALT  20  /  AlkPhos  107  -    PT/INR - ( 2022 07:43 )   PT: 12.8 sec;   INR: 1.12 ratio             Radiologic Studies:< from: CT Abdomen and Pelvis No Cont (22 @ 13:11) >  BOWEL: Again is noted a large circumferential mass of the cecum,   extending along the ascending colon and into the terminal ileum with use   dilatation of the mid and distal small bowel to 4.9 cm. There is   extensive soft tissue nodularity in the right paracolic gutter and   ileocolic mesentery, compatible with metastatic disease. There is fluid   infiltration of the mesentery with new mild fluid throughout the   peritoneal cavity. There is no pneumatosis or extraluminal gas. The   transverse colon and left colon are collapsed. The stomach is collapsed.IMPRESSION: High-grade small bowel obstruction at the level of the cecum   with extensive local spread of malignancy and new mild intraperitoneal   free fluid.    < end of copied text >

## 2022-01-05 NOTE — CONSULT NOTE ADULT - PROBLEM SELECTOR RECOMMENDATION 2
- due to SBO   - no further episodes at this time since NG in place to suction  - IV Zofran 4mg PRN if QTC wnl and nausea/emesis recur

## 2022-01-05 NOTE — PATIENT PROFILE ADULT - FALL HARM RISK - HARM RISK INTERVENTIONS

## 2022-01-05 NOTE — CONSULT NOTE ADULT - PROBLEM SELECTOR RECOMMENDATION 4
- CT Abd/ Pelvis significant for High-grade small bowel obstruction at the level of the cecum   - NG to suction in place  - Surgery recommendations appreciated  - management as per primary team

## 2022-01-05 NOTE — CONSULT NOTE ADULT - ASSESSMENT
62 y/o woman with pmhx of hypothyroid, HTN, HLD, met. ovarian ca (liver, lung) s/p ALYSSA-BSO, debulking of neoplasm, and newly diagnosed gallbladder ca (unclear metastastic) presents as a transfer from OSH for further management of her cdiff as well as potential surgical management of her metastatic disease and SBO  Palliative Care consulted for complex decision making  related to goals of care discussions   62 y/o woman with pmhx of hypothyroid, HTN, HLD, s/p ALYSSA-BSO, debulking of neoplasm, and metastatic gallbladder ca presents as a transfer from OSH for further management of her cdiff as well as potential surgical management of her metastatic disease and SBO  Palliative Care consulted for complex decision making  related to goals of care discussions

## 2022-01-05 NOTE — PROGRESS NOTE ADULT - SUBJECTIVE AND OBJECTIVE BOX
INES DOCKERY  61y  Female      Patient is a 61y old  Female who presents with a chief complaint of Abdominal pain, cdiff, metastatic disease, SBO (05 Jan 2022 15:39)  comfortable,nad.NGT in place.no vomiting.mild abd.discomfort,Diarhhoea is improving  no sob,no cp,nofever  REVIEW OF SYSTEMS:  as above  INTERVAL HPI/OVERNIGHT EVENTS:  T(C): 36.4 (01-05-22 @ 12:22), Max: 37.1 (01-05-22 @ 02:51)  HR: 92 (01-05-22 @ 12:22) (85 - 98)  BP: 112/59 (01-05-22 @ 12:22) (112/59 - 128/96)  RR: 16 (01-05-22 @ 12:22) (16 - 17)  SpO2: 99% (01-05-22 @ 12:22) (98% - 99%)  Wt(kg): --  I&O's Summary    04 Jan 2022 07:01  -  05 Jan 2022 07:00  --------------------------------------------------------  IN: 300 mL / OUT: 250 mL / NET: 50 mL      T(C): 36.4 (01-05-22 @ 12:22), Max: 37.1 (01-05-22 @ 02:51)  HR: 92 (01-05-22 @ 12:22) (85 - 98)  BP: 112/59 (01-05-22 @ 12:22) (112/59 - 128/96)  RR: 16 (01-05-22 @ 12:22) (16 - 17)  SpO2: 99% (01-05-22 @ 12:22) (98% - 99%)  Wt(kg): --Vital Signs Last 24 Hrs  T(C): 36.4 (05 Jan 2022 12:22), Max: 37.1 (05 Jan 2022 02:51)  T(F): 97.5 (05 Jan 2022 12:22), Max: 98.7 (05 Jan 2022 02:51)  HR: 92 (05 Jan 2022 12:22) (85 - 98)  BP: 112/59 (05 Jan 2022 12:22) (112/59 - 128/96)  BP(mean): --  RR: 16 (05 Jan 2022 12:22) (16 - 17)  SpO2: 99% (05 Jan 2022 12:22) (98% - 99%)    LABS:                        10.6   12.61 )-----------( 282      ( 04 Jan 2022 07:43 )             34.5     01-04    133<L>  |  106  |  12  ----------------------------<  85  4.3   |  14<L>  |  0.61    Ca    8.0<L>      04 Jan 2022 07:43  Phos  2.9     01-04  Mg     QNS     01-04    TPro  5.3<L>  /  Alb  2.2<L>  /  TBili  0.2  /  DBili  <0.2  /  AST  17  /  ALT  20  /  AlkPhos  107  01-04    PT/INR - ( 04 Jan 2022 07:43 )   PT: 12.8 sec;   INR: 1.12 ratio             CAPILLARY BLOOD GLUCOSE      POCT Blood Glucose.: 99 mg/dL (05 Jan 2022 18:14)  POCT Blood Glucose.: 97 mg/dL (05 Jan 2022 12:05)  POCT Blood Glucose.: 93 mg/dL (05 Jan 2022 08:43)  POCT Blood Glucose.: 88 mg/dL (05 Jan 2022 02:57)            PAST MEDICAL & SURGICAL HISTORY:  HTN (hypertension)    Hypothyroidism    Hyperlipidemia    Malignant neoplasm  left ovarian, s/p chemotherapy, 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 1000 milliLiter(s) (75 mL/Hr) IV Continuous <Continuous>  dextrose 5% + sodium chloride 0.9%. 1000 milliLiter(s) (100 mL/Hr) IV Continuous <Continuous>  enoxaparin Injectable 40 milliGRAM(s) SubCutaneous daily  influenza   Vaccine 0.5 milliLiter(s) IntraMuscular once  levothyroxine Injectable 37.5 MICROGram(s) IV Push at bedtime  metroNIDAZOLE  IVPB 500 milliGRAM(s) IV Intermittent every 8 hours  metroNIDAZOLE  IVPB      pantoprazole  Injectable 40 milliGRAM(s) IV Push daily    MEDICATIONS  (PRN):  morphine  - Injectable 2 milliGRAM(s) IV Push every 6 hours PRN Moderate Pain (4 - 6)to severe Pain  ondansetron Injectable 4 milliGRAM(s) IV Push every 8 hours PRN Nausea and/or Vomiting        RADIOLOGY & ADDITIONAL TESTS:    Imaging Personally Reviewed:  [ ] YES  [ ] NO    Consultant(s) Notes Reviewed:  [x ] YES  [ ] NO    PHYSICAL EXAM:  GENERAL: Alert and awake lying in bed in no distress  HEAD:  Atraumatic, Normocephalic  EYES: EOMI, BILLY, conjunctiva and sclera clear  NECK: Supple, No JVD, Normal thyroid  NERVOUS SYSTEM:  Alert & Oriented X3, Motor and sensory systems are intact,   CHEST/LUNG: Bilateral clear breath sounds, no rhochi, no wheezing, no crepitations,  HEART: Regular rate and rhythm; No murmurs, rubs, or gallops  ABDOMEN: Soft, Nontender,moderately distended,decreased Bowel sounds  EXTREMITIES:   Peripheral Pulses are palpable, no  edema        Care Discussed with Consultants/Other Providers [ ] YES  [ ] NO      Code Status: [] Full Code [] DNR [] DNI [] Goals of Care:   Disposition: [] ICU [] Stroke Unit [] RCU []PCU []Floor [] Discharge Home         TEE TatumP

## 2022-01-05 NOTE — CONSULT NOTE ADULT - PROBLEM SELECTOR RECOMMENDATION 8
Emotional support provided, questions answered.    Discussed case / Coordinated care with patient's primary team and oncology team.   Discussed with ACP of primary team regarding goals of care discussion     Thank you for allowing us to participate in your patient's care. Please page [phone] for any questions/concerns.

## 2022-01-05 NOTE — PROGRESS NOTE ADULT - SUBJECTIVE AND OBJECTIVE BOX
CC: F/U for C diff    Saw/spoke to patient. No fevers, no chills. No new complaints.    Allergies  No Known Allergies    ANTIMICROBIALS:  metroNIDAZOLE  IVPB 500 every 8 hours  metroNIDAZOLE  IVPB      PE:    Vital Signs Last 24 Hrs  T(C): 36.4 (05 Jan 2022 12:22), Max: 37.4 (04 Jan 2022 17:33)  T(F): 97.5 (05 Jan 2022 12:22), Max: 99.4 (04 Jan 2022 17:33)  HR: 92 (05 Jan 2022 12:22) (85 - 98)  BP: 112/59 (05 Jan 2022 12:22) (112/59 - 128/96)  RR: 16 (05 Jan 2022 12:22) (16 - 17)  SpO2: 99% (05 Jan 2022 12:22) (98% - 99%)    Gen: AOx3, NAD, non-toxic, pleasant  CV: S1+S2 normal, nontachycardic  Resp: Clear bilat, no resp distress, no crackles/wheezes  Abd: Soft, nontender, +BS, NGT  Ext: No LE edema, no wounds    LABS:                        10.6   12.61 )-----------( 282      ( 04 Jan 2022 07:43 )             34.5     01-04    133<L>  |  106  |  12  ----------------------------<  85  4.3   |  14<L>  |  0.61    Ca    8.0<L>      04 Jan 2022 07:43  Phos  2.9     01-04  Mg     QNS     01-04    TPro  5.3<L>  /  Alb  2.2<L>  /  TBili  0.2  /  DBili  <0.2  /  AST  17  /  ALT  20  /  AlkPhos  107  01-04    MICROBIOLOGY:    Clean Catch Clean Catch (Midstream)  12-28-21   <10,000 CFU/mL Normal Urogenital Liv  --  --    RADIOLOGY:    1/3 XR:    FINDINGS:    Partially imaged chest port tip terminates in the right atrium. The   enteric tube coils within the stomach with its tip in the fundus of the   stomach. Right upper quadrant cholecystectomy clips.    Multiple gaseous distention of the small bowel loops.    IMPRESSION:    The enteric tube tip in the stomach.

## 2022-01-05 NOTE — PROGRESS NOTE ADULT - ASSESSMENT
60 yo F hypothyroid, ovarian CA, neoplasm, gallbladder CA, with abd pain, diarrhea  Leukocytosis, Afeb  CT with small bowel obstruction  C diff+  Not able to tolerate PO  Overall,  1) SBO  - NGT  - F/U surgery team  - Hold on standard abx unless signs bacterial infection--avoid exacerbation C diff  2) Leukocytosis  - Trend CBC  - Monitor for signs bacterial process  3) C diff  - Not able to tolerate PO vanco  - Flagyl 500mg q 8 IV  - Monitor stools if producing, monitor for signs megacolon    Chandan Andino MD  Pager [phone]  From 5pm-9am, and on weekends call [phone]

## 2022-01-05 NOTE — PROGRESS NOTE ADULT - SUBJECTIVE AND OBJECTIVE BOX
24h Events:   - Overnight, no acute events    Subjective:   Patient examined at bedside this AM. Reports not passing flatus, had 3 BMs overnight. No nausea, vomiting.     Objective:  Vital Signs  T(C): 37.1 (01-05 @ 02:51), Max: 37.4 (01-04 @ 17:33)  HR: 85 (01-05 @ 02:51) (85 - 99)  BP: 119/75 (01-05 @ 02:51) (119/75 - 128/96)  RR: 16 (01-05 @ 02:51) (16 - 18)  SpO2: 98% (01-05 @ 02:51) (98% - 99%)  01-04-22 @ 07:01  -  01-05-22 @ 06:58  --------------------------------------------------------  IN:  Total IN: 0 mL    OUT:    Nasogastric/Oral tube (mL): 250 mL    Voided (mL): 0 mL  Total OUT: 250 mL    Total NET: -250 mL    Physical Exam:  General: alert and oriented, NAD  Resp: airway patent, respirations unlabored  CVS: regular rate and rhythm  Abdomen: softly distended, nontender, no rebound/guarding, NGT with minimal bilious output  Extremities: no edema  Skin: warm, dry, appropriate color    Labs:                        10.6   12.61 )-----------( 282      ( 04 Jan 2022 07:43 )             34.5   01-04    133<L>  |  106  |  12  ----------------------------<  85  4.3   |  14<L>  |  0.61    Ca    8.0<L>      04 Jan 2022 07:43  Phos  2.9     01-04  Mg     QNS     01-04    TPro  5.3<L>  /  Alb  2.2<L>  /  TBili  0.2  /  DBili  <0.2  /  AST  17  /  ALT  20  /  AlkPhos  107  01-04    CAPILLARY BLOOD GLUCOSE      POCT Blood Glucose.: 88 mg/dL (05 Jan 2022 02:57)      Microbiology:      Medications:   MEDICATIONS  (STANDING):  dextrose 5% + lactated ringers. 1000 milliLiter(s) (75 mL/Hr) IV Continuous <Continuous>  dextrose 5% + sodium chloride 0.9%. 1000 milliLiter(s) (100 mL/Hr) IV Continuous <Continuous>  enoxaparin Injectable 40 milliGRAM(s) SubCutaneous daily  influenza   Vaccine 0.5 milliLiter(s) IntraMuscular once  levothyroxine Injectable 37.5 MICROGram(s) IV Push at bedtime  metroNIDAZOLE  IVPB 500 milliGRAM(s) IV Intermittent every 8 hours  metroNIDAZOLE  IVPB      pantoprazole  Injectable 40 milliGRAM(s) IV Push daily    MEDICATIONS  (PRN):  HYDROmorphone  Injectable 1 milliGRAM(s) IV Push every 6 hours PRN Severe Pain (7 - 10)  morphine  - Injectable 2 milliGRAM(s) IV Push every 6 hours PRN Moderate Pain (4 - 6)  ondansetron Injectable 4 milliGRAM(s) IV Push every 8 hours PRN Nausea and/or Vomiting       24h Events:   - Overnight, no acute events    Subjective:   Patient examined at bedside this AM. Reports not passing flatus, had BMs yesterday. No nausea, vomiting. 50cc NGT output overnight.     Objective:  Vital Signs  T(C): 37.1 (01-05 @ 02:51), Max: 37.4 (01-04 @ 17:33)  HR: 85 (01-05 @ 02:51) (85 - 99)  BP: 119/75 (01-05 @ 02:51) (119/75 - 128/96)  RR: 16 (01-05 @ 02:51) (16 - 18)  SpO2: 98% (01-05 @ 02:51) (98% - 99%)  01-04-22 @ 07:01  -  01-05-22 @ 06:58  --------------------------------------------------------  IN:  Total IN: 0 mL    OUT:    Nasogastric/Oral tube (mL): 250 mL    Voided (mL): 0 mL  Total OUT: 250 mL    Total NET: -250 mL    Physical Exam:  General: alert and oriented, NAD  Resp: airway patent, respirations unlabored  CVS: regular rate and rhythm  Abdomen: softly distended, nontender, no rebound/guarding, NGT with minimal bilious output  Extremities: no edema  Skin: warm, dry, appropriate color    Labs:                        10.6   12.61 )-----------( 282      ( 04 Jan 2022 07:43 )             34.5   01-04    133<L>  |  106  |  12  ----------------------------<  85  4.3   |  14<L>  |  0.61    Ca    8.0<L>      04 Jan 2022 07:43  Phos  2.9     01-04  Mg     QNS     01-04    TPro  5.3<L>  /  Alb  2.2<L>  /  TBili  0.2  /  DBili  <0.2  /  AST  17  /  ALT  20  /  AlkPhos  107  01-04    CAPILLARY BLOOD GLUCOSE      POCT Blood Glucose.: 88 mg/dL (05 Jan 2022 02:57)      Microbiology:      Medications:   MEDICATIONS  (STANDING):  dextrose 5% + lactated ringers. 1000 milliLiter(s) (75 mL/Hr) IV Continuous <Continuous>  dextrose 5% + sodium chloride 0.9%. 1000 milliLiter(s) (100 mL/Hr) IV Continuous <Continuous>  enoxaparin Injectable 40 milliGRAM(s) SubCutaneous daily  influenza   Vaccine 0.5 milliLiter(s) IntraMuscular once  levothyroxine Injectable 37.5 MICROGram(s) IV Push at bedtime  metroNIDAZOLE  IVPB 500 milliGRAM(s) IV Intermittent every 8 hours  metroNIDAZOLE  IVPB      pantoprazole  Injectable 40 milliGRAM(s) IV Push daily    MEDICATIONS  (PRN):  HYDROmorphone  Injectable 1 milliGRAM(s) IV Push every 6 hours PRN Severe Pain (7 - 10)  morphine  - Injectable 2 milliGRAM(s) IV Push every 6 hours PRN Moderate Pain (4 - 6)  ondansetron Injectable 4 milliGRAM(s) IV Push every 8 hours PRN Nausea and/or Vomiting

## 2022-01-05 NOTE — PROGRESS NOTE ADULT - ASSESSMENT
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  Malnutrition (S. Albumin of 2)

## 2022-01-05 NOTE — PROGRESS NOTE ADULT - NSPROGADDITIONALINFOA_GEN_ALL_CORE
Mrs. Gupta's daughter was advised that the prognosis was dismal with a likely death within 3 months.  Although an exploratory laparotomy could possibly bring about some palliation by bypassing the cecal mass, this approach may be impeded by the extent of the intra-abdominal process and may results in serious and deadly consequences.  On the other side, a venting PEG procedure is likely to offer comfort with less of a risk.  This was discussed with Dr. Mack as well as the Surgical Chief Resident, Dr. Givens., and a consultation with the Palliative care team will be called.  Further disposition will depend on the patient and her family's decision.

## 2022-01-05 NOTE — CONSULT NOTE ADULT - SUBJECTIVE AND OBJECTIVE BOX
HPI:  60 y/o woman with pmhx of hypothyroid, HTN, HLD, met. ovarian ca (liver, lung) s/p ALYSSA-BSO, debulking of neoplasm, and newly diagnosed gallbladder ca (unclear metastastic) presents as a transfer from OSH for further potential surgical management of her metastatic disease. She initially presented to [facility] for 6 days of abd pain, vomiting and diarrhea, during her course she was found to cdiff positive (started on po vanc) and found to have a high grade SBO 2/2 large necrotic cecal tumor with metastatic disease. She reports her pain is controlled on pain meds. Reports 3 bouts of diarrhea today but none in a few hours and currently not nauseous. She has been able to tolerate the PO vanco started at . She denies fever, chills, chest pain, shortness of breath, or dysuria. At baseline she can walk and accomplish all ADLs with no chest pain nor sob. She has no hx of CAD, CHF, CKD, DM or CVA. Pt was given zofran and morphine in the ER (03 Jan 2022 16:51)    Interval History:   Quentin :   Patient nods yes and no to questions and answered with one worded responses as she feels NG tube is uncomfortable for her to speak. Patient denies any abdominal pain. She states she presented to the hospital due to nausea/emesis, which she states has improved. She gestured towards NG tube indicating she wanted it removed; explained to patient that she has it in place due to obstruction found. She nodded her head yes when asked if she had cancer, but did not elaborate more when asked what type of cancer it was. States she lives with her daughter, and called her daughter to further discuss her medical care as she did not want to speak further.    Reached out to patient's oncology team Dr. Garcia and . Per discussion with Dr. Kyle, awaiting for surgery input to determine role of surgical intervention; if patient able to have surgical intervention, would be able to consider continue DMT; however if not able to have surgical intervention will need to reassess role of further DMT as prognosis would be guarded.     Discussed with primary team to reach out to surgery team for further surgical input.     PERTINENT PM/SXH:   HTN (hypertension)  Hypercholesterolemia  Hypothyroid  Hypothyroidism  Hyperlipidemia  Malignant neoplasm  No significant past surgical history  H/O breast surgery  S/P hysterectomy    FAMILY HISTORY:  No pertinent family history in first degree relatives    ITEMS NOT CHECKED ARE NOT PRESENT    SOCIAL HISTORY:   Significant other/partner[x- ]  Children[ x]  Sabianism/Spirituality: Confucianism  Substance hx: None   Tobacco hx: None  Alcohol hx:  None  Home Opioid hx:  [ ] I-Stop Reference No: [id number]  Living Situation: [x ]Home  [ ]Long term care  [ ]Rehab [ ]Other    ADVANCE DIRECTIVES:    MOLST  [ ]  Living Will  [ ]   DECISION MAKER(s):  [ ] Health Care Proxy(s)  [x ] Surrogate(s)  [ ] Guardian           Name(s): Phone Number(s):  Daughter Alistair Puente: [phone]    BASELINE (I)ADL(s) (prior to admission):  [place]: [x ]Total  [ ] Moderate [ ]Dependent    Allergies    No Known Allergies    Intolerances    MEDICATIONS  (STANDING):  dextrose 5% + lactated ringers. 1000 milliLiter(s) (75 mL/Hr) IV Continuous <Continuous>  dextrose 5% + sodium chloride 0.9%. 1000 milliLiter(s) (100 mL/Hr) IV Continuous <Continuous>  enoxaparin Injectable 40 milliGRAM(s) SubCutaneous daily  influenza   Vaccine 0.5 milliLiter(s) IntraMuscular once  levothyroxine Injectable 37.5 MICROGram(s) IV Push at bedtime  metroNIDAZOLE  IVPB 500 milliGRAM(s) IV Intermittent every 8 hours  metroNIDAZOLE  IVPB      pantoprazole  Injectable 40 milliGRAM(s) IV Push daily    MEDICATIONS  (PRN):  morphine  - Injectable 2 milliGRAM(s) IV Push every 6 hours PRN Moderate Pain (4 - 6)to severe Pain  ondansetron Injectable 4 milliGRAM(s) IV Push every 8 hours PRN Nausea and/or Vomiting      PRESENT SYMPTOMS: [ ]Unable to obtain due to poor mentation   Source if other than patient:  [ ]Family   [ ]Team     Pain: [ ]yes [ ]no  QOL impact -   Location -                    Aggravating factors -  Quality -  Radiation -  Timing-  Severity (0-10 scale):  Minimal acceptable level (0-10 scale):     CPOT:    https://www.[facility].org/getattachment/[email or web address]/Critical-Care-Pain-Observation-Tool-(CPOT)      PAIN AD Score:     http://geriatrictoolkit.[facility]/cog/painad.pdf (press ctrl +  left click to view)    Dyspnea:                           [ ]Mild [ ]Moderate [ ]Severe  Anxiety:                             [ ]Mild [ ]Moderate [ ]Severe  Agitation:                          [ ]Mild [ ]Moderate [ ]Severe  Fatigue:                             [ ]Mild [ ]Moderate [ ]Severe  Nausea:                             [ ]Mild [ ]Moderate [ ]Severe  Loss of appetite:              [ ]Mild [ ]Moderate [ ]Severe  Constipation:                   [ ]Mild [ ]Moderate [ ]Severe  Diarrhea:                          [ ]Mild [ ]Moderate [ ]Severe  Pruritus:                            [ ]Mild [ ]Moderate [ ]Severe  Depression:                      [ ]Mild [ ]Moderate [ ]Severe    Other Symptoms:  [ ]All other review of systems negative     Palliative Performance Status Version 2:         %    http://[email or web address]/[email or web address]_performance_scale_[email or web address]    PHYSICAL EXAM:  Vital Signs Last 24 Hrs  T(C): 36.4 (05 Jan 2022 12:22), Max: 37.1 (05 Jan 2022 02:51)  T(F): 97.5 (05 Jan 2022 12:22), Max: 98.7 (05 Jan 2022 02:51)  HR: 92 (05 Jan 2022 12:22) (85 - 98)  BP: 112/59 (05 Jan 2022 12:22) (112/59 - 128/96)  BP(mean): --  RR: 16 (05 Jan 2022 12:22) (16 - 17)  SpO2: 99% (05 Jan 2022 12:22) (98% - 99%)     I&O's Summary    04 Jan 2022 07:01  -  05 Jan 2022 07:00  --------------------------------------------------------  IN: 300 mL / OUT: 250 mL / NET: 50 mL        GENERAL:  [ ]Alert  [ ]Oriented x   [ ]Lethargic  [ ]Cachexia  [ ]Unarousable  [ ]Verbal  [ ]Non-Verbal  [ ] No Distress  Behavioral:   [ ] Anxiety  [ ] Delirium [ ] Agitation [ ] Calm  [ ] Other  HEENT:  [ ]Normal  [ ] Temporal Wasting  [ ]Dry mouth   [ ]ET Tube/Trach  [ ]Oral lesions  [ ] Mucositis  PULMONARY:   [ ]Clear [ ]Tachypnea  [ ]Audible excessive secretions   [ ]Rhonchi        [ ]Right [ ]Left [ ]Bilateral  [ ]Crackles        [ ]Right [ ]Left [ ]Bilateral  [ ]Wheezing     [ ]Right [ ]Left [ ]Bilateral  [ ]Diminished breath sounds [ ]right [ ]left [ ]bilateral  CARDIOVASCULAR:    [ ]Regular [ ]Irregular [ ]Tachy  [ ]Keyur [ ]Murmur [ ]Other  GASTROINTESTINAL:  [ ]Soft  [ ]Distended   [ ]+BS  [ ]Non tender [ ]Tender  [ ]PEG [ ]OGT/ NGT  Last BM:   GENITOURINARY:  [ ]Normal [ ] Incontinent   [ ]Oliguria/Anuria   [ ]Mason  MUSCULOSKELETAL:   [ ]Normal   [ ]Weakness  [ ]Bed/Wheelchair bound [ ]Edema  [  ] amputation  [  ] contraction  NEUROLOGIC:   [ ]No focal deficits  [ ]Cognitive impairment  [ ]Dysphagia [ ]Dysarthria [ ]Paresis [ ]Other   SKIN: See Nursing Skin Assessment for further details  [ ]Normal    [ ]Rash  [ ]Pressure ulcer(s)       Present on admission [ ]y [ ]n   [  ]  Wound    [  ] hyperpigmentation    CRITICAL CARE:  [ ] Shock Present  [ ]Septic [ ]Cardiogenic [ ]Neurologic [ ]Hypovolemic  [ ]  Vasopressors [ ]  Inotropes   [ ]Respiratory failure present [ ]Mechanical ventilation [ ]Non-invasive ventilatory support [ ]High flow    [ ]Acute  [ ]Chronic [ ]Hypoxic  [ ]Hypercarbic [ ]Other  [ ]Other organ failure     LABS:                        10.6   12.61 )-----------( 282      ( 04 Jan 2022 07:43 )             34.5   01-04    133<L>  |  106  |  12  ----------------------------<  85  4.3   |  14<L>  |  0.61    Ca    8.0<L>      04 Jan 2022 07:43  Phos  2.9     01-04  Mg     QNS     01-04    TPro  5.3<L>  /  Alb  2.2<L>  /  TBili  0.2  /  DBili  <0.2  /  AST  17  /  ALT  20  /  AlkPhos  107  01-04  PT/INR - ( 04 Jan 2022 07:43 )   PT: 12.8 sec;   INR: 1.12 ratio             CAPILLARY BLOOD GLUCOSE      POCT Blood Glucose.: 99 mg/dL (05 Jan 2022 18:14)  POCT Blood Glucose.: 97 mg/dL (05 Jan 2022 12:05)  POCT Blood Glucose.: 93 mg/dL (05 Jan 2022 08:43)  POCT Blood Glucose.: 88 mg/dL (05 Jan 2022 02:57)      RADIOLOGY & ADDITIONAL STUDIES:    PROTEIN CALORIE MALNUTRITION PRESENT: [ ]mild [ ]moderate [ ]severe [ ]underweight [ ]morbid obesity  https://www.andeal.org/vault/2440/web/files/ONC/Table_Clinical%20Characteristics%20to%20Document%20Malnutrition-White%20JV%20et%20al%809913.pdf    Height (cm): 154.9 (01-03-22 @ 11:21), 154.9 (12-26-21 @ 08:02), 154.9 (10-27-21 @ 10:54)  Weight (kg): 73 (01-05-22 @ 02:51), 65.8 (12-26-21 @ 08:02), 66.7 (10-27-21 @ 10:54)  BMI (kg/m2): 30.4 (01-05-22 @ 02:51), 27.4 (01-03-22 @ 11:21), 27.4 (12-26-21 @ 08:02)    [ ]PPSV2 < or = to 30% [ ]significant weight loss  [ ]poor nutritional intake  [ ]anasarca [ ]Artificial Nutrition      REFERRALS:   [ ]Chaplaincy  [ ]Hospice  [ ]Child Life  [ ]Social Work  [ ]Case management [ ]Holistic Therapy     Goals of Care Document:  HPI:  60 y/o woman with pmhx of hypothyroid, HTN, HLD, met. ovarian ca (liver, lung) s/p ALYSSA-BSO, debulking of neoplasm, and newly diagnosed gallbladder ca (unclear metastastic) presents as a transfer from OSH for further potential surgical management of her metastatic disease. She initially presented to [facility] for 6 days of abd pain, vomiting and diarrhea, during her course she was found to cdiff positive (started on po vanc) and found to have a high grade SBO 2/2 large necrotic cecal tumor with metastatic disease. She reports her pain is controlled on pain meds. Reports 3 bouts of diarrhea today but none in a few hours and currently not nauseous. She has been able to tolerate the PO vanco started at . She denies fever, chills, chest pain, shortness of breath, or dysuria. At baseline she can walk and accomplish all ADLs with no chest pain nor sob. She has no hx of CAD, CHF, CKD, DM or CVA. Pt was given zofran and morphine in the ER (03 Jan 2022 16:51)    Interval History:   Quentin :   Patient nods yes and no to questions and answered with one worded responses as she feels NG tube is uncomfortable for her to speak. Patient denies any abdominal pain. She states she presented to the hospital due to nausea/emesis, which she states has improved. She gestured towards NG tube indicating she wanted it removed; explained to patient that she has it in place due to obstruction found. She nodded her head yes when asked if she had cancer, but did not elaborate more when asked what type of cancer it was. States she lives with her daughter, and called her daughter to further discuss her medical care as she did not want to speak further.    Reached out to patient's oncology team Dr. Garcia and . Per discussion with Dr. Kyle, awaiting for surgery input to determine role of surgical intervention; if patient able to have surgical intervention, would be able to consider continue DMT; however if not able to have surgical intervention will need to reassess role of further DMT as prognosis would be guarded.     Discussed with primary team to reach out to surgery team for further surgical input.     PERTINENT PM/SXH:   HTN (hypertension)  Hypercholesterolemia  Hypothyroid  Hypothyroidism  Hyperlipidemia  Malignant neoplasm  No significant past surgical history  H/O breast surgery  S/P hysterectomy    FAMILY HISTORY:  No pertinent family history in first degree relatives    ITEMS NOT CHECKED ARE NOT PRESENT    SOCIAL HISTORY:   Significant other/partner[x- ]  Children[ x]  Restoration/Spirituality: Synagogue  Substance hx: None   Tobacco hx: None  Alcohol hx:  None  Home Opioid hx:  [ ] I-Stop Reference No: [id number]  Living Situation: [x ]Home  [ ]Long term care  [ ]Rehab [ ]Other    ADVANCE DIRECTIVES:    MOLST  [ ]  Living Will  [ ]   DECISION MAKER(s):  [ ] Health Care Proxy(s)  [x ] Surrogate(s)  [ ] Guardian           Name(s): Phone Number(s):  Daughter Alistair Puente: [phone]    BASELINE (I)ADL(s) (prior to admission):  [place]: [x ]Total  [ ] Moderate [ ]Dependent    Allergies    No Known Allergies    Intolerances    MEDICATIONS  (STANDING):  dextrose 5% + lactated ringers. 1000 milliLiter(s) (75 mL/Hr) IV Continuous <Continuous>  dextrose 5% + sodium chloride 0.9%. 1000 milliLiter(s) (100 mL/Hr) IV Continuous <Continuous>  enoxaparin Injectable 40 milliGRAM(s) SubCutaneous daily  influenza   Vaccine 0.5 milliLiter(s) IntraMuscular once  levothyroxine Injectable 37.5 MICROGram(s) IV Push at bedtime  metroNIDAZOLE  IVPB 500 milliGRAM(s) IV Intermittent every 8 hours  metroNIDAZOLE  IVPB      pantoprazole  Injectable 40 milliGRAM(s) IV Push daily    MEDICATIONS  (PRN):  morphine  - Injectable 2 milliGRAM(s) IV Push every 6 hours PRN Moderate Pain (4 - 6)to severe Pain  ondansetron Injectable 4 milliGRAM(s) IV Push every 8 hours PRN Nausea and/or Vomiting      PRESENT SYMPTOMS: [x ] Limited as patient felt NG tube caused discomfort to speak; patient limited her responses to nodding of head and one worded responses  Source if other than patient:  [ ]Family   [ ]Team     Pain: [x ]yes [ ]no  QOL impact - mild  Location -   abdomen                  Aggravating factors - none   Quality - unable to elaborate  Radiation - none   Timing- intermittent   Severity (0-10 scale):unable to elaborate  Minimal acceptable level (0-10 scale):     CPOT:    https://www.[facility].org/getattachment/[email or web address]-[email or web address]/Critical-Care-Pain-Observation-Tool-(CPOT)      PAIN AD Score: 0    http://geriatrictoolkit.[facility]/cog/painad.pdf (press ctrl +  left click to view)    Dyspnea:                           [ ]Mild [ ]Moderate [ ]Severe  Anxiety:                             [ ]Mild [ ]Moderate [ ]Severe  Agitation:                          [ ]Mild [ ]Moderate [ ]Severe  Fatigue:                             [ ]Mild [ ]Moderate [ ]Severe  Nausea:                             [ ]Mild [ ]Moderate [ ]Severe  Loss of appetite:              [ ]Mild [ ]Moderate [ ]Severe  Constipation:                   [ ]Mild [ ]Moderate [ ]Severe  Diarrhea:                          [ ]Mild [ ]Moderate [ ]Severe      Other Symptoms:  [ x]All other review of systems negative - Limited as patient felt NG tube caused discomfort to speak; patient limited her responses to nodding of head and one worded responses      Palliative Performance Status Version 2:  60       %    http://[email or web address]/files/news/[email or web address]_performance_scale_[email or web address].pdf    PHYSICAL EXAM:  Vital Signs Last 24 Hrs  T(C): 36.4 (05 Jan 2022 12:22), Max: 37.1 (05 Jan 2022 02:51)  T(F): 97.5 (05 Jan 2022 12:22), Max: 98.7 (05 Jan 2022 02:51)  HR: 92 (05 Jan 2022 12:22) (85 - 98)  BP: 112/59 (05 Jan 2022 12:22) (112/59 - 128/96)  BP(mean): --  RR: 16 (05 Jan 2022 12:22) (16 - 17)  SpO2: 99% (05 Jan 2022 12:22) (98% - 99%)     I&O's Summary    04 Jan 2022 07:01  -  05 Jan 2022 07:00  --------------------------------------------------------  IN: 300 mL / OUT: 250 mL / NET: 50 mL        GENERAL:  [ x]Alert  [x ]Oriented x 3  [ ]Lethargic  [ ]Cachexia  [ ]Unarousable  [ x]Verbal - limited to one worded responses as NG tube causing discomfort to speak [ ]Non-Verbal  [ x] No Distress  Behavioral:   [ ] Anxiety  [ ] Delirium [ ] Agitation [ x] Calm  [ ] Other  HEENT:  [ ]Normal  [ ] Temporal Wasting  [ x]Dry mouth   [ ]ET Tube/Trach  [ ]Oral lesions  [ ] Mucositis  PULMONARY:   [ ]Clear [ ]Tachypnea  [ ]Audible excessive secretions   [ ]Rhonchi        [ ]Right [ ]Left [ ]Bilateral  [ ]Crackles        [ ]Right [ ]Left [ ]Bilateral  [ ]Wheezing     [ ]Right [ ]Left [ ]Bilateral  [x ]Diminished breath sounds [ ]right [ ]left [ x]bilateral  CARDIOVASCULAR:    [ x]Regular [ ]Irregular [ ]Tachy  [ ]Keyur [ ]Murmur [ ]Other  GASTROINTESTINAL:  [ x]Soft  [ ]Distended   [ ]+BS  [x ]Non tender [ ]Tender  [ ]PEG [x ] NGT  Last BM: 1/5  GENITOURINARY:  [ x]Normal [ ] Incontinent   [ ]Oliguria/Anuria   [ ]Mason  MUSCULOSKELETAL:   [ ]Normal   [ x]Weakness  [ ]Bed/Wheelchair bound [ ]Edema  [  ] amputation  [  ] contraction  NEUROLOGIC:   [ x]No focal deficits  [ ]Cognitive impairment  [ ]Dysphagia [ ]Dysarthria [ ]Paresis [ ]Other   SKIN: See Nursing Skin Assessment for further details  [ x]Normal    [ ]Rash  [ ]Pressure ulcer(s)       Present on admission [ ]y [ ]n   [  ]  Wound    [  ] hyperpigmentation    CRITICAL CARE:  [ ] Shock Present  [ ]Septic [ ]Cardiogenic [ ]Neurologic [ ]Hypovolemic  [ ]  Vasopressors [ ]  Inotropes   [ ]Respiratory failure present [ ]Mechanical ventilation [ ]Non-invasive ventilatory support [ ]High flow    [ ]Acute  [ ]Chronic [ ]Hypoxic  [ ]Hypercarbic [ ]Other  [ ]Other organ failure     LABS:                        10.6   12.61 )-----------( 282      ( 04 Jan 2022 07:43 )             34.5   01-04    133<L>  |  106  |  12  ----------------------------<  85  4.3   |  14<L>  |  0.61    Ca    8.0<L>      04 Jan 2022 07:43  Phos  2.9     01-04  Mg     QNS     01-04    TPro  5.3<L>  /  Alb  2.2<L>  /  TBili  0.2  /  DBili  <0.2  /  AST  17  /  ALT  20  /  AlkPhos  107  01-04  PT/INR - ( 04 Jan 2022 07:43 )   PT: 12.8 sec;   INR: 1.12 ratio         CAPILLARY BLOOD GLUCOSE  POCT Blood Glucose.: 99 mg/dL (05 Jan 2022 18:14)  POCT Blood Glucose.: 97 mg/dL (05 Jan 2022 12:05)  POCT Blood Glucose.: 93 mg/dL (05 Jan 2022 08:43)  POCT Blood Glucose.: 88 mg/dL (05 Jan 2022 02:57)      RADIOLOGY & ADDITIONAL STUDIES:  CT Abd/ Pelvis 1/2/2022  FINDINGS:  LOWER CHEST: Within normal limits. Central venous catheter tip in the right atrium.  LIVER: Within normal limits.  BILE DUCTS: Normal caliber.  GALLBLADDER: Within normal limits.  SPLEEN: Within normal limits.  PANCREAS: Within normal limits.  ADRENALS: Within normal limits.  KIDNEYS/URETERS: Within normal limits.  BLADDER: Within normal limits.  REPRODUCTIVE ORGANS:  BOWEL: Again is noted a large circumferential mass of the cecum,   extending along the ascending colon and into the terminal ileum with use dilatation of the mid and distal small bowel to 4.9 cm. There is   extensive soft tissue nodularity in the right paracolic gutter and   ileocolic mesentery, compatible with metastatic disease. There is fluid   infiltration of the mesentery with new mild fluid throughout the   peritoneal cavity. There is no pneumatosis or extraluminal gas. The   transverse colon and left colon are collapsed. The stomach is collapsed.  PERITONEUM: No ascites.  VESSELS: Within normal limits.  RETROPERITONEUM/LYMPH NODES: Shotty retroperitoneal, para-aortic, and   aortocaval lymph nodes.  ABDOMINAL WALL: Within normal limits.  BONES: Within normal limits.  IMPRESSION: High-grade small bowel obstruction at the level of the cecum with extensive local spread of malignancy and new mild intraperitoneal free fluid.    PROTEIN CALORIE MALNUTRITION PRESENT: [ ]mild [ ]moderate [ ]severe [ ]underweight [ ]morbid obesity  https://www.andeal.org/vault/0540/web/files/ONC/Table_Clinical%20Characteristics%20to%20Document%20Malnutrition-White%20JV%20et%20al%202012.pdf    Height (cm): 154.9 (01-03-22 @ 11:21), 154.9 (12-26-21 @ 08:02), 154.9 (10-27-21 @ 10:54)  Weight (kg): 73 (01-05-22 @ 02:51), 65.8 (12-26-21 @ 08:02), 66.7 (10-27-21 @ 10:54)  BMI (kg/m2): 30.4 (01-05-22 @ 02:51), 27.4 (01-03-22 @ 11:21), 27.4 (12-26-21 @ 08:02)    [ ]PPSV2 < or = to 30% [ ]significant weight loss  [ ]poor nutritional intake  [ ]anasarca [ ]Artificial Nutrition      REFERRALS:   [ ]Chaplaincy  [ ]Hospice  [ ]Child Life  [ ]Social Work  [ x]Case management [ ]Holistic Therapy     ************************************************************************  PALLIATIVE MEDICINE COORDINATION OF CARE DOCUMENTATION  [x] Inpatient Consult  [ ] Other:  ************************************************************************    HPI reviewed       ------------------------------------------------------------------------    MEDICATION REVIEW:  --- Pls refer to current medicatons in the body of this note  ISTOP REFERENCE: [id number]  --- PRN usage: The patient HAS used 1 prn dose of IV Morphine 2mg in past 24 hours  ------------------------------------------------------------------------  COORDINATION OF CARE:  --- Palliative Care consulted for: goals of care  --- Patient assessed: 1/5/2022  --- Patient previously seen by Palliative Care service: NO  ADVANCE CARE PLANNING  --- Code status: FULL CODE  --- MOLST reviewed in chart: NONE  --- HCP/ Surrogate: Daughter Alistair Puente  --- [facility] document found in Alpha: NONE  --- HCP/ Living will/ Other advanced directives in Alpha: NONE  ------------------------------------------------------------------------  CARE PROVIDER DOCUMENTATION:  --- CLEO/MARIUSZ notes: Patient living at home with family.   --- Medicine notes reviewed  --- Surgery notes reviewed: "no acute intervention at this time"   --- Infectious Disease notes reviewed: "- Not able to tolerate PO vanco - Flagyl 500mg q 8 IV - Monitor stools if producing, monitor for signs megacolon"   --- Oncology notes reviewed : Patient with metastatic gallbladder adenocarcinoma "No chemotherapy while inpatient. On discharge, to follow-up with Dr. Kyle."     PLAN OF CARE  --- Known admissions in past year: 2  --- Current admit date: 1/3/2021  --- LOS: 2 days  --- LACE score: 12  --- Current dispo plan: TO BE DETERMINED  ------------------------------------------------------------------------  --- Chart reviewed: 30 Minutes [including time used to gather, review and transfer data to this note]    Prolonged services rendered, as part of this patient's care provided by Palliative Medicine, include: i.chart review for provider and ancillary service documentation, ii.pertinent diagnostics including laboratory and imaging studies,iii. medication review including PRN use, iv. admission history including previous palliative care encounters and GOC notes, v.advance care planning documents including HCP and MOLST forms in Alpha. Part of Palliative Medicine extended evaluation and management also involves coordination of care with our IDT, the primary and consulting elizabeth, and unit CM/SW and Hospice if eligible. Recommendations based on the information gathered and discussed are outline in the AP of our notes.    ************************************************************************   HPI:  62 y/o woman with pmhx of hypothyroid, HTN, HLD, met. ovarian ca (liver, lung) s/p ALYSSA-BSO, debulking of neoplasm, and newly diagnosed gallbladder ca (unclear metastastic) presents as a transfer from OSH for further potential surgical management of her metastatic disease. She initially presented to [facility] for 6 days of abd pain, vomiting and diarrhea, during her course she was found to cdiff positive (started on po vanc) and found to have a high grade SBO 2/2 large necrotic cecal tumor with metastatic disease. She reports her pain is controlled on pain meds. Reports 3 bouts of diarrhea today but none in a few hours and currently not nauseous. She has been able to tolerate the PO vanco started at . She denies fever, chills, chest pain, shortness of breath, or dysuria. At baseline she can walk and accomplish all ADLs with no chest pain nor sob. She has no hx of CAD, CHF, CKD, DM or CVA. Pt was given zofran and morphine in the ER (03 Jan 2022 16:51)    Interval History:   Quentin :   Patient nods yes and no to questions and answered with one worded responses as she feels NG tube is uncomfortable for her to speak. Patient denies any abdominal pain. She states she presented to the hospital due to nausea/emesis, which she states has improved. She gestured towards NG tube indicating she wanted it removed; explained to patient that she has it in place due to obstruction found. She nodded her head yes when asked if she had cancer, but did not elaborate more when asked what type of cancer it was. States she lives with her daughter, and called her daughter to further discuss her medical care as she did not want to speak further.    Reached out to patient's oncology team Dr. Garcia and . Per discussion with Dr. Kyle, awaiting for surgery input to determine role of surgical intervention; if patient able to have surgical intervention, would be able to consider continue DMT; however if not able to have surgical intervention will need to reassess role of further DMT as prognosis would be guarded.     Discussed with primary team to reach out to surgery team for further surgical input.     PERTINENT PM/SXH:   HTN (hypertension)  Hypercholesterolemia  Hypothyroid  Hypothyroidism  Hyperlipidemia  Malignant neoplasm  No significant past surgical history  H/O breast surgery  S/P hysterectomy    FAMILY HISTORY:  No pertinent family history in first degree relatives  No pertinent family history of: cancer.      ITEMS NOT CHECKED ARE NOT PRESENT    SOCIAL HISTORY:   Significant other/partner[x- ]  Children[ x]  Taoism/Spirituality: Spiritism  Substance hx: None   Tobacco hx: None  Alcohol hx:  None  Home Opioid hx:  [ ] I-Stop Reference No: [id number]  Living Situation: [x ]Home  [ ]Long term care  [ ]Rehab [ ]Other    ADVANCE DIRECTIVES:    MOLST  [ ]  Living Will  [ ]   DECISION MAKER(s):  [ ] Health Care Proxy(s)  [x ] Surrogate(s)  [ ] Guardian           Name(s): Phone Number(s):  Daughter Alistair Puente: [phone]    BASELINE (I)ADL(s) (prior to admission):  [place]: [x ]Total  [ ] Moderate [ ]Dependent    Allergies    No Known Allergies    Intolerances    MEDICATIONS  (STANDING):  dextrose 5% + lactated ringers. 1000 milliLiter(s) (75 mL/Hr) IV Continuous <Continuous>  dextrose 5% + sodium chloride 0.9%. 1000 milliLiter(s) (100 mL/Hr) IV Continuous <Continuous>  enoxaparin Injectable 40 milliGRAM(s) SubCutaneous daily  influenza   Vaccine 0.5 milliLiter(s) IntraMuscular once  levothyroxine Injectable 37.5 MICROGram(s) IV Push at bedtime  metroNIDAZOLE  IVPB 500 milliGRAM(s) IV Intermittent every 8 hours  metroNIDAZOLE  IVPB      pantoprazole  Injectable 40 milliGRAM(s) IV Push daily    MEDICATIONS  (PRN):  morphine  - Injectable 2 milliGRAM(s) IV Push every 6 hours PRN Moderate Pain (4 - 6)to severe Pain  ondansetron Injectable 4 milliGRAM(s) IV Push every 8 hours PRN Nausea and/or Vomiting      PRESENT SYMPTOMS: [x ] Limited as patient felt NG tube caused discomfort to speak; patient limited her responses to nodding of head and one worded responses  Source if other than patient:  [ ]Family   [ ]Team     Pain: [x ]yes [ ]no  QOL impact - mild  Location -   abdomen                  Aggravating factors - none   Quality - unable to elaborate  Radiation - none   Timing- intermittent   Severity (0-10 scale):unable to elaborate  Minimal acceptable level (0-10 scale):     CPOT:    https://www.sccm.org/getattachment/[email or web address]-[email or web address]-5i7b-8968k0437u9v/Critical-Care-Pain-Observation-Tool-(CPOT)      PAIN AD Score: 0    http://geriatrictoolkit.[facility]/cog/painad.pdf (press ctrl +  left click to view)    Dyspnea:                           [ ]Mild [ ]Moderate [ ]Severe  Anxiety:                             [ ]Mild [ ]Moderate [ ]Severe  Agitation:                          [ ]Mild [ ]Moderate [ ]Severe  Fatigue:                             [ ]Mild [ ]Moderate [ ]Severe  Nausea:                             [ ]Mild [ ]Moderate [ ]Severe  Loss of appetite:              [ ]Mild [ ]Moderate [ ]Severe  Constipation:                   [ ]Mild [ ]Moderate [ ]Severe  Diarrhea:                          [ ]Mild [ ]Moderate [ ]Severe      Other Symptoms:  [ x]All other review of systems negative - Limited as patient felt NG tube caused discomfort to speak; patient limited her responses to nodding of head and one worded responses      Palliative Performance Status Version 2:  60       %    http://[facility].org/files/news/palliative_performance_scale_ppsv2.pdf    PHYSICAL EXAM:  Vital Signs Last 24 Hrs  T(C): 36.4 (05 Jan 2022 12:22), Max: 37.1 (05 Jan 2022 02:51)  T(F): 97.5 (05 Jan 2022 12:22), Max: 98.7 (05 Jan 2022 02:51)  HR: 92 (05 Jan 2022 12:22) (85 - 98)  BP: 112/59 (05 Jan 2022 12:22) (112/59 - 128/96)  BP(mean): --  RR: 16 (05 Jan 2022 12:22) (16 - 17)  SpO2: 99% (05 Jan 2022 12:22) (98% - 99%)     I&O's Summary    04 Jan 2022 07:01  -  05 Jan 2022 07:00  --------------------------------------------------------  IN: 300 mL / OUT: 250 mL / NET: 50 mL        GENERAL:  [ x]Alert  [x ]Oriented x 3  [ ]Lethargic  [ ]Cachexia  [ ]Unarousable  [ x]Verbal - limited to one worded responses as NG tube causing discomfort to speak [ ]Non-Verbal  [ x] No Distress  Behavioral:   [ ] Anxiety  [ ] Delirium [ ] Agitation [ x] Calm  [ ] Other  HEENT:  [ ]Normal  [ ] Temporal Wasting  [ x]Dry mouth   [ ]ET Tube/Trach  [ ]Oral lesions  [ ] Mucositis  PULMONARY:   [ ]Clear [ ]Tachypnea  [ ]Audible excessive secretions   [ ]Rhonchi        [ ]Right [ ]Left [ ]Bilateral  [ ]Crackles        [ ]Right [ ]Left [ ]Bilateral  [ ]Wheezing     [ ]Right [ ]Left [ ]Bilateral  [x ]Diminished breath sounds [ ]right [ ]left [ x]bilateral  CARDIOVASCULAR:    [ x]Regular [ ]Irregular [ ]Tachy  [ ]Keyur [ ]Murmur [ ]Other  GASTROINTESTINAL:  [ x]Soft  [ ]Distended   [ ]+BS  [x ]Non tender [ ]Tender  [ ]PEG [x ] NGT  Last BM: 1/5  GENITOURINARY:  [ x]Normal [ ] Incontinent   [ ]Oliguria/Anuria   [ ]Mason  MUSCULOSKELETAL:   [ ]Normal   [ x]Weakness  [ ]Bed/Wheelchair bound [ ]Edema  [  ] amputation  [  ] contraction  NEUROLOGIC:   [ x]No focal deficits  [ ]Cognitive impairment  [ ]Dysphagia [ ]Dysarthria [ ]Paresis [ ]Other   SKIN: See Nursing Skin Assessment for further details  [ x]Normal    [ ]Rash  [ ]Pressure ulcer(s)       Present on admission [ ]y [ ]n   [  ]  Wound    [  ] hyperpigmentation    CRITICAL CARE:  [ ] Shock Present  [ ]Septic [ ]Cardiogenic [ ]Neurologic [ ]Hypovolemic  [ ]  Vasopressors [ ]  Inotropes   [ ]Respiratory failure present [ ]Mechanical ventilation [ ]Non-invasive ventilatory support [ ]High flow    [ ]Acute  [ ]Chronic [ ]Hypoxic  [ ]Hypercarbic [ ]Other  [ ]Other organ failure     LABS:                        10.6   12.61 )-----------( 282      ( 04 Jan 2022 07:43 )             34.5   01-04    133<L>  |  106  |  12  ----------------------------<  85  4.3   |  14<L>  |  0.61    Ca    8.0<L>      04 Jan 2022 07:43  Phos  2.9     01-04  Mg     QNS     01-04    TPro  5.3<L>  /  Alb  2.2<L>  /  TBili  0.2  /  DBili  <0.2  /  AST  17  /  ALT  20  /  AlkPhos  107  01-04  PT/INR - ( 04 Jan 2022 07:43 )   PT: 12.8 sec;   INR: 1.12 ratio         CAPILLARY BLOOD GLUCOSE  POCT Blood Glucose.: 99 mg/dL (05 Jan 2022 18:14)  POCT Blood Glucose.: 97 mg/dL (05 Jan 2022 12:05)  POCT Blood Glucose.: 93 mg/dL (05 Jan 2022 08:43)  POCT Blood Glucose.: 88 mg/dL (05 Jan 2022 02:57)      RADIOLOGY & ADDITIONAL STUDIES:  CT Abd/ Pelvis 1/2/2022  FINDINGS:  LOWER CHEST: Within normal limits. Central venous catheter tip in the right atrium.  LIVER: Within normal limits.  BILE DUCTS: Normal caliber.  GALLBLADDER: Within normal limits.  SPLEEN: Within normal limits.  PANCREAS: Within normal limits.  ADRENALS: Within normal limits.  KIDNEYS/URETERS: Within normal limits.  BLADDER: Within normal limits.  REPRODUCTIVE ORGANS:  BOWEL: Again is noted a large circumferential mass of the cecum,   extending along the ascending colon and into the terminal ileum with use dilatation of the mid and distal small bowel to 4.9 cm. There is   extensive soft tissue nodularity in the right paracolic gutter and   ileocolic mesentery, compatible with metastatic disease. There is fluid   infiltration of the mesentery with new mild fluid throughout the   peritoneal cavity. There is no pneumatosis or extraluminal gas. The   transverse colon and left colon are collapsed. The stomach is collapsed.  PERITONEUM: No ascites.  VESSELS: Within normal limits.  RETROPERITONEUM/LYMPH NODES: Shotty retroperitoneal, para-aortic, and   aortocaval lymph nodes.  ABDOMINAL WALL: Within normal limits.  BONES: Within normal limits.  IMPRESSION: High-grade small bowel obstruction at the level of the cecum with extensive local spread of malignancy and new mild intraperitoneal free fluid.    PROTEIN CALORIE MALNUTRITION PRESENT: [ ]mild [ ]moderate [ ]severe [ ]underweight [ ]morbid obesity  https://www.andeal.org/vault/1844/web/files/ONC/Table_Clinical%20Characteristics%20to%20Document%20Malnutrition-White%20JV%20et%20al%778448.pdf    Height (cm): 154.9 (01-03-22 @ 11:21), 154.9 (12-26-21 @ 08:02), 154.9 (10-27-21 @ 10:54)  Weight (kg): 73 (01-05-22 @ 02:51), 65.8 (12-26-21 @ 08:02), 66.7 (10-27-21 @ 10:54)  BMI (kg/m2): 30.4 (01-05-22 @ 02:51), 27.4 (01-03-22 @ 11:21), 27.4 (12-26-21 @ 08:02)    [ ]PPSV2 < or = to 30% [ ]significant weight loss  [ ]poor nutritional intake  [ ]anasarca [ ]Artificial Nutrition      REFERRALS:   [ ]Chaplaincy  [ ]Hospice  [ ]Child Life  [ ]Social Work  [ x]Case management [ ]Holistic Therapy     ************************************************************************  PALLIATIVE MEDICINE COORDINATION OF CARE DOCUMENTATION  [x] Inpatient Consult  [ ] Other:  ************************************************************************    HPI reviewed       ------------------------------------------------------------------------    MEDICATION REVIEW:  --- Pls refer to current medicatons in the body of this note  ISTOP REFERENCE: [id number]  --- PRN usage: The patient HAS used 1 prn dose of IV Morphine 2mg in past 24 hours  ------------------------------------------------------------------------  COORDINATION OF CARE:  --- Palliative Care consulted for: goals of care  --- Patient assessed: 1/5/2022  --- Patient previously seen by Palliative Care service: NO  ADVANCE CARE PLANNING  --- Code status: FULL CODE  --- MOLST reviewed in chart: NONE  --- HCP/ Surrogate: Daughter Alistair Puente  --- [facility] document found in Alpha: NONE  --- HCP/ Living will/ Other advanced directives in Alpha: NONE  ------------------------------------------------------------------------  CARE PROVIDER DOCUMENTATION:  --- CLEO/MARIUSZ notes: Patient living at home with family.   --- Medicine notes reviewed  --- Surgery notes reviewed: "no acute intervention at this time"   --- Infectious Disease notes reviewed: "- Not able to tolerate PO vanco - Flagyl 500mg q 8 IV - Monitor stools if producing, monitor for signs megacolon"   --- Oncology notes reviewed : Patient with metastatic gallbladder adenocarcinoma "No chemotherapy while inpatient. On discharge, to follow-up with Dr. Kyle."     PLAN OF CARE  --- Known admissions in past year: 2  --- Current admit date: 1/3/2021  --- LOS: 2 days  --- LACE score: 12  --- Current dispo plan: TO BE DETERMINED  ------------------------------------------------------------------------  --- Chart reviewed: 30 Minutes [including time used to gather, review and transfer data to this note]    Prolonged services rendered, as part of this patient's care provided by Palliative Medicine, include: i.chart review for provider and ancillary service documentation, ii.pertinent diagnostics including laboratory and imaging studies,iii. medication review including PRN use, iv. admission history including previous palliative care encounters and GOC notes, v.advance care planning documents including HCP and MOLST forms in Alpha. Part of Palliative Medicine extended evaluation and management also involves coordination of care with our IDT, the primary and consulting elizabeth, and unit CM/SW and Hospice if eligible. Recommendations based on the information gathered and discussed are outline in the AP of our notes.    ************************************************************************   HPI:  60 y/o woman with pmhx of hypothyroid, HTN, HLD, met. ovarian ca (liver, lung) s/p ALYSSA-BSO, debulking of neoplasm, and newly diagnosed gallbladder ca (unclear metastastic) presents as a transfer from OSH for further potential surgical management of her metastatic disease. She initially presented to [facility] for 6 days of abd pain, vomiting and diarrhea, during her course she was found to cdiff positive (started on po vanc) and found to have a high grade SBO 2/2 large necrotic cecal tumor with metastatic disease. She reports her pain is controlled on pain meds. Reports 3 bouts of diarrhea today but none in a few hours and currently not nauseous. She has been able to tolerate the PO vanco started at . She denies fever, chills, chest pain, shortness of breath, or dysuria. At baseline she can walk and accomplish all ADLs with no chest pain nor sob. She has no hx of CAD, CHF, CKD, DM or CVA. Pt was given zofran and morphine in the ER (03 Jan 2022 16:51)    Interval History:   Quentin : [id number]  Patient nods yes and no to questions and answered with one worded responses as she feels NG tube is uncomfortable for her to speak. Patient denies any abdominal pain. She states she presented to the hospital due to nausea/emesis, which she states has improved. She gestured towards NG tube indicating she wanted it removed; explained to patient that she has it in place due to obstruction found. She nodded her head yes when asked if she had cancer, but did not elaborate more when asked what type of cancer it was. States she lives with her daughter, and called her daughter to further discuss her medical care as she did not want to speak further.    Reached out to patient's oncology team Dr. Garcia and . Per discussion with Dr. Kyle, awaiting for surgery input to determine role of surgical intervention; if patient able to have surgical intervention, would be able to consider continue DMT; however if not able to have surgical intervention will need to reassess role of further DMT as prognosis would be guarded.     Discussed with primary team to reach out to surgery team for further surgical input.     PERTINENT PM/SXH:   HTN (hypertension)  Hypercholesterolemia  Hypothyroid  Hypothyroidism  Hyperlipidemia  Malignant neoplasm  No significant past surgical history  H/O breast surgery  S/P hysterectomy    FAMILY HISTORY:  No pertinent family history in first degree relatives  No pertinent family history of: cancer.      ITEMS NOT CHECKED ARE NOT PRESENT    SOCIAL HISTORY:   Significant other/partner[x- ]  Children[ x]  Oriental orthodox/Spirituality: Rastafarian  Substance hx: None   Tobacco hx: None  Alcohol hx:  None  Home Opioid hx:  [ ] I-Stop Reference No: [id number]  Living Situation: [x ]Home  [ ]Long term care  [ ]Rehab [ ]Other    ADVANCE DIRECTIVES:    MOLST  [ ]  Living Will  [ ]   DECISION MAKER(s):  [ ] Health Care Proxy(s)  [x ] Surrogate(s)  [ ] Guardian           Name(s): Phone Number(s):  Daughter Alistair Puente: [phone]    BASELINE (I)ADL(s) (prior to admission):  [place]: [x ]Total  [ ] Moderate [ ]Dependent    Allergies    No Known Allergies    Intolerances    MEDICATIONS  (STANDING):  dextrose 5% + lactated ringers. 1000 milliLiter(s) (75 mL/Hr) IV Continuous <Continuous>  dextrose 5% + sodium chloride 0.9%. 1000 milliLiter(s) (100 mL/Hr) IV Continuous <Continuous>  enoxaparin Injectable 40 milliGRAM(s) SubCutaneous daily  influenza   Vaccine 0.5 milliLiter(s) IntraMuscular once  levothyroxine Injectable 37.5 MICROGram(s) IV Push at bedtime  metroNIDAZOLE  IVPB 500 milliGRAM(s) IV Intermittent every 8 hours  metroNIDAZOLE  IVPB      pantoprazole  Injectable 40 milliGRAM(s) IV Push daily    MEDICATIONS  (PRN):  morphine  - Injectable 2 milliGRAM(s) IV Push every 6 hours PRN Moderate Pain (4 - 6)to severe Pain  ondansetron Injectable 4 milliGRAM(s) IV Push every 8 hours PRN Nausea and/or Vomiting      PRESENT SYMPTOMS: [x ] Limited as patient felt NG tube caused discomfort to speak; patient limited her responses to nodding of head and one worded responses  Source if other than patient:  [ ]Family   [ ]Team     Pain: [x ]yes [ ]no  QOL impact - mild  Location -   abdomen                  Aggravating factors - none   Quality - unable to elaborate  Radiation - none   Timing- intermittent   Severity (0-10 scale):unable to elaborate  Minimal acceptable level (0-10 scale):     CPOT:    https://www.sccm.org/getattachment/[email or web address]-1o2v-4g5x-7670z4050u6q/Critical-Care-Pain-Observation-Tool-(CPOT)      PAIN AD Score: 0    http://geriatrictoolkit.[facility]/cog/painad.pdf (press ctrl +  left click to view)    Dyspnea:                           [ ]Mild [ ]Moderate [ ]Severe  Anxiety:                             [ ]Mild [ ]Moderate [ ]Severe  Agitation:                          [ ]Mild [ ]Moderate [ ]Severe  Fatigue:                             [ ]Mild [ ]Moderate [ ]Severe  Nausea:                             [ ]Mild [ ]Moderate [ ]Severe  Loss of appetite:              [ ]Mild [ ]Moderate [ ]Severe  Constipation:                   [ ]Mild [ ]Moderate [ ]Severe  Diarrhea:                          [ ]Mild [ ]Moderate [ ]Severe      Other Symptoms:  [ x]All other review of systems negative - Limited as patient felt NG tube caused discomfort to speak; patient limited her responses to nodding of head and one worded responses      Palliative Performance Status Version 2:  60       %    http://npcrc.org/files/news/palliative_performance_scale_ppsv2.pdf    PHYSICAL EXAM:  Vital Signs Last 24 Hrs  T(C): 36.4 (05 Jan 2022 12:22), Max: 37.1 (05 Jan 2022 02:51)  T(F): 97.5 (05 Jan 2022 12:22), Max: 98.7 (05 Jan 2022 02:51)  HR: 92 (05 Jan 2022 12:22) (85 - 98)  BP: 112/59 (05 Jan 2022 12:22) (112/59 - 128/96)  BP(mean): --  RR: 16 (05 Jan 2022 12:22) (16 - 17)  SpO2: 99% (05 Jan 2022 12:22) (98% - 99%)     I&O's Summary    04 Jan 2022 07:01  -  05 Jan 2022 07:00  --------------------------------------------------------  IN: 300 mL / OUT: 250 mL / NET: 50 mL        GENERAL:  [ x]Alert  [x ]Oriented x 3  [ ]Lethargic  [ ]Cachexia  [ ]Unarousable  [ x]Verbal - limited to one worded responses as NG tube causing discomfort to speak [ ]Non-Verbal  [ x] No Distress  Behavioral:   [ ] Anxiety  [ ] Delirium [ ] Agitation [ x] Calm  [ ] Other  HEENT:  [ ]Normal  [ ] Temporal Wasting  [ x]Dry mouth   [ ]ET Tube/Trach  [ ]Oral lesions  [ ] Mucositis  PULMONARY:   [ ]Clear [ ]Tachypnea  [ ]Audible excessive secretions   [ ]Rhonchi        [ ]Right [ ]Left [ ]Bilateral  [ ]Crackles        [ ]Right [ ]Left [ ]Bilateral  [ ]Wheezing     [ ]Right [ ]Left [ ]Bilateral  [x ]Diminished breath sounds [ ]right [ ]left [ x]bilateral  CARDIOVASCULAR:    [ x]Regular [ ]Irregular [ ]Tachy  [ ]Keyur [ ]Murmur [ ]Other  GASTROINTESTINAL:  [ x]Soft  [ ]Distended   [ ]+BS  [x ]Non tender [ ]Tender  [ ]PEG [x ] NGT  Last BM: 1/5  GENITOURINARY:  [ x]Normal [ ] Incontinent   [ ]Oliguria/Anuria   [ ]Mason  MUSCULOSKELETAL:   [ ]Normal   [ x]Weakness  [ ]Bed/Wheelchair bound [ ]Edema  [  ] amputation  [  ] contraction  NEUROLOGIC:   [ x]No focal deficits  [ ]Cognitive impairment  [ ]Dysphagia [ ]Dysarthria [ ]Paresis [ ]Other   SKIN: See Nursing Skin Assessment for further details  [ x]Normal    [ ]Rash  [ ]Pressure ulcer(s)       Present on admission [ ]y [ ]n   [  ]  Wound    [  ] hyperpigmentation    CRITICAL CARE:  [ ] Shock Present  [ ]Septic [ ]Cardiogenic [ ]Neurologic [ ]Hypovolemic  [ ]  Vasopressors [ ]  Inotropes   [ ]Respiratory failure present [ ]Mechanical ventilation [ ]Non-invasive ventilatory support [ ]High flow    [ ]Acute  [ ]Chronic [ ]Hypoxic  [ ]Hypercarbic [ ]Other  [ ]Other organ failure     LABS:                        10.6   12.61 )-----------( 282      ( 04 Jan 2022 07:43 )             34.5   01-04    133<L>  |  106  |  12  ----------------------------<  85  4.3   |  14<L>  |  0.61    Ca    8.0<L>      04 Jan 2022 07:43  Phos  2.9     01-04  Mg     QNS     01-04    TPro  5.3<L>  /  Alb  2.2<L>  /  TBili  0.2  /  DBili  <0.2  /  AST  17  /  ALT  20  /  AlkPhos  107  01-04  PT/INR - ( 04 Jan 2022 07:43 )   PT: 12.8 sec;   INR: 1.12 ratio         CAPILLARY BLOOD GLUCOSE  POCT Blood Glucose.: 99 mg/dL (05 Jan 2022 18:14)  POCT Blood Glucose.: 97 mg/dL (05 Jan 2022 12:05)  POCT Blood Glucose.: 93 mg/dL (05 Jan 2022 08:43)  POCT Blood Glucose.: 88 mg/dL (05 Jan 2022 02:57)      RADIOLOGY & ADDITIONAL STUDIES:  CT Abd/ Pelvis 1/2/2022  FINDINGS:  LOWER CHEST: Within normal limits. Central venous catheter tip in the right atrium.  LIVER: Within normal limits.  BILE DUCTS: Normal caliber.  GALLBLADDER: Within normal limits.  SPLEEN: Within normal limits.  PANCREAS: Within normal limits.  ADRENALS: Within normal limits.  KIDNEYS/URETERS: Within normal limits.  BLADDER: Within normal limits.  REPRODUCTIVE ORGANS:  BOWEL: Again is noted a large circumferential mass of the cecum,   extending along the ascending colon and into the terminal ileum with use dilatation of the mid and distal small bowel to 4.9 cm. There is   extensive soft tissue nodularity in the right paracolic gutter and   ileocolic mesentery, compatible with metastatic disease. There is fluid   infiltration of the mesentery with new mild fluid throughout the   peritoneal cavity. There is no pneumatosis or extraluminal gas. The   transverse colon and left colon are collapsed. The stomach is collapsed.  PERITONEUM: No ascites.  VESSELS: Within normal limits.  RETROPERITONEUM/LYMPH NODES: Shotty retroperitoneal, para-aortic, and   aortocaval lymph nodes.  ABDOMINAL WALL: Within normal limits.  BONES: Within normal limits.  IMPRESSION: High-grade small bowel obstruction at the level of the cecum with extensive local spread of malignancy and new mild intraperitoneal free fluid.    PROTEIN CALORIE MALNUTRITION PRESENT: [ ]mild [ ]moderate [ ]severe [ ]underweight [ ]morbid obesity  https://www.andeal.org/vault/7586/web/files/ONC/Table_Clinical%20Characteristics%20to%20Document%20Malnutrition-White%20JV%20et%20al%369353.pdf    Height (cm): 154.9 (01-03-22 @ 11:21), 154.9 (12-26-21 @ 08:02), 154.9 (10-27-21 @ 10:54)  Weight (kg): 73 (01-05-22 @ 02:51), 65.8 (12-26-21 @ 08:02), 66.7 (10-27-21 @ 10:54)  BMI (kg/m2): 30.4 (01-05-22 @ 02:51), 27.4 (01-03-22 @ 11:21), 27.4 (12-26-21 @ 08:02)    [ ]PPSV2 < or = to 30% [ ]significant weight loss  [ ]poor nutritional intake  [ ]anasarca [ ]Artificial Nutrition      REFERRALS:   [ ]Chaplaincy  [ ]Hospice  [ ]Child Life  [ ]Social Work  [ x]Case management [ ]Holistic Therapy     ************************************************************************  PALLIATIVE MEDICINE COORDINATION OF CARE DOCUMENTATION  [x] Inpatient Consult  [ ] Other:  ************************************************************************    HPI reviewed       ------------------------------------------------------------------------    MEDICATION REVIEW:  --- Pls refer to current medicatons in the body of this note  ISTOP REFERENCE: [id number]  --- PRN usage: The patient HAS used 1 prn dose of IV Morphine 2mg in past 24 hours  ------------------------------------------------------------------------  COORDINATION OF CARE:  --- Palliative Care consulted for: goals of care  --- Patient assessed: 1/5/2022  --- Patient previously seen by Palliative Care service: NO  ADVANCE CARE PLANNING  --- Code status: FULL CODE  --- MOLST reviewed in chart: NONE  --- HCP/ Surrogate: Daughter Alistair Puente  --- [facility] document found in Alpha: NONE  --- HCP/ Living will/ Other advanced directives in Alpha: NONE  ------------------------------------------------------------------------  CARE PROVIDER DOCUMENTATION:  --- CLEO/MARIUSZ notes: Patient living at home with family.   --- Medicine notes reviewed  --- Surgery notes reviewed: "no acute intervention at this time"   --- Infectious Disease notes reviewed: "- Not able to tolerate PO vanco - Flagyl 500mg q 8 IV - Monitor stools if producing, monitor for signs megacolon"   --- Oncology notes reviewed : Patient with metastatic gallbladder adenocarcinoma "No chemotherapy while inpatient. On discharge, to follow-up with Dr. Kyel."     PLAN OF CARE  --- Known admissions in past year: 2  --- Current admit date: 1/3/2021  --- LOS: 2 days  --- LACE score: 12  --- Current dispo plan: TO BE DETERMINED  ------------------------------------------------------------------------  --- Chart reviewed: 30 Minutes [including time used to gather, review and transfer data to this note]    Prolonged services rendered, as part of this patient's care provided by Palliative Medicine, include: i.chart review for provider and ancillary service documentation, ii.pertinent diagnostics including laboratory and imaging studies,iii. medication review including PRN use, iv. admission history including previous palliative care encounters and GOC notes, v.advance care planning documents including HCP and MOLST forms in [place]. Part of Palliative Medicine extended evaluation and management also involves coordination of care with our IDT, the primary and consulting elizabeth, and unit CM/SW and Hospice if eligible. Recommendations based on the information gathered and discussed are outline in the AP of our notes.    ************************************************************************

## 2022-01-06 LAB
ANION GAP SERPL CALC-SCNC: 13 MMOL/L — SIGNIFICANT CHANGE UP (ref 7–14)
BUN SERPL-MCNC: 16 MG/DL — SIGNIFICANT CHANGE UP (ref 7–23)
CALCIUM SERPL-MCNC: 8.3 MG/DL — LOW (ref 8.4–10.5)
CHLORIDE SERPL-SCNC: 105 MMOL/L — SIGNIFICANT CHANGE UP (ref 98–107)
CO2 SERPL-SCNC: 19 MMOL/L — LOW (ref 22–31)
CREAT SERPL-MCNC: 0.55 MG/DL — SIGNIFICANT CHANGE UP (ref 0.5–1.3)
GLUCOSE BLDC GLUCOMTR-MCNC: 89 MG/DL — SIGNIFICANT CHANGE UP (ref 70–99)
GLUCOSE BLDC GLUCOMTR-MCNC: 96 MG/DL — SIGNIFICANT CHANGE UP (ref 70–99)
GLUCOSE BLDC GLUCOMTR-MCNC: 98 MG/DL — SIGNIFICANT CHANGE UP (ref 70–99)
GLUCOSE BLDC GLUCOMTR-MCNC: 99 MG/DL — SIGNIFICANT CHANGE UP (ref 70–99)
GLUCOSE SERPL-MCNC: 101 MG/DL — HIGH (ref 70–99)
MAGNESIUM SERPL-MCNC: 1.9 MG/DL — SIGNIFICANT CHANGE UP (ref 1.6–2.6)
PHOSPHATE SERPL-MCNC: 3.3 MG/DL — SIGNIFICANT CHANGE UP (ref 2.5–4.5)
POTASSIUM SERPL-MCNC: 4.2 MMOL/L — SIGNIFICANT CHANGE UP (ref 3.5–5.3)
POTASSIUM SERPL-SCNC: 4.2 MMOL/L — SIGNIFICANT CHANGE UP (ref 3.5–5.3)
SODIUM SERPL-SCNC: 137 MMOL/L — SIGNIFICANT CHANGE UP (ref 135–145)

## 2022-01-06 PROCEDURE — 99222 1ST HOSP IP/OBS MODERATE 55: CPT | Mod: GC

## 2022-01-06 PROCEDURE — 99232 SBSQ HOSP IP/OBS MODERATE 35: CPT

## 2022-01-06 RX ADMIN — PANTOPRAZOLE SODIUM 40 MILLIGRAM(S): 20 TABLET, DELAYED RELEASE ORAL at 13:21

## 2022-01-06 RX ADMIN — ENOXAPARIN SODIUM 40 MILLIGRAM(S): 100 INJECTION SUBCUTANEOUS at 13:21

## 2022-01-06 RX ADMIN — Medication 100 MILLIGRAM(S): at 18:05

## 2022-01-06 RX ADMIN — SODIUM CHLORIDE 75 MILLILITER(S): 9 INJECTION, SOLUTION INTRAVENOUS at 19:35

## 2022-01-06 RX ADMIN — Medication 100 MILLIGRAM(S): at 01:16

## 2022-01-06 RX ADMIN — SODIUM CHLORIDE 75 MILLILITER(S): 9 INJECTION, SOLUTION INTRAVENOUS at 02:29

## 2022-01-06 RX ADMIN — SODIUM CHLORIDE 75 MILLILITER(S): 9 INJECTION, SOLUTION INTRAVENOUS at 10:55

## 2022-01-06 RX ADMIN — Medication 100 MILLIGRAM(S): at 11:00

## 2022-01-06 NOTE — PROGRESS NOTE ADULT - ASSESSMENT
INES DOCKERY is a 61y Female who presents with a chief complaint of nausea.    Metastatic Gallbladder Adenocarcinoma  - Patient currently on active treatment gemcitabine + oxaliplatin. Last treatment December 13th.  - No chemotherapy while inpatient. On discharge, to follow-up with Dr. Kyle.    Clostridioides difficile Infection  - Patient tested positive, which can explain her diarrhea.  - ID consulted, on IV flagyl    Nausea with Cecal Mass  - CT imaging with large necrotic cecal tumor with possible small bowel obstruction.  - surgery on board, considering venting peg, to f/u on family decision  = no current intervention from them  - NG tube to suction      Dr Kyle spoke with pall care team and pt's daughter. Will follow.

## 2022-01-06 NOTE — PROGRESS NOTE ADULT - ASSESSMENT
61F w/ known metastatic ovarian and now newly diagnosed gallbladder CA s/p ALYSSA-BSO, appendectomy, omentectomy, and cholecystectomy who now presents as a transfer from [facility] after being found to have c.diff colitis along with high grade SBO presumed to be secondary to large cecal necrotic mass.    Recommendations:  - No acute surgical intervention at this time  - Not offering major surgery to bypass cecal obstruction as this is a futile option given patient's advanced disease and wouldn't prolong life/improve QOL  - Option for palliative G-tube pending GOC/follow-up with family   - Appreciate GOC eval  - Serial abdominal exams  - Monitor bowel function   - NPO/IVF/NGT  - Medical management of patient's C.diff     D/w Dr. Wilfrido VALENTINE Team Surgery  [id number] 61F w/ known metastatic ovarian and now newly diagnosed gallbladder CA s/p ALYSSA-BSO, appendectomy, omentectomy, and cholecystectomy who now presents as a transfer from [facility] after being found to have c.diff colitis along with high grade SBO presumed to be secondary to large cecal necrotic mass.    Recommendations:  - No acute surgical intervention at this time  - Not offering major surgery to bypass cecal obstruction as this is a futile option given patient's advanced disease and wouldn't prolong life/improve QOL  - Option for palliative G-tube pending GOC/follow-up with family   - If family amenable to G-tube, consult GI  - Appreciate GOC eval  - Monitor bowel function   - NPO/IVF/NGT  - Medical management of patient's C.diff   - Reconsult as clinical course warrants     D/w Dr. Wilfrido VALENTINE Team Surgery  [id number]

## 2022-01-06 NOTE — PROGRESS NOTE ADULT - SUBJECTIVE AND OBJECTIVE BOX
SARKIS [facility]  61y  Female      Patient is a 61y old  Female who presents with a chief complaint of Abdominal pain, cdiff, metastatic disease, SBO (06 Jan 2022 13:47)  comfortable,nad.Diarrhoea is improving,no fever.no abd.pain.Surgery eval.noted.    REVIEW OF SYSTEMS:  CONSTITUTIONAL: No fever  RESPIRATORY: No cough, hemoptysis or shortness of breath  CARDIOVASCULAR: No chest pain, palpitations, dizziness, or leg swelling  GASTROINTESTINAL: No abdominal pain.     INTERVAL HPI/OVERNIGHT EVENTS:  T(C): 36.3 (01-06-22 @ 07:00), Max: 36.7 (01-06-22 @ 01:00)  HR: 68 (01-06-22 @ 07:00) (68 - 89)  BP: 110/81 (01-06-22 @ 07:00) (110/81 - 124/73)  RR: 15 (01-06-22 @ 07:00) (15 - 16)  SpO2: 98% (01-06-22 @ 07:00) (98% - 98%)  Wt(kg): --  I&O's Summary    05 Jan 2022 07:01  -  06 Jan 2022 07:00  --------------------------------------------------------  IN: 525 mL / OUT: 150 mL / NET: 375 mL      T(C): 36.3 (01-06-22 @ 07:00), Max: 36.7 (01-06-22 @ 01:00)  HR: 68 (01-06-22 @ 07:00) (68 - 89)  BP: 110/81 (01-06-22 @ 07:00) (110/81 - 124/73)  RR: 15 (01-06-22 @ 07:00) (15 - 16)  SpO2: 98% (01-06-22 @ 07:00) (98% - 98%)  Wt(kg): --Vital Signs Last 24 Hrs  T(C): 36.3 (06 Jan 2022 07:00), Max: 36.7 (06 Jan 2022 01:00)  T(F): 97.4 (06 Jan 2022 07:00), Max: 98.1 (06 Jan 2022 01:00)  HR: 68 (06 Jan 2022 07:00) (68 - 89)  BP: 110/81 (06 Jan 2022 07:00) (110/81 - 124/73)  BP(mean): --  RR: 15 (06 Jan 2022 07:00) (15 - 16)  SpO2: 98% (06 Jan 2022 07:00) (98% - 98%)    LABS:    01-06    137  |  105  |  16  ----------------------------<  101<H>  4.2   |  19<L>  |  0.55    Ca    8.3<L>      06 Jan 2022 12:01  Phos  3.3     01-06  Mg     1.90     01-06          CAPILLARY BLOOD GLUCOSE      POCT Blood Glucose.: 96 mg/dL (06 Jan 2022 12:15)  POCT Blood Glucose.: 98 mg/dL (06 Jan 2022 05:55)  POCT Blood Glucose.: 89 mg/dL (06 Jan 2022 01:07)  POCT Blood Glucose.: 98 mg/dL (05 Jan 2022 22:30)  POCT Blood Glucose.: 99 mg/dL (05 Jan 2022 18:14)            PAST MEDICAL & SURGICAL HISTORY:  HTN (hypertension)    Hypothyroidism    Hyperlipidemia    Malignant neoplasm  left ovarian, s/p chemotherapy, 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 1000 milliLiter(s) (75 mL/Hr) IV Continuous <Continuous>  dextrose 5% + sodium chloride 0.9%. 1000 milliLiter(s) (100 mL/Hr) IV Continuous <Continuous>  enoxaparin Injectable 40 milliGRAM(s) SubCutaneous daily  influenza   Vaccine 0.5 milliLiter(s) IntraMuscular once  levothyroxine Injectable 37.5 MICROGram(s) IV Push at bedtime  metroNIDAZOLE  IVPB 500 milliGRAM(s) IV Intermittent every 8 hours  metroNIDAZOLE  IVPB      pantoprazole  Injectable 40 milliGRAM(s) IV Push daily    MEDICATIONS  (PRN):  morphine  - Injectable 2 milliGRAM(s) IV Push every 6 hours PRN Moderate Pain (4 - 6)to severe Pain  ondansetron Injectable 4 milliGRAM(s) IV Push every 8 hours PRN Nausea and/or Vomiting        RADIOLOGY & ADDITIONAL TESTS:    Imaging Personally Reviewed:  [ ] YES  [ ] NO    Consultant(s) Notes Reviewed:  [ ] YES  [ ] NO    PHYSICAL EXAM:  GENERAL: Alert and awake lying in bed in no distress.NGT in place  HEAD:  Atraumatic, Normocephalic  EYES: EOMI, BILLY, conjunctiva and sclera clear  NECK: Supple, No JVD, Normal thyroid  NERVOUS SYSTEM:  Alert & Oriented X3, Motor and sensory systems are intact,   CHEST/LUNG: Bilateral clear breath sounds, no rhochi, no wheezing, no crepitations,  HEART: Regular rate and rhythm; No murmurs, rubs, or gallops  ABDOMEN: Soft, Nontender,  mod. distended; Bowel sounds diminished  EXTREMITIES:   Peripheral Pulses are palpable, no  edema        Care Discussed with Consultants/Other Providers [x ] YES  [ ] NO      Code Status: [] Full Code [] DNR [] DNI [] Goals of Care:   Disposition: [] ICU [] Stroke Unit [] RCU []PCU []Floor [] Discharge Home         CHAY TatumFACP

## 2022-01-06 NOTE — PROGRESS NOTE ADULT - SUBJECTIVE AND OBJECTIVE BOX
24h Events:   - Overnight, no acute events    Subjective:   Patient examined at bedside this AM. Reports minimal abdominal discomfort, no nausea/vomiting, not passing gas, had few BMs yesterday.     Objective:  Vital Signs  T(C): 36.3 (01-06 @ 07:00), Max: 36.7 (01-06 @ 01:00)  HR: 68 (01-06 @ 07:00) (68 - 92)  BP: 110/81 (01-06 @ 07:00) (110/81 - 124/73)  RR: 15 (01-06 @ 07:00) (15 - 16)  SpO2: 98% (01-06 @ 07:00) (98% - 99%)  01-04-22 @ 07:01  -  01-05-22 @ 07:00  --------------------------------------------------------  IN:  Total IN: 0 mL    OUT:    Nasogastric/Oral tube (mL): 250 mL    Voided (mL): 0 mL  Total OUT: 250 mL    Total NET: -250 mL      01-05-22 @ 07:01  -  01-06-22 @ 06:58  --------------------------------------------------------  IN:  Total IN: 0 mL    OUT:    Nasogastric/Oral tube (mL): 150 mL  Total OUT: 150 mL    Total NET: -150 mL    Physical Exam:  General: alert and oriented, NAD  Resp: airway patent, respirations unlabored  CVS: regular rate and rhythm  Abdomen: softly distended, nontender, NGT with 150cc bilious contents  Extremities: no edema  Skin: warm, dry, appropriate color    Labs:                        10.6   12.61 )-----------( 282      ( 04 Jan 2022 07:43 )             34.5   01-04    133<L>  |  106  |  12  ----------------------------<  85  4.3   |  14<L>  |  0.61    Ca    8.0<L>      04 Jan 2022 07:43  Phos  2.9     01-04  Mg     QNS     01-04    TPro  5.3<L>  /  Alb  2.2<L>  /  TBili  0.2  /  DBili  <0.2  /  AST  17  /  ALT  20  /  AlkPhos  107  01-04    CAPILLARY BLOOD GLUCOSE      POCT Blood Glucose.: 98 mg/dL (06 Jan 2022 05:55)  POCT Blood Glucose.: 89 mg/dL (06 Jan 2022 01:07)  POCT Blood Glucose.: 98 mg/dL (05 Jan 2022 22:30)  POCT Blood Glucose.: 99 mg/dL (05 Jan 2022 18:14)  POCT Blood Glucose.: 97 mg/dL (05 Jan 2022 12:05)  POCT Blood Glucose.: 93 mg/dL (05 Jan 2022 08:43)      Microbiology:      Medications:   MEDICATIONS  (STANDING):  dextrose 5% + lactated ringers. 1000 milliLiter(s) (75 mL/Hr) IV Continuous <Continuous>  dextrose 5% + sodium chloride 0.9%. 1000 milliLiter(s) (100 mL/Hr) IV Continuous <Continuous>  enoxaparin Injectable 40 milliGRAM(s) SubCutaneous daily  influenza   Vaccine 0.5 milliLiter(s) IntraMuscular once  levothyroxine Injectable 37.5 MICROGram(s) IV Push at bedtime  metroNIDAZOLE  IVPB 500 milliGRAM(s) IV Intermittent every 8 hours  metroNIDAZOLE  IVPB      pantoprazole  Injectable 40 milliGRAM(s) IV Push daily    MEDICATIONS  (PRN):  morphine  - Injectable 2 milliGRAM(s) IV Push every 6 hours PRN Moderate Pain (4 - 6)to severe Pain  ondansetron Injectable 4 milliGRAM(s) IV Push every 8 hours PRN Nausea and/or Vomiting

## 2022-01-06 NOTE — CONSULT NOTE ADULT - ATTENDING COMMENTS
GI consulted for venting PEG.   History of ovarian cancer s/p TAHBSO, debulking. Recently diagnosed with metastatic gallbladder cancer.    Transferred to [facility] for possible surgical management of SBO in setting of cecal mass. Deemed poor surgical candidate.   Also with C diff infection. Only on IV flagyl as unable to tolerate PO vanc.   Most recent CBC with mild leukocytosis (1/4), but pending repeat labs. CT on 1/2 without clear window, but stomach is decompressed.   As surgical intervention is not being offered, endoscopic PEG placement for venting purposes can be attempted for palliative decompression.   Given patient’s comorbidities and nature of procedure, patient is at risk of significant complications, including but not limited to: infection, bleeding, poor wound healing, peritonitis, perforation, or potential damage to other organs. Additionally, endoscopic placement of PEG may not be feasible if adequate site is not identified during procedure. It will be discussed with patient/family that this PEG tube will not reverse the obstruction and patient still may not be able to tolerate PO intake, but may improve symptoms of nausea/vomiting/bloating. Patient will also likely need to be intubated for procedure for airway protection given bowel obstruction.   Discussed procedure and risks extensively with patient's daughter, Cindi. She has not yet spoken with her mother (ie patient) and is not yet sure if her mother would be agreeable to the procedure. Cindi was very distraught on phone and explained that she can only make a decision after discussing with her mother, but that she needs to come to hospital in person in order to have these discussions. Cindi is aware that current hospital policies are not allowing visitors; she would like to speak further with the primary team and nurse management about other options.   Pending further family discussions, will determine timing of PEG placement (if in line with GOC).

## 2022-01-06 NOTE — PROGRESS NOTE ADULT - SUBJECTIVE AND OBJECTIVE BOX
Subjective: Patient seen in bed - She reports having had one soft medium size bowel movement - She denies any abdominal pain - Otherwise minimal communication due to language barrier, but I was able to have a long discussion with her daughter.    Objective:   Vital Signs Last 24 Hrs  T(C): 36.3 (06 Jan 2022 07:00), Max: 36.7 (06 Jan 2022 01:00)  T(F): 97.4 (06 Jan 2022 07:00), Max: 98.1 (06 Jan 2022 01:00)  HR: 68 (06 Jan 2022 07:00) (68 - 89)  BP: 110/81 (06 Jan 2022 07:00) (110/81 - 124/73)  BP(mean): --  RR: 15 (06 Jan 2022 07:00) (15 - 16)  SpO2: 98% (06 Jan 2022 07:00) (98% - 98%)    Daily     Daily     Heent: N/C, AT PER no scleral icterus, No JVD  Pul: clear  Cor: RRR  Abdomen: softly distended, non tender, decreased BS.   Extremities: without edema, motor/sensory intact    I&O's Detail    05 Jan 2022 07:01  -  06 Jan 2022 07:00  --------------------------------------------------------  IN:    dextrose 5% + lactated ringers: 525 mL  Total IN: 525 mL    OUT:    Nasogastric/Oral tube (mL): 150 mL  Total OUT: 150 mL    Total NET: 375 mL          MEDICATIONS  (STANDING):  dextrose 5% + lactated ringers. 1000 milliLiter(s) (75 mL/Hr) IV Continuous <Continuous>  dextrose 5% + sodium chloride 0.9%. 1000 milliLiter(s) (100 mL/Hr) IV Continuous <Continuous>  enoxaparin Injectable 40 milliGRAM(s) SubCutaneous daily  influenza   Vaccine 0.5 milliLiter(s) IntraMuscular once  levothyroxine Injectable 37.5 MICROGram(s) IV Push at bedtime  metroNIDAZOLE  IVPB 500 milliGRAM(s) IV Intermittent every 8 hours  metroNIDAZOLE  IVPB      pantoprazole  Injectable 40 milliGRAM(s) IV Push daily    MEDICATIONS  (PRN):  morphine  - Injectable 2 milliGRAM(s) IV Push every 6 hours PRN Moderate Pain (4 - 6)to severe Pain  ondansetron Injectable 4 milliGRAM(s) IV Push every 8 hours PRN Nausea and/or Vomiting        01-06    137  |  105  |  16  ----------------------------<  101<H>  4.2   |  19<L>  |  0.55    Ca    8.3<L>      06 Jan 2022 12:01  Phos  3.3     01-06  Mg     1.90     01-06          Radiologic Studies:

## 2022-01-06 NOTE — PROGRESS NOTE ADULT - SUBJECTIVE AND OBJECTIVE BOX
CC: F/U for SBO    Saw/spoke to patient. No fevers, no chills. No new complaints.    Allergies  No Known Allergies    ANTIMICROBIALS:  metroNIDAZOLE  IVPB 500 every 8 hours  metroNIDAZOLE  IVPB      PE:    Vital Signs Last 24 Hrs  T(C): 36.4 (06 Jan 2022 13:08), Max: 36.7 (06 Jan 2022 01:00)  T(F): 97.6 (06 Jan 2022 13:08), Max: 98.1 (06 Jan 2022 01:00)  HR: 88 (06 Jan 2022 13:08) (68 - 89)  BP: 135/81 (06 Jan 2022 13:08) (110/81 - 135/81)  RR: 16 (06 Jan 2022 13:08) (15 - 16)  SpO2: 98% (06 Jan 2022 13:08) (98% - 98%)    Gen: AOx3, NAD, non-toxic  CV: S1+S2 normal, nontachycardic  Resp: Clear bilat, no resp distress, no crackles/wheezes  Abd: Soft, nontender, +BS  Ext: No LE edema, no wounds    LABS:    01-06    137  |  105  |  16  ----------------------------<  101<H>  4.2   |  19<L>  |  0.55    Ca    8.3<L>      06 Jan 2022 12:01  Phos  3.3     01-06  Mg     1.90     01-06    MICROBIOLOGY:    Clean Catch Clean Catch (Midstream)  12-28-21   <10,000 CFU/mL Normal Urogenital Liv     RADIOLOGY:    1/3    FINDINGS:    Partially imaged chest port tip terminates in the right atrium. The   enteric tube coils within the stomach with its tip in the fundus of the   stomach. Right upper quadrant cholecystectomy clips.    Multiple gaseous distention of the small bowel loops.    IMPRESSION:    The enteric tube tip in the stomach.

## 2022-01-06 NOTE — PROGRESS NOTE ADULT - SUBJECTIVE AND OBJECTIVE BOX
Pt seen, feeling well, no complaints. Had loose BM this am, NGT in    MEDICATIONS  (STANDING):  dextrose 5% + lactated ringers. 1000 milliLiter(s) (75 mL/Hr) IV Continuous <Continuous>  dextrose 5% + sodium chloride 0.9%. 1000 milliLiter(s) (100 mL/Hr) IV Continuous <Continuous>  enoxaparin Injectable 40 milliGRAM(s) SubCutaneous daily  influenza   Vaccine 0.5 milliLiter(s) IntraMuscular once  levothyroxine Injectable 37.5 MICROGram(s) IV Push at bedtime  metroNIDAZOLE  IVPB 500 milliGRAM(s) IV Intermittent every 8 hours  metroNIDAZOLE  IVPB      pantoprazole  Injectable 40 milliGRAM(s) IV Push daily    MEDICATIONS  (PRN):  morphine  - Injectable 2 milliGRAM(s) IV Push every 6 hours PRN Moderate Pain (4 - 6)to severe Pain  ondansetron Injectable 4 milliGRAM(s) IV Push every 8 hours PRN Nausea and/or Vomiting      ROS  min abd discomfort, as above, limited 2/2 participation    Vital Signs Last 24 Hrs  T(C): 36.4 (06 Jan 2022 13:08), Max: 36.7 (06 Jan 2022 01:00)  T(F): 97.6 (06 Jan 2022 13:08), Max: 98.1 (06 Jan 2022 01:00)  HR: 88 (06 Jan 2022 13:08) (68 - 89)  BP: 135/81 (06 Jan 2022 13:08) (110/81 - 135/81)  BP(mean): --  RR: 16 (06 Jan 2022 13:08) (15 - 16)  SpO2: 98% (06 Jan 2022 13:08) (98% - 98%)    PE  NAD  Awake, alert  Anicteric  limited 2/2 covid pandemic        01-06    137  |  105  |  16  ----------------------------<  101<H>  4.2   |  19<L>  |  0.55    Ca    8.3<L>      06 Jan 2022 12:01  Phos  3.3     01-06  Mg     1.90     01-06

## 2022-01-06 NOTE — PROGRESS NOTE ADULT - ASSESSMENT
62 yo F hypothyroid, ovarian CA, neoplasm, gallbladder CA, with abd pain, diarrhea  Leukocytosis, Afeb  CT with small bowel obstruction  C diff+  Not able to tolerate PO  Overall,  1) SBO  - NGT  - F/U surgery team  - Hold on standard abx unless signs bacterial infection--avoid exacerbation C diff  2) Leukocytosis  - Trend CBC  - Monitor for signs bacterial process  3) C diff  - Not able to tolerate PO vanco  - Flagyl 500mg q 8 IV  - Monitor stools if producing, monitor for signs megacolon    Chandan Andino MD  Pager [phone]  From 5pm-9am, and on weekends call [phone]

## 2022-01-06 NOTE — CONSULT NOTE ADULT - SUBJECTIVE AND OBJECTIVE BOX
Chief Complaint:  Patient is a 61y old  Female who presents with a chief complaint of Abdominal pain, cdiff, metastatic disease, SBO (06 Jan 2022 14:52)      HPI: 61F with PMHx of hypothyroid, HTN, HLD, Met. ovarian ca (liver, lung) s/p ALYSSA-BSO, appendectomy, cholecystectomy, and omentectomy on Jan 2021, newly diagnosed gallbladder CA on chemotherapy who now presented to [facility] for 6 days of abd pain, vomiting and diarrhea, during her course she was found to cdiff positive (started on po vanc) and found to have a high grade SBO 2/2 large necrotic cecal tumor with metastatic disease. She was transferred to [facility] for further management. Patient had persistent n/v but improved with NG tube. No further n/v. Diarrhea resolved. Had 1 soft BM today.    Allergies:  No Known Allergies      Home Medications:    Hospital Medications:  dextrose 5% + lactated ringers. 1000 milliLiter(s) IV Continuous <Continuous>  dextrose 5% + sodium chloride 0.9%. 1000 milliLiter(s) IV Continuous <Continuous>  enoxaparin Injectable 40 milliGRAM(s) SubCutaneous daily  influenza   Vaccine 0.5 milliLiter(s) IntraMuscular once  levothyroxine Injectable 37.5 MICROGram(s) IV Push at bedtime  metroNIDAZOLE  IVPB 500 milliGRAM(s) IV Intermittent every 8 hours  metroNIDAZOLE  IVPB      morphine  - Injectable 2 milliGRAM(s) IV Push every 6 hours PRN  ondansetron Injectable 4 milliGRAM(s) IV Push every 8 hours PRN  pantoprazole  Injectable 40 milliGRAM(s) IV Push daily      PMHX/PSHX:  HTN (hypertension)    Hypercholesterolemia    Hypothyroid    Hypothyroidism    Hyperlipidemia    Malignant neoplasm    No significant past surgical history    H/O breast surgery    S/P hysterectomy        Family history:  No pertinent family history in first degree relatives        Social History:     ROS: As per HPI, 14-point ROS negative otherwise.    General:  No wt loss, fevers, chills, night sweats, fatigue,   Eyes:  Good vision, no reported pain  ENT:  No sore throat, pain, runny nose, dysphagia  CV:  No pain, palpitations, hypo/hypertension  Resp:  No dyspnea, cough, tachypnea, wheezing  GI:  See HPI  :  No pain, bleeding, incontinence, nocturia  Muscle:  No pain, weakness  Neuro:  No weakness, tingling, memory problems  Psych:  No fatigue, insomnia, mood problems, depression  Endocrine:  No polyuria, polydipsia, cold/heat intolerance  Heme:  No petechiae, ecchymosis, easy bruisability  Skin:  No rash, edema      PHYSICAL EXAM:     Vital Signs:  Vital Signs Last 24 Hrs  T(C): 36.4 (06 Jan 2022 13:08), Max: 36.7 (06 Jan 2022 01:00)  T(F): 97.6 (06 Jan 2022 13:08), Max: 98.1 (06 Jan 2022 01:00)  HR: 88 (06 Jan 2022 13:08) (68 - 89)  BP: 135/81 (06 Jan 2022 13:08) (110/81 - 135/81)  BP(mean): --  RR: 16 (06 Jan 2022 13:08) (15 - 16)  SpO2: 98% (06 Jan 2022 13:08) (98% - 98%)  Daily     Daily     GENERAL:  no distress  HEENT:  NC/AT,  conjunctivae clear and pink, NGT noted  CHEST:  Full & symmetric excursion, no increased effort  HEART:  Regular rhythm, no JVD  ABDOMEN:  Soft, non-tender, non-distended, surgical scar noted  EXTREMITIES:  no cyanosis, clubbing or edema  SKIN:  No rash  NEURO:  Alert, oriented, nonfocal    LABS:    01-06    137  |  105  |  16  ----------------------------<  101<H>  4.2   |  19<L>  |  0.55    Ca    8.3<L>      06 Jan 2022 12:01  Phos  3.3     01-06  Mg     1.90     01-06                Imaging:  < from: CT Abdomen and Pelvis No Cont (01.02.22 @ 13:11) >  IMPRESSION: High-grade small bowel obstruction at the level of the cecum   with extensive local spread of malignancy and new mild intraperitoneal   free fluid.

## 2022-01-06 NOTE — PROGRESS NOTE ADULT - ASSESSMENT
Metastatic gallbladder carcinoma with extensive carcinomatosis with very poor outlook  This was discussed with the patient's daughter, who expressed the patient's and the family wish not to expose the patient to unnecessary risk if it is going to be for only a couple of months.  Again I discussed with them that a Gastrostomy tube would allow the patient to take in some liquid by mouth as well as draining the stomach when needed, without having to insert a Naso-gastric tube.  Palliative care consultation appreciated.

## 2022-01-06 NOTE — CONSULT NOTE ADULT - ASSESSMENT
Impression:  # SBO secondary to cecal mass  # Metastatic gallbladder cancer: moving towards comfort measures    Recommendation:    Reza Sales  [phone]  [phone]  Please call/page on call fellow on weekends and weekdays after 5pm   Impression:  # SBO secondary to cecal mass: NGT in place with decompression of stomach but small bowel remains dilated. Venting peg may improve n/v but perhaps may not permit po intake. Risks also include bleeding, infection, peritonitis, tube dislodgement. Also risks of anesthesia as general anesthesia required and small risk of mortality. D/w daughter at length who is frustrated she cannot see and speak with patient to discuss care plan. Daughter unsure if she and mother would be agreeable at this time after discussing risks/benefits.   # Metastatic gallbladder cancer: moving towards comfort measures  # Cdiff: improved on po vanco    Recommendation:  - please trend CBC, CMP, INR and fever curve  - family discussion appropriate as family and patient unsure  - would discuss with case management means of having daughter have adequate conversation with mother   - continue with po vanco for cdiff per primary team/ID  - supportive care    Reza Sales  [phone]  [phone]  Please call/page on call fellow on weekends and weekdays after 5pm   Impression:  # SBO secondary to cecal mass: NGT in place with decompression of stomach but small bowel remains dilated. Venting peg may improve n/v but perhaps may not permit po intake. Risks also include bleeding, infection, peritonitis, tube dislodgement. Also risks of anesthesia as general anesthesia required and small risk of mortality. D/w daughter at length who is frustrated she cannot see and speak with patient to discuss care plan. Daughter unsure if she and mother would be agreeable at this time after discussing risks/benefits.   # Metastatic gallbladder cancer: moving towards comfort measures  # Cdiff: improved on po vanco    Recommendation:  - please trend CBC, CMP, INR and fever curve  - family discussion appropriate as family and patient unsure  - would discuss with case management means of having daughter have adequate conversation with mother   - continue with IV flagyl po vanco for cdiff per primary team/ID  - supportive care  - pending labs and clinical course, if PEG in line with GOC, likely early next week    Reza Sales  [phone]  [phone]  Please call/page on call fellow on weekends and weekdays after 5pm

## 2022-01-07 LAB
ANION GAP SERPL CALC-SCNC: 9 MMOL/L — SIGNIFICANT CHANGE UP (ref 7–14)
BASOPHILS # BLD AUTO: 0.07 K/UL — SIGNIFICANT CHANGE UP (ref 0–0.2)
BASOPHILS NFR BLD AUTO: 0.4 % — SIGNIFICANT CHANGE UP (ref 0–2)
BUN SERPL-MCNC: 17 MG/DL — SIGNIFICANT CHANGE UP (ref 7–23)
CALCIUM SERPL-MCNC: 8.2 MG/DL — LOW (ref 8.4–10.5)
CHLORIDE SERPL-SCNC: 106 MMOL/L — SIGNIFICANT CHANGE UP (ref 98–107)
CO2 SERPL-SCNC: 26 MMOL/L — SIGNIFICANT CHANGE UP (ref 22–31)
CREAT SERPL-MCNC: 0.65 MG/DL — SIGNIFICANT CHANGE UP (ref 0.5–1.3)
EOSINOPHIL # BLD AUTO: 0.04 K/UL — SIGNIFICANT CHANGE UP (ref 0–0.5)
EOSINOPHIL NFR BLD AUTO: 0.2 % — SIGNIFICANT CHANGE UP (ref 0–6)
GLUCOSE BLDC GLUCOMTR-MCNC: 101 MG/DL — HIGH (ref 70–99)
GLUCOSE BLDC GLUCOMTR-MCNC: 105 MG/DL — HIGH (ref 70–99)
GLUCOSE BLDC GLUCOMTR-MCNC: 105 MG/DL — HIGH (ref 70–99)
GLUCOSE BLDC GLUCOMTR-MCNC: 118 MG/DL — HIGH (ref 70–99)
GLUCOSE SERPL-MCNC: 95 MG/DL — SIGNIFICANT CHANGE UP (ref 70–99)
HCT VFR BLD CALC: 36.8 % — SIGNIFICANT CHANGE UP (ref 34.5–45)
HGB BLD-MCNC: 11.4 G/DL — LOW (ref 11.5–15.5)
IANC: 13 K/UL — HIGH (ref 1.5–8.5)
IMM GRANULOCYTES NFR BLD AUTO: 0.8 % — SIGNIFICANT CHANGE UP (ref 0–1.5)
LYMPHOCYTES # BLD AUTO: 14 % — SIGNIFICANT CHANGE UP (ref 13–44)
LYMPHOCYTES # BLD AUTO: 2.3 K/UL — SIGNIFICANT CHANGE UP (ref 1–3.3)
MAGNESIUM SERPL-MCNC: 1.9 MG/DL — SIGNIFICANT CHANGE UP (ref 1.6–2.6)
MCHC RBC-ENTMCNC: 28.2 PG — SIGNIFICANT CHANGE UP (ref 27–34)
MCHC RBC-ENTMCNC: 31 GM/DL — LOW (ref 32–36)
MCV RBC AUTO: 91.1 FL — SIGNIFICANT CHANGE UP (ref 80–100)
MONOCYTES # BLD AUTO: 0.9 K/UL — SIGNIFICANT CHANGE UP (ref 0–0.9)
MONOCYTES NFR BLD AUTO: 5.5 % — SIGNIFICANT CHANGE UP (ref 2–14)
NEUTROPHILS # BLD AUTO: 13 K/UL — HIGH (ref 1.8–7.4)
NEUTROPHILS NFR BLD AUTO: 79.1 % — HIGH (ref 43–77)
NRBC # BLD: 0 /100 WBCS — SIGNIFICANT CHANGE UP
NRBC # FLD: 0 K/UL — SIGNIFICANT CHANGE UP
PHOSPHATE SERPL-MCNC: 2.9 MG/DL — SIGNIFICANT CHANGE UP (ref 2.5–4.5)
PLATELET # BLD AUTO: 264 K/UL — SIGNIFICANT CHANGE UP (ref 150–400)
POTASSIUM SERPL-MCNC: 3.8 MMOL/L — SIGNIFICANT CHANGE UP (ref 3.5–5.3)
POTASSIUM SERPL-SCNC: 3.8 MMOL/L — SIGNIFICANT CHANGE UP (ref 3.5–5.3)
RBC # BLD: 4.04 M/UL — SIGNIFICANT CHANGE UP (ref 3.8–5.2)
RBC # FLD: 17.5 % — HIGH (ref 10.3–14.5)
SODIUM SERPL-SCNC: 141 MMOL/L — SIGNIFICANT CHANGE UP (ref 135–145)
WBC # BLD: 16.44 K/UL — HIGH (ref 3.8–10.5)
WBC # FLD AUTO: 16.44 K/UL — HIGH (ref 3.8–10.5)

## 2022-01-07 PROCEDURE — 99233 SBSQ HOSP IP/OBS HIGH 50: CPT

## 2022-01-07 PROCEDURE — 99497 ADVNCD CARE PLAN 30 MIN: CPT | Mod: 25

## 2022-01-07 PROCEDURE — 99232 SBSQ HOSP IP/OBS MODERATE 35: CPT

## 2022-01-07 RX ADMIN — PANTOPRAZOLE SODIUM 40 MILLIGRAM(S): 20 TABLET, DELAYED RELEASE ORAL at 11:22

## 2022-01-07 RX ADMIN — ENOXAPARIN SODIUM 40 MILLIGRAM(S): 100 INJECTION SUBCUTANEOUS at 11:21

## 2022-01-07 RX ADMIN — Medication 100 MILLIGRAM(S): at 01:24

## 2022-01-07 RX ADMIN — Medication 37.5 MICROGRAM(S): at 01:24

## 2022-01-07 RX ADMIN — Medication 100 MILLIGRAM(S): at 11:21

## 2022-01-07 RX ADMIN — Medication 100 MILLIGRAM(S): at 17:46

## 2022-01-07 RX ADMIN — SODIUM CHLORIDE 75 MILLILITER(S): 9 INJECTION, SOLUTION INTRAVENOUS at 07:37

## 2022-01-07 RX ADMIN — SODIUM CHLORIDE 75 MILLILITER(S): 9 INJECTION, SOLUTION INTRAVENOUS at 21:40

## 2022-01-07 NOTE — PROGRESS NOTE ADULT - ASSESSMENT
60 yo F hypothyroid, ovarian CA, neoplasm, gallbladder CA, with abd pain, diarrhea  Leukocytosis, Afeb  CT with small bowel obstruction  C diff+  Not able to tolerate PO  Overall,  1) SBO  - NGT  - F/U surgery team  - Hold on standard abx unless signs bacterial infection--avoid exacerbation C diff  2) Leukocytosis  - Suspect reactive to SBO process  - Trend CBC  - Monitor for signs bacterial process  3) C diff  - Not able to tolerate PO vanco  - Flagyl 500mg q 8 IV, 14 days then DC  - Monitor stools if producing, monitor for signs megacolon    Chandan Andino MD  Pager [phone]  From 5pm-9am, and on weekends call [phone]

## 2022-01-07 NOTE — PROGRESS NOTE ADULT - PROBLEM SELECTOR PLAN 8
The patient's symptoms are well controlled. No acute symptoms at this time.      Thank you for allowing us to participate in your patient's care.  Patient to be discharged from [facility] team consult service today.   Discussed with primary team.  Please re-consult if we can be of further assistance, page [phone].

## 2022-01-07 NOTE — PROGRESS NOTE ADULT - CONVERSATION DETAILS
Daughter appreciative of discussion with surgery team yesterday and understand and agree that they would not want patient to undergo major surgery if it will not prolong her life expectancy by much. Daughter aware that a venting PEG can be considered; daughter with questions as she is unclear about role of venting PEG. Discussed in detail with daughter role of venting PEG to help patient with her symptoms of nausea/emesis due to obstruction and may allow her to tolerate PO intake if symptoms are better controlled. Daughter appreciative of clarification.     Daughter also states she had spoke to oncologist Dr. Kyle who had confirmed that if no surgical intervention, further DMT would not be beneficial. Extensively discussed role of hospice services as no further DMT is being recommended at this time. Daughter expressed she understands, and agrees to a Hospice Referral upon discharge when medically stable. Family still hoping for second oncologic opinion. Discussed if second oncologic opinion is able to offer DMT and family was interested, hospice would have to be revoked. Daughter asked for hospice referral in the interim upon discharge.     Addressed advanced directives. Daughter states they have discussed as a family before, and wanted patient to be FULL CODE. Discussed as no further DMT being recommended for metastatic gallbladder cancer at this time, family should consider rediscussing code status with patient. Daughter appreciative of bringing up these topics. She states they will further discuss with patient when she is home.     Emotional support provided to daughter

## 2022-01-07 NOTE — PROGRESS NOTE ADULT - ASSESSMENT
60 y/o woman with pmhx of hypothyroid, HTN, HLD, s/p ALYSSA-BSO, debulking of neoplasm, and metastatic gallbladder ca presents as a transfer from OSH for further management of her cdiff as well as potential surgical management of her metastatic disease and SBO  Palliative Care consulted for complex decision making  related to goals of care discussions

## 2022-01-07 NOTE — PROGRESS NOTE ADULT - SUBJECTIVE AND OBJECTIVE BOX
INES DOCKERY  61y  Female      Patient is a 61y old  Female who presents with a chief complaint of Abdominal pain, cdiff, metastatic disease, SBO (07 Jan 2022 12:54)  feels ok.no abd.pain,DiARRHOEA IS IMPROVING.Family and patient agrees for venting PEG    REVIEW OF SYSTEMS:  CONSTITUTIONAL: No fever  RESPIRATORY: No cough, hemoptysis or shortness of breath  CARDIOVASCULAR: No chest pain, palpitations, dizziness, or leg swelling  GASTROINTESTINAL: No abdominal pain. nausea, vomiting, hematemesis      INTERVAL HPI/OVERNIGHT EVENTS:  T(C): 36.6 (01-07-22 @ 15:06), Max: 36.6 (01-07-22 @ 07:00)  HR: 65 (01-07-22 @ 15:06) (60 - 65)  BP: 117/68 (01-07-22 @ 15:06) (117/68 - 141/79)  RR: 18 (01-07-22 @ 15:06) (16 - 18)  SpO2: 100% (01-07-22 @ 15:06) (100% - 100%)  Wt(kg): --  I&O's Summary    06 Jan 2022 07:01  -  07 Jan 2022 07:00  --------------------------------------------------------  IN: 825 mL / OUT: 550 mL / NET: 275 mL    07 Jan 2022 07:01  -  07 Jan 2022 21:27  --------------------------------------------------------  IN: 0 mL / OUT: 350 mL / NET: -350 mL      T(C): 36.6 (01-07-22 @ 15:06), Max: 36.6 (01-07-22 @ 07:00)  HR: 65 (01-07-22 @ 15:06) (60 - 65)  BP: 117/68 (01-07-22 @ 15:06) (117/68 - 141/79)  RR: 18 (01-07-22 @ 15:06) (16 - 18)  SpO2: 100% (01-07-22 @ 15:06) (100% - 100%)  Wt(kg): --Vital Signs Last 24 Hrs  T(C): 36.6 (07 Jan 2022 15:06), Max: 36.6 (07 Jan 2022 07:00)  T(F): 97.9 (07 Jan 2022 15:06), Max: 97.9 (07 Jan 2022 07:00)  HR: 65 (07 Jan 2022 15:06) (60 - 65)  BP: 117/68 (07 Jan 2022 15:06) (117/68 - 141/79)  BP(mean): --  RR: 18 (07 Jan 2022 15:06) (16 - 18)  SpO2: 100% (07 Jan 2022 15:06) (100% - 100%)    LABS:                        11.4   16.44 )-----------( 264      ( 07 Jan 2022 09:21 )             36.8     01-07    141  |  106  |  17  ----------------------------<  95  3.8   |  26  |  0.65    Ca    8.2<L>      07 Jan 2022 09:21  Phos  2.9     01-07  Mg     1.90     01-07          CAPILLARY BLOOD GLUCOSE      POCT Blood Glucose.: 101 mg/dL (07 Jan 2022 17:34)  POCT Blood Glucose.: 118 mg/dL (07 Jan 2022 12:21)  POCT Blood Glucose.: 105 mg/dL (07 Jan 2022 08:08)  POCT Blood Glucose.: 105 mg/dL (07 Jan 2022 02:03)            PAST MEDICAL & SURGICAL HISTORY:  HTN (hypertension)    Hypothyroidism    Hyperlipidemia    Malignant neoplasm  left ovarian, s/p chemotherapy, gallbladder ca on chemo    H/O breast surgery  I&amp;D left mastitis 29y/o    S/P hysterectomy        MEDICATIONS  (STANDING):  dextrose 5% + lactated ringers. 1000 milliLiter(s) (75 mL/Hr) IV Continuous <Continuous>  dextrose 5% + sodium chloride 0.9%. 1000 milliLiter(s) (100 mL/Hr) IV Continuous <Continuous>  enoxaparin Injectable 40 milliGRAM(s) SubCutaneous daily  influenza   Vaccine 0.5 milliLiter(s) IntraMuscular once  levothyroxine Injectable 37.5 MICROGram(s) IV Push at bedtime  metroNIDAZOLE  IVPB 500 milliGRAM(s) IV Intermittent every 8 hours  metroNIDAZOLE  IVPB      pantoprazole  Injectable 40 milliGRAM(s) IV Push daily    MEDICATIONS  (PRN):  morphine  - Injectable 2 milliGRAM(s) IV Push every 6 hours PRN Moderate Pain (4 - 6)to severe Pain  ondansetron Injectable 4 milliGRAM(s) IV Push every 8 hours PRN Nausea and/or Vomiting        RADIOLOGY & ADDITIONAL TESTS:    Imaging Personally Reviewed:  [ ] YES  [ ] NO    Consultant(s) Notes Reviewed:  [X ] YES  [ ] NO    PHYSICAL EXAM:  GENERAL: Alert and awake lying in bed in no distress  HEAD:  Atraumatic, Normocephalic  EYES: EOMI, BILLY, conjunctiva and sclera clear  NECK: Supple, No JVD, Normal thyroid  NERVOUS SYSTEM:  Alert & Oriented X3, Motor and sensory systems are intact,   CHEST/LUNG: Bilateral clear breath sounds, no rhochi, no wheezing, no crepitations,  HEART: Regular rate and rhythm; No murmurs, rubs, or gallops  ABDOMEN: Soft, Nontender, Nondistended; Bowel sounds present  EXTREMITIES:   Peripheral Pulses are palpable, no  edema        Care Discussed with Consultants/Other Providers [ ] YES  [ ] NO      Code Status: [] Full Code [] DNR [] DNI [] Goals of Care:   Disposition: [] ICU [] Stroke Unit [] RCU []PCU []Floor [] Discharge Home         TEE TatumP

## 2022-01-07 NOTE — PROGRESS NOTE ADULT - SUBJECTIVE AND OBJECTIVE BOX
SUBJECTIVE AND OBJECTIVE:  Patient seen and examined at bedside. Patient being followed up for goals of care.  Patient seen and examined at bedside. Called Quentin : [phone]. Patient called her daughter Alistair Bautista ([phone]) on phone as she wanted her daughter to translate for her instead.   Patient denied any acute complaints including pain, nausea/emesis. Does endorse NG tube is uncomfortable.     INTERVAL HPI/OVERNIGHT EVENTS:  Surgery input appreciated. Plans for possible venting PEG if family in agreement.     Allergies    No Known Allergies    Intolerances    MEDICATIONS  (STANDING):  dextrose 5% + lactated ringers. 1000 milliLiter(s) (75 mL/Hr) IV Continuous <Continuous>  dextrose 5% + sodium chloride 0.9%. 1000 milliLiter(s) (100 mL/Hr) IV Continuous <Continuous>  enoxaparin Injectable 40 milliGRAM(s) SubCutaneous daily  influenza   Vaccine 0.5 milliLiter(s) IntraMuscular once  levothyroxine Injectable 37.5 MICROGram(s) IV Push at bedtime  metroNIDAZOLE  IVPB 500 milliGRAM(s) IV Intermittent every 8 hours  metroNIDAZOLE  IVPB      pantoprazole  Injectable 40 milliGRAM(s) IV Push daily    MEDICATIONS  (PRN):  morphine  - Injectable 2 milliGRAM(s) IV Push every 6 hours PRN Moderate Pain (4 - 6)to severe Pain  ondansetron Injectable 4 milliGRAM(s) IV Push every 8 hours PRN Nausea and/or Vomiting      ITEMS UNCHECKED ARE NOT PRESENT    PRESENT SYMPTOMS: [ ]Unable to obtain due to poor mentation   Source if other than patient:  [ ]Family   [ ]Team     Pain:  [ ]yes [ x]no  QOL impact -   Location -                    Aggravating factors -  Quality -  Radiation -  Timing-  Severity (0-10 scale):  Minimal acceptable level (0-10 scale):     Dyspnea:                           [ ]Mild [ ]Moderate [ ]Severe  Anxiety:                             [ ]Mild [ ]Moderate [ ]Severe  Agitation:                          [ ]Mild [ ]Moderate [ ]Severe  Fatigue:                             [ ]Mild [ ]Moderate [ ]Severe  Nausea:                             [ ]Mild [ ]Moderate [ ]Severe  Loss of appetite:              [ ]Mild [ ]Moderate [ ]Severe  Constipation:                   [ ]Mild [ ]Moderate [ ]Severe  Diarrhea:                          [ ]Mild [ ]Moderate [ ]Severe    CPOT:    https://www.[email or web address]/getattachment/[email or web address]/Critical-Care-Pain-Observation-Tool-(CPOT)    PAIN AD Score:	  http://geriatrictoolkit.[email or web address]/painad.pdf (Ctrl + left click to view)    Other Symptoms:  [x ]All other review of systems negative     Palliative Performance Status Version 2:      60   %      http://[email or web address].org/files/news/palliative_performance_scale_ppsv2.pdf    PHYSICAL EXAM:  Vital Signs Last 24 Hrs  T(C): 36.6 (07 Jan 2022 07:00), Max: 36.6 (07 Jan 2022 07:00)  T(F): 97.9 (07 Jan 2022 07:00), Max: 97.9 (07 Jan 2022 07:00)  HR: 63 (07 Jan 2022 07:00) (60 - 92)  BP: 129/67 (07 Jan 2022 07:00) (129/67 - 141/79)  BP(mean): --  RR: 16 (07 Jan 2022 07:00) (16 - 18)  SpO2: 100% (07 Jan 2022 07:00) (98% - 100%)     I&O's Summary    06 Jan 2022 07:01  -  07 Jan 2022 07:00  --------------------------------------------------------  IN: 825 mL / OUT: 550 mL / NET: 275 mL       GENERAL:  [ x]Alert  [x ]Oriented x 3  [ ]Lethargic  [ ]Cachexia  [ ]Unarousable  [ x]Verbal [ ]Non-Verbal  [ x] No Distress  Behavioral:   [ ] Anxiety  [ ] Delirium [ ] Agitation [ x] Calm  [ ] Other  HEENT:  [ ]Normal  [ ] Temporal Wasting  [ x]Dry mouth   [ ]ET Tube/Trach  [ ]Oral lesions  [ ] Mucositis  PULMONARY:   [ ]Clear [ ]Tachypnea  [ ]Audible excessive secretions   [ ]Rhonchi        [ ]Right [ ]Left [ ]Bilateral  [ ]Crackles        [ ]Right [ ]Left [ ]Bilateral  [ ]Wheezing     [ ]Right [ ]Left [ ]Bilateral  [x ]Diminished breath sounds [ ]right [ ]left [ x]bilateral  CARDIOVASCULAR:    [ x]Regular [ ]Irregular [ ]Tachy  [ ]Keyur [ ]Murmur [ ]Other  GASTROINTESTINAL:  [ x]Soft  [ ]Distended   [ ]+BS  [x ]Non tender [ ]Tender  [ ]PEG [x ] NGT  Last BM: 1/6  GENITOURINARY:  [ x]Normal [ ] Incontinent   [ ]Oliguria/Anuria   [ ]Mason  MUSCULOSKELETAL:   [ ]Normal   [ x]Weakness  [ ]Bed/Wheelchair bound [ ]Edema  [  ] amputation  [  ] contraction  NEUROLOGIC:   [ x]No focal deficits  [ ]Cognitive impairment  [ ]Dysphagia [ ]Dysarthria [ ]Paresis [ ]Other   SKIN: See Nursing Skin Assessment for further details  [ x]Normal    [ ]Rash  [ ]Pressure ulcer(s)       Present on admission [ ]y [ ]n   [  ]  Wound    [  ] hyperpigmentation    CRITICAL CARE:  [ ]Shock Present  [ ]Septic [ ]Cardiogenic [ ]Neurologic [ ]Hypovolemic  [ ]Vasopressors [ ]Inotropes  [ ]Respiratory failure present [ ]Mechanical Ventilation [ ]Non-invasive ventilatory support [ ]High-Flow   [ ]Acute  [ ]Chronic [ ]Hypoxic  [ ]Hypercarbic [ ]Other  [ ]Other organ failure     LABS:                        11.4   16.44 )-----------( 264      ( 07 Jan 2022 09:21 )             36.8   01-07    141  |  106  |  17  ----------------------------<  95  3.8   |  26  |  0.65    Ca    8.2<L>      07 Jan 2022 09:21  Phos  2.9     01-07  Mg     1.90     01-07        CAPILLARY BLOOD GLUCOSE      POCT Blood Glucose.: 118 mg/dL (07 Jan 2022 12:21)  POCT Blood Glucose.: 105 mg/dL (07 Jan 2022 08:08)  POCT Blood Glucose.: 105 mg/dL (07 Jan 2022 02:03)  POCT Blood Glucose.: 99 mg/dL (06 Jan 2022 19:38)    RADIOLOGY & ADDITIONAL STUDIES: reviewed     Protein Calorie Malnutrition Present: [ ]mild [ ]moderate [ ]severe [ ]underweight [ ]morbid obesity  https://www.andeal.org/vault/2356/web/files/ONC/Table_Clinical%20Characteristics%20to%20Document%20Malnutrition-White%20JV%20et%20al%202012.pdf    Height (cm): 154.9 (01-03-22 @ 11:21), 154.9 (12-26-21 @ 08:02), 154.9 (10-27-21 @ 10:54)  Weight (kg): 73 (01-05-22 @ 02:51), 65.8 (12-26-21 @ 08:02), 66.7 (10-27-21 @ 10:54)  BMI (kg/m2): 30.4 (01-05-22 @ 02:51), 27.4 (01-03-22 @ 11:21), 27.4 (12-26-21 @ 08:02)    [ ]PPSV2 < or = 30%  [ ]significant weight loss [ ]poor nutritional intake [ ]anasarca   [ ]Artificial Nutrition    REFERRALS:   [ ]Chaplaincy  [x ]Hospice  [ ]Child Life  [ ]Social Work  [ x]Case management [ ]Holistic Therapy

## 2022-01-07 NOTE — PROGRESS NOTE ADULT - SUBJECTIVE AND OBJECTIVE BOX
CC: F/U for C diff    Saw/spoke to patient. No fevers, no chills. No new complaints.    Allergies  No Known Allergies    ANTIMICROBIALS:  metroNIDAZOLE  IVPB 500 every 8 hours  metroNIDAZOLE  IVPB      PE:    Vital Signs Last 24 Hrs  T(C): 36.6 (07 Jan 2022 15:06), Max: 36.6 (07 Jan 2022 07:00)  T(F): 97.9 (07 Jan 2022 15:06), Max: 97.9 (07 Jan 2022 07:00)  HR: 65 (07 Jan 2022 15:06) (60 - 92)  BP: 117/68 (07 Jan 2022 15:06) (117/68 - 141/79)  RR: 18 (07 Jan 2022 15:06) (16 - 18)  SpO2: 100% (07 Jan 2022 15:06) (100% - 100%)    Gen: AOx3, NAD, non-toxic, pleasant  CV: S1+S2 normal, nontachycardic  Resp: Clear bilat, no resp distress, no crackles/wheezes  Abd: Soft, nontender, +BS, NGT  Ext: No LE edema, no wounds    LABS:                        11.4   16.44 )-----------( 264      ( 07 Jan 2022 09:21 )             36.8     01-07    141  |  106  |  17  ----------------------------<  95  3.8   |  26  |  0.65    Ca    8.2<L>      07 Jan 2022 09:21  Phos  2.9     01-07  Mg     1.90     01-07    MICROBIOLOGY:    Clean Catch Clean Catch (Midstream)  12-28-21   <10,000 CFU/mL Normal Urogenital Liv    RADIOLOGY:    1/3    FINDINGS:    Partially imaged chest port tip terminates in the right atrium. The   enteric tube coils within the stomach with its tip in the fundus of the   stomach. Right upper quadrant cholecystectomy clips.    Multiple gaseous distention of the small bowel loops.    IMPRESSION:    The enteric tube tip in the stomach.

## 2022-01-08 LAB
ANION GAP SERPL CALC-SCNC: 9 MMOL/L — SIGNIFICANT CHANGE UP (ref 7–14)
BASOPHILS # BLD AUTO: 0.05 K/UL — SIGNIFICANT CHANGE UP (ref 0–0.2)
BASOPHILS NFR BLD AUTO: 0.3 % — SIGNIFICANT CHANGE UP (ref 0–2)
BUN SERPL-MCNC: 19 MG/DL — SIGNIFICANT CHANGE UP (ref 7–23)
CALCIUM SERPL-MCNC: 8.1 MG/DL — LOW (ref 8.4–10.5)
CHLORIDE SERPL-SCNC: 109 MMOL/L — HIGH (ref 98–107)
CO2 SERPL-SCNC: 24 MMOL/L — SIGNIFICANT CHANGE UP (ref 22–31)
CREAT SERPL-MCNC: 0.67 MG/DL — SIGNIFICANT CHANGE UP (ref 0.5–1.3)
EOSINOPHIL # BLD AUTO: 0.04 K/UL — SIGNIFICANT CHANGE UP (ref 0–0.5)
EOSINOPHIL NFR BLD AUTO: 0.3 % — SIGNIFICANT CHANGE UP (ref 0–6)
GLUCOSE SERPL-MCNC: 104 MG/DL — HIGH (ref 70–99)
HCT VFR BLD CALC: 35.8 % — SIGNIFICANT CHANGE UP (ref 34.5–45)
HGB BLD-MCNC: 11.2 G/DL — LOW (ref 11.5–15.5)
IANC: 12.26 K/UL — HIGH (ref 1.5–8.5)
IMM GRANULOCYTES NFR BLD AUTO: 0.8 % — SIGNIFICANT CHANGE UP (ref 0–1.5)
LYMPHOCYTES # BLD AUTO: 13.6 % — SIGNIFICANT CHANGE UP (ref 13–44)
LYMPHOCYTES # BLD AUTO: 2.08 K/UL — SIGNIFICANT CHANGE UP (ref 1–3.3)
MAGNESIUM SERPL-MCNC: 2.1 MG/DL — SIGNIFICANT CHANGE UP (ref 1.6–2.6)
MCHC RBC-ENTMCNC: 27.8 PG — SIGNIFICANT CHANGE UP (ref 27–34)
MCHC RBC-ENTMCNC: 31.3 GM/DL — LOW (ref 32–36)
MCV RBC AUTO: 88.8 FL — SIGNIFICANT CHANGE UP (ref 80–100)
MONOCYTES # BLD AUTO: 0.73 K/UL — SIGNIFICANT CHANGE UP (ref 0–0.9)
MONOCYTES NFR BLD AUTO: 4.8 % — SIGNIFICANT CHANGE UP (ref 2–14)
NEUTROPHILS # BLD AUTO: 12.26 K/UL — HIGH (ref 1.8–7.4)
NEUTROPHILS NFR BLD AUTO: 80.2 % — HIGH (ref 43–77)
NRBC # BLD: 0 /100 WBCS — SIGNIFICANT CHANGE UP
NRBC # FLD: 0 K/UL — SIGNIFICANT CHANGE UP
PHOSPHATE SERPL-MCNC: 3 MG/DL — SIGNIFICANT CHANGE UP (ref 2.5–4.5)
PLATELET # BLD AUTO: 231 K/UL — SIGNIFICANT CHANGE UP (ref 150–400)
POTASSIUM SERPL-MCNC: 3.8 MMOL/L — SIGNIFICANT CHANGE UP (ref 3.5–5.3)
POTASSIUM SERPL-SCNC: 3.8 MMOL/L — SIGNIFICANT CHANGE UP (ref 3.5–5.3)
RBC # BLD: 4.03 M/UL — SIGNIFICANT CHANGE UP (ref 3.8–5.2)
RBC # FLD: 17.8 % — HIGH (ref 10.3–14.5)
SODIUM SERPL-SCNC: 142 MMOL/L — SIGNIFICANT CHANGE UP (ref 135–145)
WBC # BLD: 15.28 K/UL — HIGH (ref 3.8–10.5)
WBC # FLD AUTO: 15.28 K/UL — HIGH (ref 3.8–10.5)

## 2022-01-08 RX ADMIN — Medication 37.5 MICROGRAM(S): at 22:14

## 2022-01-08 RX ADMIN — PANTOPRAZOLE SODIUM 40 MILLIGRAM(S): 20 TABLET, DELAYED RELEASE ORAL at 12:54

## 2022-01-08 RX ADMIN — Medication 100 MILLIGRAM(S): at 18:29

## 2022-01-08 RX ADMIN — Medication 100 MILLIGRAM(S): at 10:04

## 2022-01-08 RX ADMIN — ENOXAPARIN SODIUM 40 MILLIGRAM(S): 100 INJECTION SUBCUTANEOUS at 12:53

## 2022-01-08 RX ADMIN — Medication 37.5 MICROGRAM(S): at 00:36

## 2022-01-08 RX ADMIN — Medication 100 MILLIGRAM(S): at 02:13

## 2022-01-08 NOTE — PROGRESS NOTE ADULT - SUBJECTIVE AND OBJECTIVE BOX
INES DOCKERY  61y  Female      Patient is a 61y old  Female who presents with a chief complaint of Abdominal pain, cdiff, metastatic disease, SBO (07 Jan 2022 19:26)  comfortable,nad,,Diarrhoea better,mild abd.discomfort  no fever,no cough    REVIEW OF SYSTEMS:  as above  INTERVAL HPI/OVERNIGHT EVENTS:  T(C): 36.6 (01-08-22 @ 09:00), Max: 36.6 (01-08-22 @ 09:00)  HR: 64 (01-08-22 @ 09:00) (64 - 65)  BP: 118/70 (01-08-22 @ 09:00) (114/72 - 123/70)  RR: 18 (01-08-22 @ 09:00) (18 - 18)  SpO2: 100% (01-08-22 @ 09:00) (100% - 100%)  Wt(kg): --  I&O's Summary    07 Jan 2022 07:01  -  08 Jan 2022 07:00  --------------------------------------------------------  IN: 0 mL / OUT: 600 mL / NET: -600 mL    08 Jan 2022 07:01  -  08 Jan 2022 18:56  --------------------------------------------------------  IN: 0 mL / OUT: 1250 mL / NET: -1250 mL      T(C): 36.6 (01-08-22 @ 09:00), Max: 36.6 (01-08-22 @ 09:00)  HR: 64 (01-08-22 @ 09:00) (64 - 65)  BP: 118/70 (01-08-22 @ 09:00) (114/72 - 123/70)  RR: 18 (01-08-22 @ 09:00) (18 - 18)  SpO2: 100% (01-08-22 @ 09:00) (100% - 100%)  Wt(kg): --Vital Signs Last 24 Hrs  T(C): 36.6 (08 Jan 2022 09:00), Max: 36.6 (08 Jan 2022 09:00)  T(F): 97.9 (08 Jan 2022 09:00), Max: 97.9 (08 Jan 2022 09:00)  HR: 64 (08 Jan 2022 09:00) (64 - 65)  BP: 118/70 (08 Jan 2022 09:00) (114/72 - 123/70)  BP(mean): --  RR: 18 (08 Jan 2022 09:00) (18 - 18)  SpO2: 100% (08 Jan 2022 09:00) (100% - 100%)    LABS:                        11.2   15.28 )-----------( 231      ( 08 Jan 2022 07:50 )             35.8     01-08    142  |  109<H>  |  19  ----------------------------<  104<H>  3.8   |  24  |  0.67    Ca    8.1<L>      08 Jan 2022 07:50  Phos  3.0     01-08  Mg     2.10     01-08          CAPILLARY BLOOD GLUCOSE                PAST MEDICAL & SURGICAL HISTORY:  HTN (hypertension)    Hypothyroidism    Hyperlipidemia    Malignant neoplasm  left ovarian, s/p chemotherapy, 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 1000 milliLiter(s) (75 mL/Hr) IV Continuous <Continuous>  dextrose 5% + sodium chloride 0.9%. 1000 milliLiter(s) (100 mL/Hr) IV Continuous <Continuous>  enoxaparin Injectable 40 milliGRAM(s) SubCutaneous daily  influenza   Vaccine 0.5 milliLiter(s) IntraMuscular once  levothyroxine Injectable 37.5 MICROGram(s) IV Push at bedtime  metroNIDAZOLE  IVPB 500 milliGRAM(s) IV Intermittent every 8 hours  metroNIDAZOLE  IVPB      pantoprazole  Injectable 40 milliGRAM(s) IV Push daily    MEDICATIONS  (PRN):  morphine  - Injectable 2 milliGRAM(s) IV Push every 6 hours PRN Moderate Pain (4 - 6)to severe Pain  ondansetron Injectable 4 milliGRAM(s) IV Push every 8 hours PRN Nausea and/or Vomiting        RADIOLOGY & ADDITIONAL TESTS:    Imaging Personally Reviewed:  [ ] YES  [ ] NO    Consultant(s) Notes Reviewed:  [ x] YES  [ ] NO    PHYSICAL EXAM:  GENERAL: Alert and awake lying in bed in no distress  HEAD:  Atraumatic, Normocephalic  NGT in place  EYES: EOMI, BILLY, conjunctiva and sclera clear  NECK: Supple, No JVD, Normal thyroid  NERVOUS SYSTEM:  Alert & Oriented X3, Motor and sensory systems are intact,   CHEST/LUNG: Bilateral clear breath sounds, no rhochi, no wheezing, no crepitations,  HEART: Regular rate and rhythm; No murmurs, rubs, or gallops  ABDOMEN: Soft, Nontender,  mod. distended; Decreased bowel sounds  EXTREMITIES:   Peripheral Pulses are palpable, no  edema        Care Discussed with Consultants/Other Providers [ x] YES  [ ] NO      Code Status: [] Full Code [] DNR [] DNI [] Goals of Care:   Disposition: [] ICU [] Stroke Unit [] RCU []PCU []Floor [] Discharge Home         CHAY TatumFACP

## 2022-01-09 LAB
ANION GAP SERPL CALC-SCNC: 7 MMOL/L — SIGNIFICANT CHANGE UP (ref 7–14)
BASOPHILS # BLD AUTO: 0.05 K/UL — SIGNIFICANT CHANGE UP (ref 0–0.2)
BASOPHILS NFR BLD AUTO: 0.3 % — SIGNIFICANT CHANGE UP (ref 0–2)
BUN SERPL-MCNC: 20 MG/DL — SIGNIFICANT CHANGE UP (ref 7–23)
CALCIUM SERPL-MCNC: 8.4 MG/DL — SIGNIFICANT CHANGE UP (ref 8.4–10.5)
CHLORIDE SERPL-SCNC: 107 MMOL/L — SIGNIFICANT CHANGE UP (ref 98–107)
CO2 SERPL-SCNC: 28 MMOL/L — SIGNIFICANT CHANGE UP (ref 22–31)
CREAT SERPL-MCNC: 0.62 MG/DL — SIGNIFICANT CHANGE UP (ref 0.5–1.3)
EOSINOPHIL # BLD AUTO: 0.03 K/UL — SIGNIFICANT CHANGE UP (ref 0–0.5)
EOSINOPHIL NFR BLD AUTO: 0.2 % — SIGNIFICANT CHANGE UP (ref 0–6)
GLUCOSE SERPL-MCNC: 120 MG/DL — HIGH (ref 70–99)
HCT VFR BLD CALC: 38.6 % — SIGNIFICANT CHANGE UP (ref 34.5–45)
HGB BLD-MCNC: 12.1 G/DL — SIGNIFICANT CHANGE UP (ref 11.5–15.5)
IANC: 12.93 K/UL — HIGH (ref 1.5–8.5)
IMM GRANULOCYTES NFR BLD AUTO: 0.6 % — SIGNIFICANT CHANGE UP (ref 0–1.5)
LYMPHOCYTES # BLD AUTO: 12.6 % — LOW (ref 13–44)
LYMPHOCYTES # BLD AUTO: 2 K/UL — SIGNIFICANT CHANGE UP (ref 1–3.3)
MAGNESIUM SERPL-MCNC: 2 MG/DL — SIGNIFICANT CHANGE UP (ref 1.6–2.6)
MCHC RBC-ENTMCNC: 28 PG — SIGNIFICANT CHANGE UP (ref 27–34)
MCHC RBC-ENTMCNC: 31.3 GM/DL — LOW (ref 32–36)
MCV RBC AUTO: 89.4 FL — SIGNIFICANT CHANGE UP (ref 80–100)
MONOCYTES # BLD AUTO: 0.76 K/UL — SIGNIFICANT CHANGE UP (ref 0–0.9)
MONOCYTES NFR BLD AUTO: 4.8 % — SIGNIFICANT CHANGE UP (ref 2–14)
NEUTROPHILS # BLD AUTO: 12.93 K/UL — HIGH (ref 1.8–7.4)
NEUTROPHILS NFR BLD AUTO: 81.5 % — HIGH (ref 43–77)
NRBC # BLD: 0 /100 WBCS — SIGNIFICANT CHANGE UP
NRBC # FLD: 0 K/UL — SIGNIFICANT CHANGE UP
PHOSPHATE SERPL-MCNC: 3.1 MG/DL — SIGNIFICANT CHANGE UP (ref 2.5–4.5)
PLATELET # BLD AUTO: 232 K/UL — SIGNIFICANT CHANGE UP (ref 150–400)
POTASSIUM SERPL-MCNC: 3.4 MMOL/L — LOW (ref 3.5–5.3)
POTASSIUM SERPL-SCNC: 3.4 MMOL/L — LOW (ref 3.5–5.3)
RBC # BLD: 4.32 M/UL — SIGNIFICANT CHANGE UP (ref 3.8–5.2)
RBC # FLD: 18.1 % — HIGH (ref 10.3–14.5)
SARS-COV-2 RNA SPEC QL NAA+PROBE: SIGNIFICANT CHANGE UP
SODIUM SERPL-SCNC: 142 MMOL/L — SIGNIFICANT CHANGE UP (ref 135–145)
WBC # BLD: 15.87 K/UL — HIGH (ref 3.8–10.5)
WBC # FLD AUTO: 15.87 K/UL — HIGH (ref 3.8–10.5)

## 2022-01-09 PROCEDURE — 99232 SBSQ HOSP IP/OBS MODERATE 35: CPT | Mod: GC

## 2022-01-09 RX ORDER — POTASSIUM CHLORIDE 20 MEQ
10 PACKET (EA) ORAL
Refills: 0 | Status: COMPLETED | OUTPATIENT
Start: 2022-01-09 | End: 2022-01-09

## 2022-01-09 RX ORDER — CHLORHEXIDINE GLUCONATE 213 G/1000ML
1 SOLUTION TOPICAL DAILY
Refills: 0 | Status: DISCONTINUED | OUTPATIENT
Start: 2022-01-09 | End: 2022-01-15

## 2022-01-09 RX ADMIN — Medication 100 MILLIEQUIVALENT(S): at 12:07

## 2022-01-09 RX ADMIN — SODIUM CHLORIDE 75 MILLILITER(S): 9 INJECTION, SOLUTION INTRAVENOUS at 22:37

## 2022-01-09 RX ADMIN — Medication 300 UNIT(S): at 17:52

## 2022-01-09 RX ADMIN — Medication 100 MILLIGRAM(S): at 17:57

## 2022-01-09 RX ADMIN — Medication 100 MILLIEQUIVALENT(S): at 11:06

## 2022-01-09 RX ADMIN — Medication 100 MILLIGRAM(S): at 10:03

## 2022-01-09 RX ADMIN — CHLORHEXIDINE GLUCONATE 1 APPLICATION(S): 213 SOLUTION TOPICAL at 17:57

## 2022-01-09 RX ADMIN — Medication 100 MILLIGRAM(S): at 03:28

## 2022-01-09 RX ADMIN — Medication 37.5 MICROGRAM(S): at 22:37

## 2022-01-09 RX ADMIN — PANTOPRAZOLE SODIUM 40 MILLIGRAM(S): 20 TABLET, DELAYED RELEASE ORAL at 13:05

## 2022-01-09 RX ADMIN — Medication 100 MILLIEQUIVALENT(S): at 13:05

## 2022-01-09 RX ADMIN — ENOXAPARIN SODIUM 40 MILLIGRAM(S): 100 INJECTION SUBCUTANEOUS at 13:06

## 2022-01-09 NOTE — PROGRESS NOTE ADULT - SUBJECTIVE AND OBJECTIVE BOX
Patient seen and examined at bedside. pt had one episode fo diarrhea today     MEDICATIONS  (STANDING):  chlorhexidine 2% Cloths 1 Application(s) Topical daily  dextrose 5% + lactated ringers. 1000 milliLiter(s) (75 mL/Hr) IV Continuous <Continuous>  dextrose 5% + sodium chloride 0.9%. 1000 milliLiter(s) (100 mL/Hr) IV Continuous <Continuous>  enoxaparin Injectable 40 milliGRAM(s) SubCutaneous daily  heparin  Lock Flush 100 Units/mL Injectable 300 Unit(s) IV Push once  influenza   Vaccine 0.5 milliLiter(s) IntraMuscular once  levothyroxine Injectable 37.5 MICROGram(s) IV Push at bedtime  metroNIDAZOLE  IVPB 500 milliGRAM(s) IV Intermittent every 8 hours  metroNIDAZOLE  IVPB      pantoprazole  Injectable 40 milliGRAM(s) IV Push daily  potassium chloride  10 mEq/100 mL IVPB 10 milliEquivalent(s) IV Intermittent every 1 hour    MEDICATIONS  (PRN):  morphine  - Injectable 2 milliGRAM(s) IV Push every 6 hours PRN Moderate Pain (4 - 6)to severe Pain  ondansetron Injectable 4 milliGRAM(s) IV Push every 8 hours PRN Nausea and/or Vomiting        Vital Signs Last 24 Hrs  T(C): 36.6 (09 Jan 2022 03:00), Max: 36.6 (08 Jan 2022 15:00)  T(F): 97.8 (09 Jan 2022 03:00), Max: 97.9 (08 Jan 2022 15:00)  HR: 79 (09 Jan 2022 03:00) (68 - 99)  BP: 115/74 (09 Jan 2022 03:00) (115/74 - 129/87)  BP(mean): --  RR: 16 (09 Jan 2022 03:00) (16 - 18)  SpO2: 95% (09 Jan 2022 03:00) (95% - 99%)      PHYSICAL EXAM:     GENERAL:  Appears stated age, well-groomed  ABDOMEN:  Soft, non-tender,   EXTEREMITIES:  no cyanosis,clubbing or edema    NEURO:  Alert, oriented, no asterixis                            12.1   15.87 )-----------( 232      ( 09 Jan 2022 07:41 )             38.6       01-09    142  |  107  |  20  ----------------------------<  120<H>  3.4<L>   |  28  |  0.62    Ca    8.4      09 Jan 2022 07:41  Phos  3.1     01-09  Mg     2.00     01-09

## 2022-01-09 NOTE — PROGRESS NOTE ADULT - ASSESSMENT
# SBO secondary to cecal mass: NGT in place with decompression of stomach but small bowel remains dilated. Venting peg may improve n/v but perhaps may not permit po intake. Risks also include bleeding, infection, peritonitis, tube dislodgement. Also risks of anesthesia as general anesthesia required and small risk of mortality. D/w daughter at length who is frustrated she cannot see and speak with patient to discuss care plan. Daughter unsure if she and mother would be agreeable at this time after discussing risks/benefits.   # Metastatic gallbladder cancer: moving towards comfort measures  # Cdiff: improved on po vanco    Recommendation:  - Will tentatively plan for PEG tomorrow pending confirmation with family  - Please repeat COVID PCR today  - NPO  - please trend CBC, CMP, INR and fever curve  - continue with IV flagyl for cdiff per primary team/ID  - supportive care    Thank you for involving us in the care of this patient. Please reach out if any further questions.    Akhil Means, PGY-5  GI Fellow    Available on [employer] Teams  Pager [phone] ([facility]) or [phone] ([facility])  After 5PM/Weekends, please contact the on-call GI fellow: [phone]  Available through [employer] Teams

## 2022-01-09 NOTE — PROGRESS NOTE ADULT - ASSESSMENT
INES DOCKERY is a 61y Female who presents with a chief complaint of nausea.    Metastatic Gallbladder Adenocarcinoma  - Patient currently on active treatment gemcitabine + oxaliplatin. Last treatment December 13th.  - No chemotherapy while inpatient. On discharge, to follow-up with Dr. Kyle.  - on going discussion with [facility] care   - dicussed with nurse about family visiting.     Clostridioides difficile Infection  - Patient tested positive, which can explain her diarrhea.  - ID consulted, on IV flagyl    Nausea with Cecal Mass  - CT imaging with large necrotic cecal tumor with possible small bowel obstruction.  - surgery on board, considering venting peg, to f/u on family decision  = no current intervention from them  - NG tube to suction      Reza Diaz MD  Hematology Oncology   O: [phone]  C: [phone]

## 2022-01-09 NOTE — PROGRESS NOTE ADULT - SUBJECTIVE AND OBJECTIVE BOX
INES DOCKERY  61y  Female      Patient is a 61y old  Female who presents with a chief complaint of Abdominal pain, cdiff, metastatic disease, SBO (09 Jan 2022 10:56)  comfortable,nad.Diarrhoea is better.no fever,no sob,no cp    REVIEW OF SYSTEMS:  CONSTITUTIONAL: No fever  RESPIRATORY: No cough, hemoptysis or shortness of breath  CARDIOVASCULAR: No chest pain, palpitations, dizziness, or leg swelling  GASTROINTESTINAL: No abdominal pain. nausea, vomiting, hematemesis  GENITOURINARY: No dysuria, frequency, hematuria   NEUROLOGICAL: No headaches, no dizziness  MUSCULOSKELETAL: No joint pain or swelling;     INTERVAL HPI/OVERNIGHT EVENTS:  T(C): 36.6 (01-09-22 @ 15:00), Max: 36.6 (01-09-22 @ 03:00)  HR: 79 (01-09-22 @ 15:00) (79 - 82)  BP: 131/89 (01-09-22 @ 15:00) (115/74 - 131/89)  RR: 18 (01-09-22 @ 15:00) (16 - 18)  SpO2: 99% (01-09-22 @ 15:00) (95% - 99%)  Wt(kg): --  I&O's Summary    08 Jan 2022 07:01  -  09 Jan 2022 07:00  --------------------------------------------------------  IN: 0 mL / OUT: 2450 mL / NET: -2450 mL    09 Jan 2022 07:01  -  09 Jan 2022 21:08  --------------------------------------------------------  IN: 0 mL / OUT: 1920 mL / NET: -1920 mL      T(C): 36.6 (01-09-22 @ 15:00), Max: 36.6 (01-09-22 @ 03:00)  HR: 79 (01-09-22 @ 15:00) (79 - 82)  BP: 131/89 (01-09-22 @ 15:00) (115/74 - 131/89)  RR: 18 (01-09-22 @ 15:00) (16 - 18)  SpO2: 99% (01-09-22 @ 15:00) (95% - 99%)  Wt(kg): --Vital Signs Last 24 Hrs  T(C): 36.6 (09 Jan 2022 15:00), Max: 36.6 (09 Jan 2022 03:00)  T(F): 97.8 (09 Jan 2022 15:00), Max: 97.8 (09 Jan 2022 03:00)  HR: 79 (09 Jan 2022 15:00) (79 - 82)  BP: 131/89 (09 Jan 2022 15:00) (115/74 - 131/89)  BP(mean): --  RR: 18 (09 Jan 2022 15:00) (16 - 18)  SpO2: 99% (09 Jan 2022 15:00) (95% - 99%)    LABS:                        12.1   15.87 )-----------( 232      ( 09 Jan 2022 07:41 )             38.6     01-09    142  |  107  |  20  ----------------------------<  120<H>  3.4<L>   |  28  |  0.62    Ca    8.4      09 Jan 2022 07:41  Phos  3.1     01-09  Mg     2.00     01-09          CAPILLARY BLOOD GLUCOSE                PAST MEDICAL & SURGICAL HISTORY:  HTN (hypertension)    Hypothyroidism    Hyperlipidemia    Malignant neoplasm  left ovarian, s/p chemotherapy, 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 1000 milliLiter(s) (75 mL/Hr) IV Continuous <Continuous>  dextrose 5% + sodium chloride 0.9%. 1000 milliLiter(s) (100 mL/Hr) IV Continuous <Continuous>  enoxaparin Injectable 40 milliGRAM(s) SubCutaneous daily  influenza   Vaccine 0.5 milliLiter(s) IntraMuscular once  levothyroxine Injectable 37.5 MICROGram(s) IV Push at bedtime  metroNIDAZOLE  IVPB 500 milliGRAM(s) IV Intermittent every 8 hours  metroNIDAZOLE  IVPB      pantoprazole  Injectable 40 milliGRAM(s) IV Push daily    MEDICATIONS  (PRN):  morphine  - Injectable 2 milliGRAM(s) IV Push every 6 hours PRN Moderate Pain (4 - 6)to severe Pain  ondansetron Injectable 4 milliGRAM(s) IV Push every 8 hours PRN Nausea and/or Vomiting        RADIOLOGY & ADDITIONAL TESTS:    Imaging Personally Reviewed:  [ ] YES  [ ] NO    Consultant(s) Notes Reviewed:  [ ] YES  [ ] NO    PHYSICAL EXAM:  GENERAL: Alert and awake lying in bed in no distress  HEAD:  Atraumatic, Normocephalic  NGT in place  EYES: EOMI, BILLY, conjunctiva and sclera clear  NECK: Supple, No JVD, Normal thyroid  NERVOUS SYSTEM:  Alert & Oriented X3, Motor and sensory systems are intact,   CHEST/LUNG: Bilateral clear breath sounds, no rhochi, no wheezing, no crepitations,  HEART: Regular rate and rhythm; No murmurs, rubs, or gallops  ABDOMEN: Soft, Nontender,  mod. distended; Bowel sounds present,decreased bowel sounds  EXTREMITIES:   Peripheral Pulses are palpable, no  edema        Care Discussed with Consultants/Other Providers [ ] YES  [ ] NO      Code Status: [] Full Code [] DNR [] DNI [] Goals of Care:   Disposition: [] ICU [] Stroke Unit [] RCU []PCU []Floor [] Discharge Home         [facility]St[facility] Hospital[facility]

## 2022-01-09 NOTE — PROGRESS NOTE ADULT - SUBJECTIVE AND OBJECTIVE BOX
Interval Events:   - No acute events    Allergies:  No Known Allergies        Hospital Medications:  chlorhexidine 2% Cloths 1 Application(s) Topical daily  dextrose 5% + lactated ringers. 1000 milliLiter(s) IV Continuous <Continuous>  dextrose 5% + sodium chloride 0.9%. 1000 milliLiter(s) IV Continuous <Continuous>  enoxaparin Injectable 40 milliGRAM(s) SubCutaneous daily  heparin  Lock Flush 100 Units/mL Injectable 300 Unit(s) IV Push once  influenza   Vaccine 0.5 milliLiter(s) IntraMuscular once  levothyroxine Injectable 37.5 MICROGram(s) IV Push at bedtime  metroNIDAZOLE  IVPB 500 milliGRAM(s) IV Intermittent every 8 hours  metroNIDAZOLE  IVPB      morphine  - Injectable 2 milliGRAM(s) IV Push every 6 hours PRN  ondansetron Injectable 4 milliGRAM(s) IV Push every 8 hours PRN  pantoprazole  Injectable 40 milliGRAM(s) IV Push daily  potassium chloride  10 mEq/100 mL IVPB 10 milliEquivalent(s) IV Intermittent every 1 hour      PMHX/PSHX:  HTN (hypertension)    Hypercholesterolemia    Hypothyroid    Hypothyroidism    Hyperlipidemia    Malignant neoplasm    No significant past surgical history    H/O breast surgery    S/P hysterectomy        Family history:  No pertinent family history in first degree relatives        ROS:   General:  No wt loss, fevers, chills, night sweats, fatigue,   Eyes:  Good vision, no reported pain  ENT:  No sore throat, pain, runny nose, dysphagia  CV:  No pain, palpitations, hypo/hypertension  Pulm:  No dyspnea, cough, tachypnea, wheezing  GI:  As per HPI  :  No pain, bleeding, incontinence, nocturia  Muscle:  No pain, weakness  Neuro:  No weakness, tingling, memory problems  Psych:  No fatigue, insomnia, mood problems, depression  Endocrine:  No polyuria, polydipsia, cold/heat intolerance  Heme:  No petechiae, ecchymosis, easy bruisability  Skin:  No rash, tattoos, scars, edema      PHYSICAL EXAM:   Vital Signs:  Vital Signs Last 24 Hrs  T(C): 36.6 (09 Jan 2022 09:00), Max: 36.6 (08 Jan 2022 15:00)  T(F): 97.8 (09 Jan 2022 09:00), Max: 97.9 (08 Jan 2022 15:00)  HR: 82 (09 Jan 2022 09:00) (68 - 99)  BP: 118/74 (09 Jan 2022 09:00) (115/74 - 129/87)  BP(mean): --  RR: 18 (09 Jan 2022 09:00) (16 - 18)  SpO2: 99% (09 Jan 2022 09:00) (95% - 99%)  Daily     Daily       01-08-22 @ 07:01  -  01-09-22 @ 07:00  --------------------------------------------------------  IN: 0 mL / OUT: 2450 mL / NET: -2450 mL    01-09-22 @ 07:01  -  01-09-22 @ 10:57  --------------------------------------------------------  IN: 0 mL / OUT: 500 mL / NET: -500 mL        GENERAL:  no distress  HEENT:  NC/AT,  conjunctivae clear and pink, NGT noted  CHEST:  Full & symmetric excursion, no increased effort  HEART:  Regular rhythm, no JVD  ABDOMEN:  Soft, non-tender, non-distended, surgical scar noted  EXTREMITIES:  no cyanosis, clubbing or edema  SKIN:  No rash  NEURO:  Alert, oriented, nonfocal    LABS:                        12.1   15.87 )-----------( 232      ( 09 Jan 2022 07:41 )             38.6     Mean Cell Volume: 89.4 fL (01-09-22 @ 07:41)    01-09    142  |  107  |  20  ----------------------------<  120<H>  3.4<L>   |  28  |  0.62    Ca    8.4      09 Jan 2022 07:41  Phos  3.1     01-09  Mg     2.00     01-09                                    12.1   15.87 )-----------( 232      ( 09 Jan 2022 07:41 )             38.6                         11.2   15.28 )-----------( 231      ( 08 Jan 2022 07:50 )             35.8                         11.4   16.44 )-----------( 264      ( 07 Jan 2022 09:21 )             36.8       Imaging:

## 2022-01-10 LAB
ANION GAP SERPL CALC-SCNC: 10 MMOL/L — SIGNIFICANT CHANGE UP (ref 7–14)
APTT BLD: 21.2 SEC — LOW (ref 27–36.3)
BASOPHILS # BLD AUTO: 0.05 K/UL — SIGNIFICANT CHANGE UP (ref 0–0.2)
BASOPHILS NFR BLD AUTO: 0.3 % — SIGNIFICANT CHANGE UP (ref 0–2)
BLD GP AB SCN SERPL QL: NEGATIVE — SIGNIFICANT CHANGE UP
BUN SERPL-MCNC: 19 MG/DL — SIGNIFICANT CHANGE UP (ref 7–23)
CALCIUM SERPL-MCNC: 8.2 MG/DL — LOW (ref 8.4–10.5)
CHLORIDE SERPL-SCNC: 107 MMOL/L — SIGNIFICANT CHANGE UP (ref 98–107)
CO2 SERPL-SCNC: 23 MMOL/L — SIGNIFICANT CHANGE UP (ref 22–31)
CREAT SERPL-MCNC: 0.65 MG/DL — SIGNIFICANT CHANGE UP (ref 0.5–1.3)
EOSINOPHIL # BLD AUTO: 0.04 K/UL — SIGNIFICANT CHANGE UP (ref 0–0.5)
EOSINOPHIL NFR BLD AUTO: 0.3 % — SIGNIFICANT CHANGE UP (ref 0–6)
GLUCOSE SERPL-MCNC: 112 MG/DL — HIGH (ref 70–99)
HCT VFR BLD CALC: 39.2 % — SIGNIFICANT CHANGE UP (ref 34.5–45)
HGB BLD-MCNC: 11.9 G/DL — SIGNIFICANT CHANGE UP (ref 11.5–15.5)
IANC: 12.9 K/UL — HIGH (ref 1.5–8.5)
IMM GRANULOCYTES NFR BLD AUTO: 0.9 % — SIGNIFICANT CHANGE UP (ref 0–1.5)
INR BLD: 1.1 RATIO — SIGNIFICANT CHANGE UP (ref 0.88–1.16)
LYMPHOCYTES # BLD AUTO: 1.9 K/UL — SIGNIFICANT CHANGE UP (ref 1–3.3)
LYMPHOCYTES # BLD AUTO: 12 % — LOW (ref 13–44)
MAGNESIUM SERPL-MCNC: 2 MG/DL — SIGNIFICANT CHANGE UP (ref 1.6–2.6)
MCHC RBC-ENTMCNC: 27.6 PG — SIGNIFICANT CHANGE UP (ref 27–34)
MCHC RBC-ENTMCNC: 30.4 GM/DL — LOW (ref 32–36)
MCV RBC AUTO: 91 FL — SIGNIFICANT CHANGE UP (ref 80–100)
MONOCYTES # BLD AUTO: 0.79 K/UL — SIGNIFICANT CHANGE UP (ref 0–0.9)
MONOCYTES NFR BLD AUTO: 5 % — SIGNIFICANT CHANGE UP (ref 2–14)
NEUTROPHILS # BLD AUTO: 12.9 K/UL — HIGH (ref 1.8–7.4)
NEUTROPHILS NFR BLD AUTO: 81.5 % — HIGH (ref 43–77)
NRBC # BLD: 0 /100 WBCS — SIGNIFICANT CHANGE UP
NRBC # FLD: 0 K/UL — SIGNIFICANT CHANGE UP
PHOSPHATE SERPL-MCNC: 2.9 MG/DL — SIGNIFICANT CHANGE UP (ref 2.5–4.5)
PLATELET # BLD AUTO: 198 K/UL — SIGNIFICANT CHANGE UP (ref 150–400)
POTASSIUM SERPL-MCNC: 3.9 MMOL/L — SIGNIFICANT CHANGE UP (ref 3.5–5.3)
POTASSIUM SERPL-SCNC: 3.9 MMOL/L — SIGNIFICANT CHANGE UP (ref 3.5–5.3)
PROTHROM AB SERPL-ACNC: 12.6 SEC — SIGNIFICANT CHANGE UP (ref 10.6–13.6)
RBC # BLD: 4.31 M/UL — SIGNIFICANT CHANGE UP (ref 3.8–5.2)
RBC # FLD: 18.5 % — HIGH (ref 10.3–14.5)
RH IG SCN BLD-IMP: POSITIVE — SIGNIFICANT CHANGE UP
SARS-COV-2 RNA SPEC QL NAA+PROBE: SIGNIFICANT CHANGE UP
SODIUM SERPL-SCNC: 140 MMOL/L — SIGNIFICANT CHANGE UP (ref 135–145)
WBC # BLD: 15.82 K/UL — HIGH (ref 3.8–10.5)
WBC # FLD AUTO: 15.82 K/UL — HIGH (ref 3.8–10.5)

## 2022-01-10 PROCEDURE — 99232 SBSQ HOSP IP/OBS MODERATE 35: CPT | Mod: GC

## 2022-01-10 PROCEDURE — 99232 SBSQ HOSP IP/OBS MODERATE 35: CPT

## 2022-01-10 RX ADMIN — Medication 100 MILLIGRAM(S): at 11:17

## 2022-01-10 RX ADMIN — PANTOPRAZOLE SODIUM 40 MILLIGRAM(S): 20 TABLET, DELAYED RELEASE ORAL at 11:18

## 2022-01-10 RX ADMIN — Medication 100 MILLIGRAM(S): at 19:11

## 2022-01-10 RX ADMIN — SODIUM CHLORIDE 75 MILLILITER(S): 9 INJECTION, SOLUTION INTRAVENOUS at 19:11

## 2022-01-10 RX ADMIN — Medication 100 MILLIGRAM(S): at 01:43

## 2022-01-10 RX ADMIN — CHLORHEXIDINE GLUCONATE 1 APPLICATION(S): 213 SOLUTION TOPICAL at 11:18

## 2022-01-10 RX ADMIN — Medication 37.5 MICROGRAM(S): at 22:15

## 2022-01-10 RX ADMIN — ENOXAPARIN SODIUM 40 MILLIGRAM(S): 100 INJECTION SUBCUTANEOUS at 11:18

## 2022-01-10 NOTE — DIETITIAN INITIAL EVALUATION ADULT. - PROBLEM SELECTOR PLAN 1
pt w/ high grade SBO at level of cecum due to large cecal mass. Seen by surgery at OSH, transferred here for potential intervention  -likely cause of white count to 13, pt afebrile and vitals stable  -still tolerating po vanc, will continue given cdiff positive, otherwise keep NPO  -surg onc consulted by ER, will f/u recs, continue ivf and pain control  -Pt able to complete all ADLs with no dyspnea nor chest pain and RCRI of 0-1 depending on surgical procedure. Will check EKG first and if no issues, no further cardiac workup needed before surgery (if deemed necessary by surgery)

## 2022-01-10 NOTE — PROGRESS NOTE ADULT - SUBJECTIVE AND OBJECTIVE BOX
CC: F/U for C diff    Saw/spoke to patient. No fevers, no chills. No new complaints.    Allergies  No Known Allergies    ANTIMICROBIALS:  metroNIDAZOLE  IVPB 500 every 8 hours  metroNIDAZOLE  IVPB      PE:    Vital Signs Last 24 Hrs  T(C): 36.6 (10 Cristian 2022 10:51), Max: 36.6 (10 Cristian 2022 03:00)  T(F): 97.8 (10 Cristian 2022 10:51), Max: 97.8 (10 Cristian 2022 03:00)  HR: 50 (10 Cristian 2022 10:51) (50 - 90)  BP: 135/93 (10 Cristian 2022 10:51) (118/80 - 135/93)  RR: 17 (10 Cristian 2022 10:51) (17 - 18)  SpO2: 96% (10 Cristian 2022 10:51) (96% - 100%)    Gen: AOx3, NAD, non-toxic, pleasant  CV: S1+S2 normal, nontachycardic  Resp: Clear bilat, no resp distress, no crackles/wheezes  Abd: Soft, nontender, +BS, NGT  Ext: No LE edema, no wounds    LABS:                        11.9   15.82 )-----------( 198      ( 10 Cristian 2022 07:36 )             39.2     01-10    140  |  107  |  19  ----------------------------<  112<H>  3.9   |  23  |  0.65    Ca    8.2<L>      10 Cristian 2022 07:36  Phos  2.9     01-10  Mg     2.00     01-10    MICROBIOLOGY:    Clean Catch Clean Catch (Midstream)  12-28-21   <10,000 CFU/mL Normal Urogenital Liv    RADIOLOGY:    1/3 XR:    FINDINGS:    Partially imaged chest port tip terminates in the right atrium. The   enteric tube coils within the stomach with its tip in the fundus of the   stomach. Right upper quadrant cholecystectomy clips.    Multiple gaseous distention of the small bowel loops.    IMPRESSION:    The enteric tube tip in the stomach.

## 2022-01-10 NOTE — DIETITIAN INITIAL EVALUATION ADULT. - PERTINENT LABORATORY DATA
01-10 Na140 mmol/L Glu 112 mg/dL<H> K+ 3.9 mmol/L Cr  0.65 mg/dL BUN 19 mg/dL 01-10 Phos 2.9 mg/dL 01-04 Alb 2.2 g/dL<L> 01-04 Chol 102 mg/dL LDL --    HDL 38 mg/dL<L> Trig 142 mg/dL

## 2022-01-10 NOTE — PROGRESS NOTE ADULT - SUBJECTIVE AND OBJECTIVE BOX
INES DOCKERY  61y  Female      Patient is a 61y old  Female who presents with a chief complaint of Abdominal pain, cdiff, metastatic disease, SBO (10 Cristian 2022 12:52)  comfortable,resting,nad GI f/u noted    REVIEW OF SYSTEMS:  no sob,no cp,no fever,no cough  INTERVAL HPI/OVERNIGHT EVENTS:  T(C): 36.6 (01-10-22 @ 10:51), Max: 36.6 (01-10-22 @ 03:00)  HR: 92 (01-10-22 @ 18:46) (50 - 92)  BP: 112/78 (01-10-22 @ 18:46) (112/78 - 135/93)  RR: 16 (01-10-22 @ 18:46) (16 - 18)  SpO2: 93% (01-10-22 @ 18:46) (93% - 100%)  Wt(kg): --  I&O's Summary    09 Jan 2022 07:01  -  10 Cristian 2022 07:00  --------------------------------------------------------  IN: 0 mL / OUT: 2020 mL / NET: -2020 mL    10 Cristian 2022 07:01  -  10 Cristian 2022 20:14  --------------------------------------------------------  IN: 700 mL / OUT: 1000 mL / NET: -300 mL      T(C): 36.6 (01-10-22 @ 10:51), Max: 36.6 (01-10-22 @ 03:00)  HR: 92 (01-10-22 @ 18:46) (50 - 92)  BP: 112/78 (01-10-22 @ 18:46) (112/78 - 135/93)  RR: 16 (01-10-22 @ 18:46) (16 - 18)  SpO2: 93% (01-10-22 @ 18:46) (93% - 100%)  Wt(kg): --Vital Signs Last 24 Hrs  T(C): 36.6 (10 Cristian 2022 10:51), Max: 36.6 (10 Cristian 2022 03:00)  T(F): 97.8 (10 Cristian 2022 10:51), Max: 97.8 (10 Cristian 2022 03:00)  HR: 92 (10 Cristian 2022 18:46) (50 - 92)  BP: 112/78 (10 Cristian 2022 18:46) (112/78 - 135/93)  BP(mean): --  RR: 16 (10 Cristian 2022 18:46) (16 - 18)  SpO2: 93% (10 Cristian 2022 18:46) (93% - 100%)    LABS:                        11.9   15.82 )-----------( 198      ( 10 Cristian 2022 07:36 )             39.2     01-10    140  |  107  |  19  ----------------------------<  112<H>  3.9   |  23  |  0.65    Ca    8.2<L>      10 Cristian 2022 07:36  Phos  2.9     01-10  Mg     2.00     01-10      PT/INR - ( 10 Cristian 2022 07:36 )   PT: 12.6 sec;   INR: 1.10 ratio         PTT - ( 10 Cristian 2022 07:36 )  PTT:21.2 sec    CAPILLARY BLOOD GLUCOSE                PAST MEDICAL & SURGICAL HISTORY:  HTN (hypertension)    Hypothyroidism    Hyperlipidemia    Malignant neoplasm  left ovarian, s/p chemotherapy, 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 1000 milliLiter(s) (75 mL/Hr) IV Continuous <Continuous>  dextrose 5% + sodium chloride 0.9%. 1000 milliLiter(s) (100 mL/Hr) IV Continuous <Continuous>  enoxaparin Injectable 40 milliGRAM(s) SubCutaneous daily  influenza   Vaccine 0.5 milliLiter(s) IntraMuscular once  levothyroxine Injectable 37.5 MICROGram(s) IV Push at bedtime  metroNIDAZOLE  IVPB 500 milliGRAM(s) IV Intermittent every 8 hours  metroNIDAZOLE  IVPB      pantoprazole  Injectable 40 milliGRAM(s) IV Push daily    MEDICATIONS  (PRN):  morphine  - Injectable 2 milliGRAM(s) IV Push every 6 hours PRN Moderate Pain (4 - 6)to severe Pain  ondansetron Injectable 4 milliGRAM(s) IV Push every 8 hours PRN Nausea and/or Vomiting        RADIOLOGY & ADDITIONAL TESTS:    Imaging Personally Reviewed:  [ ] YES  [ ] NO    Consultant(s) Notes Reviewed:  [ ] YES  [ ] NO    PHYSICAL EXAM:  GENERAL: Alert and awake lying in bed in no distress  HEAD:  Atraumatic, Normocephalic  NGT in place  EYES: EOMI, BILLY, conjunctiva and sclera clear  NECK: Supple, No JVD, Normal thyroid  NERVOUS SYSTEM:  Alert & Oriented X3, Motor and sensory systems are intact,   CHEST/LUNG: Bilateral clear breath sounds, no rhochi, no wheezing, no crepitations,  HEART: Regular rate and rhythm; No murmurs, rubs, or gallops  ABDOMEN: Soft, Nontender,   mod/distended; Bowel sounds present  EXTREMITIES:   Peripheral Pulses are palpable, no  edema        Care Discussed with Consultants/Other Providers [ ] YES  [ ] NO      Code Status: [] Full Code [] DNR [] DNI [] Goals of Care:   Disposition: [] ICU [] Stroke Unit [] RCU []PCU []Floor [] Discharge Home         TEE TatumP

## 2022-01-10 NOTE — DIETITIAN INITIAL EVALUATION ADULT. - PROBLEM/PLAN-2
Pt reports vaginal discharge that started 3 days ago. Pt denies pain. Pt was treated for trichomonas a few weeks ago but did have unprotected sex with the person who was also suppose to be treated but didn't get treated. Denies N/V. Denies burning with urination.    DISPLAY PLAN FREE TEXT

## 2022-01-10 NOTE — PROGRESS NOTE ADULT - SUBJECTIVE AND OBJECTIVE BOX
Chief Complaint:  Patient is a 61y old  Female who presents with a chief complaint of Abdominal pain, cdiff, metastatic disease, SBO (10 Cristian 2022 12:29)      Interval Events: Patient comfortable. No new events.    Allergies:  No Known Allergies      Hospital Medications:  chlorhexidine 2% Cloths 1 Application(s) Topical daily  dextrose 5% + lactated ringers. 1000 milliLiter(s) IV Continuous <Continuous>  dextrose 5% + sodium chloride 0.9%. 1000 milliLiter(s) IV Continuous <Continuous>  enoxaparin Injectable 40 milliGRAM(s) SubCutaneous daily  influenza   Vaccine 0.5 milliLiter(s) IntraMuscular once  levothyroxine Injectable 37.5 MICROGram(s) IV Push at bedtime  metroNIDAZOLE  IVPB 500 milliGRAM(s) IV Intermittent every 8 hours  metroNIDAZOLE  IVPB      morphine  - Injectable 2 milliGRAM(s) IV Push every 6 hours PRN  ondansetron Injectable 4 milliGRAM(s) IV Push every 8 hours PRN  pantoprazole  Injectable 40 milliGRAM(s) IV Push daily      PMHX/PSHX:  HTN (hypertension)    Hypercholesterolemia    Hypothyroid    Hypothyroidism    Hyperlipidemia    Malignant neoplasm    No significant past surgical history    H/O breast surgery    S/P hysterectomy        Family history:  No pertinent family history in first degree relatives        ROS: As per HPI, 14-point ROS negative otherwise.    PHYSICAL EXAM:     Vital Signs:  Vital Signs Last 24 Hrs  T(C): 36.6 (10 Cristian 2022 10:51), Max: 36.6 (09 Jan 2022 15:00)  T(F): 97.8 (10 Cristian 2022 10:51), Max: 97.8 (09 Jan 2022 15:00)  HR: 50 (10 Cristian 2022 10:51) (50 - 90)  BP: 135/93 (10 Cristian 2022 10:51) (118/80 - 135/93)  BP(mean): --  RR: 17 (10 Cristian 2022 10:51) (17 - 18)  SpO2: 96% (10 Cristian 2022 10:51) (96% - 100%)  Daily     Daily     GENERAL:  appears comfortable, no acute distress  HEENT:  NC/AT,  conjunctivae clear, sclera -anicteric, NGT noted  CHEST:  no increased effort  HEART:  Regular rate and rhythm  ABDOMEN:  Soft, non-tender, non-distended  EXTREMITIES:  no cyanosis, clubbing or edema  SKIN:  No rash/erythema/ecchymoses/petechiae/wounds  NEURO:  Alert, oriented    LABS:                        11.9   15.82 )-----------( 198      ( 10 Cristian 2022 07:36 )             39.2     01-10    140  |  107  |  19  ----------------------------<  112<H>  3.9   |  23  |  0.65    Ca    8.2<L>      10 Cristian 2022 07:36  Phos  2.9     01-10  Mg     2.00     01-10        PT/INR - ( 10 Cristian 2022 07:36 )   PT: 12.6 sec;   INR: 1.10 ratio         PTT - ( 10 Cristian 2022 07:36 )  PTT:21.2 sec        Imaging:

## 2022-01-10 NOTE — DIETITIAN INITIAL EVALUATION ADULT. - CHIEF COMPLAINT
60 y/o woman with pmhx of hypothyroid, HTN, HLD, met. ovarian ca (liver, lung) s/p ALYSSA-BSO, debulking of neoplasm, and newly diagnosed gallbladder ca (unclear metastastic) presents as a transfer from OSH for further management of her cdiff as well as potential surgical management of her metastatic disease and SBO.

## 2022-01-10 NOTE — PROGRESS NOTE ADULT - ASSESSMENT
Impression:  # SBO secondary to cecal mass: Discussed with daughter at length today. Discussed risks and benefits of procedure. Risks being bleeding, infection, tear, perforation, peritonitis, and tube dislodgement. Also the possibility of not finding a window for placement. Also risks of anesthesia as general anesthesia likely required and small risk of mortality. Benefits include relief of nausea and vomiting once NGT removed and having some po. Explained this will not releive the underlying malignant obstruction. Daughter understood but wants to speak again and explain to her mother to allow for joint decision making. NGT in place with decompression of stomach but small bowel remains dilated.   # Metastatic gallbladder cancer: moving towards comfort measures  # Cdiff: improved on po vanco    Recommendation:  - Will tentatively plan for PEG tomorrow pending daughter discussion with patient  - NPO  - please trend CBC, CMP, INR and fever curve  - supportive care

## 2022-01-10 NOTE — DIETITIAN INITIAL EVALUATION ADULT. - OTHER INFO
Patient assessed for length of stay.   Patient NPO x 7 days.    Patient with metastatic gallbladder CA, Cdiff.  Per chart, family likely to pursue comfort measures.  Patient NPO, with SBO in setting of noted large cecal mass.  NGT to suction.  Noted plan is to undergo venting PEG placement per GI.    Last recorded weight as per HIE: 12/26/21 - 65.8kg; 10/27/21 - 77.7kg; 8/23/21 - 65.3kg; 4/5/21 - 64kg.  Adm / dosing 1/5/22 - 73kg, above previously recorded weights. No edema noted. Patient assessed for length of stay.   Patient NPO x 7 days.    Patient with metastatic gallbladder CA, Cdiff.  Per chart, family likely to pursue comfort measures.  Patient NPO, with SBO in setting of noted large cecal mass.  NGT to suction for decompression.  Noted plan is to possibly undergo venting PEG placement per GI, awaiting patient/family decision.    Last recorded weight as per HIE: 12/26/21 - 65.8kg; 10/27/21 - 77.7kg; 8/23/21 - 65.3kg; 4/5/21 - 64kg.  Adm / dosing 1/5/22 - 73kg, above previously recorded weights. No edema noted. Continue to trend inpatient weights.

## 2022-01-10 NOTE — DIETITIAN INITIAL EVALUATION ADULT. - PERTINENT MEDS FT
MEDICATIONS  (STANDING):  chlorhexidine 2% Cloths 1 Application(s) Topical daily  dextrose 5% + lactated ringers. 1000 milliLiter(s) (75 mL/Hr) IV Continuous <Continuous>  dextrose 5% + sodium chloride 0.9%. 1000 milliLiter(s) (100 mL/Hr) IV Continuous <Continuous>  enoxaparin Injectable 40 milliGRAM(s) SubCutaneous daily  influenza   Vaccine 0.5 milliLiter(s) IntraMuscular once  levothyroxine Injectable 37.5 MICROGram(s) IV Push at bedtime  metroNIDAZOLE  IVPB 500 milliGRAM(s) IV Intermittent every 8 hours  metroNIDAZOLE  IVPB      pantoprazole  Injectable 40 milliGRAM(s) IV Push daily    MEDICATIONS  (PRN):  morphine  - Injectable 2 milliGRAM(s) IV Push every 6 hours PRN Moderate Pain (4 - 6)to severe Pain  ondansetron Injectable 4 milliGRAM(s) IV Push every 8 hours PRN Nausea and/or Vomiting

## 2022-01-10 NOTE — PROGRESS NOTE ADULT - ASSESSMENT
60 yo F hypothyroid, ovarian CA, neoplasm, gallbladder CA, with abd pain, diarrhea  Leukocytosis, Afeb  CT with small bowel obstruction  C diff+  Not able to tolerate PO  Overall,  1) SBO  - NGT  - F/U surgery team  - Hold on standard abx unless signs bacterial infection--avoid exacerbation C diff  2) Leukocytosis  - Suspect reactive to SBO process  - Trend CBC  - Monitor for signs bacterial process  3) C diff  - Not able to tolerate PO vanco  - Flagyl 500mg q 8 IV, 14 days then DC  - Monitor stools if producing, monitor for signs megacolon    Signing off. Please call with further questions or change in status.    Chandan Andino MD  Pager [phone]  From 5pm-9am, and on weekends call [phone]

## 2022-01-10 NOTE — DIETITIAN INITIAL EVALUATION ADULT. - ADD RECOMMEND
1) Monitor weights,  skin integrity, pertinent labs. 2)  Please consider alternate means of nutrition given prolonged NPO status and in setting of SBO.

## 2022-01-11 LAB
ANION GAP SERPL CALC-SCNC: 10 MMOL/L — SIGNIFICANT CHANGE UP (ref 7–14)
BASOPHILS # BLD AUTO: 0.03 K/UL — SIGNIFICANT CHANGE UP (ref 0–0.2)
BASOPHILS NFR BLD AUTO: 0.2 % — SIGNIFICANT CHANGE UP (ref 0–2)
BUN SERPL-MCNC: 22 MG/DL — SIGNIFICANT CHANGE UP (ref 7–23)
CALCIUM SERPL-MCNC: 8.2 MG/DL — LOW (ref 8.4–10.5)
CHLORIDE SERPL-SCNC: 109 MMOL/L — HIGH (ref 98–107)
CO2 SERPL-SCNC: 23 MMOL/L — SIGNIFICANT CHANGE UP (ref 22–31)
CREAT SERPL-MCNC: 0.59 MG/DL — SIGNIFICANT CHANGE UP (ref 0.5–1.3)
EOSINOPHIL # BLD AUTO: 0.03 K/UL — SIGNIFICANT CHANGE UP (ref 0–0.5)
EOSINOPHIL NFR BLD AUTO: 0.2 % — SIGNIFICANT CHANGE UP (ref 0–6)
GLUCOSE SERPL-MCNC: 105 MG/DL — HIGH (ref 70–99)
HCT VFR BLD CALC: 38.1 % — SIGNIFICANT CHANGE UP (ref 34.5–45)
HGB BLD-MCNC: 11.8 G/DL — SIGNIFICANT CHANGE UP (ref 11.5–15.5)
IANC: 13.05 K/UL — HIGH (ref 1.5–8.5)
IMM GRANULOCYTES NFR BLD AUTO: 0.8 % — SIGNIFICANT CHANGE UP (ref 0–1.5)
LYMPHOCYTES # BLD AUTO: 1.7 K/UL — SIGNIFICANT CHANGE UP (ref 1–3.3)
LYMPHOCYTES # BLD AUTO: 10.8 % — LOW (ref 13–44)
MAGNESIUM SERPL-MCNC: 2 MG/DL — SIGNIFICANT CHANGE UP (ref 1.6–2.6)
MCHC RBC-ENTMCNC: 28.3 PG — SIGNIFICANT CHANGE UP (ref 27–34)
MCHC RBC-ENTMCNC: 31 GM/DL — LOW (ref 32–36)
MCV RBC AUTO: 91.4 FL — SIGNIFICANT CHANGE UP (ref 80–100)
MONOCYTES # BLD AUTO: 0.81 K/UL — SIGNIFICANT CHANGE UP (ref 0–0.9)
MONOCYTES NFR BLD AUTO: 5.1 % — SIGNIFICANT CHANGE UP (ref 2–14)
NEUTROPHILS # BLD AUTO: 13.05 K/UL — HIGH (ref 1.8–7.4)
NEUTROPHILS NFR BLD AUTO: 82.9 % — HIGH (ref 43–77)
NRBC # BLD: 0 /100 WBCS — SIGNIFICANT CHANGE UP
NRBC # FLD: 0 K/UL — SIGNIFICANT CHANGE UP
PHOSPHATE SERPL-MCNC: 2.9 MG/DL — SIGNIFICANT CHANGE UP (ref 2.5–4.5)
PLATELET # BLD AUTO: 180 K/UL — SIGNIFICANT CHANGE UP (ref 150–400)
POTASSIUM SERPL-MCNC: 4.4 MMOL/L — SIGNIFICANT CHANGE UP (ref 3.5–5.3)
POTASSIUM SERPL-SCNC: 4.4 MMOL/L — SIGNIFICANT CHANGE UP (ref 3.5–5.3)
RBC # BLD: 4.17 M/UL — SIGNIFICANT CHANGE UP (ref 3.8–5.2)
RBC # FLD: 18.7 % — HIGH (ref 10.3–14.5)
SODIUM SERPL-SCNC: 142 MMOL/L — SIGNIFICANT CHANGE UP (ref 135–145)
WBC # BLD: 15.75 K/UL — HIGH (ref 3.8–10.5)
WBC # FLD AUTO: 15.75 K/UL — HIGH (ref 3.8–10.5)

## 2022-01-11 PROCEDURE — 43246 EGD PLACE GASTROSTOMY TUBE: CPT | Mod: GC

## 2022-01-11 DEVICE — FEEDING TUBE PEG KIT MIC 20FR PULL: Type: IMPLANTABLE DEVICE | Status: FUNCTIONAL

## 2022-01-11 RX ADMIN — CHLORHEXIDINE GLUCONATE 1 APPLICATION(S): 213 SOLUTION TOPICAL at 13:00

## 2022-01-11 RX ADMIN — Medication 100 MILLIGRAM(S): at 09:38

## 2022-01-11 RX ADMIN — SODIUM CHLORIDE 75 MILLILITER(S): 9 INJECTION, SOLUTION INTRAVENOUS at 04:59

## 2022-01-11 RX ADMIN — PANTOPRAZOLE SODIUM 40 MILLIGRAM(S): 20 TABLET, DELAYED RELEASE ORAL at 13:25

## 2022-01-11 RX ADMIN — Medication 100 MILLIGRAM(S): at 03:26

## 2022-01-11 RX ADMIN — MORPHINE SULFATE 2 MILLIGRAM(S): 50 CAPSULE, EXTENDED RELEASE ORAL at 18:05

## 2022-01-11 RX ADMIN — MORPHINE SULFATE 2 MILLIGRAM(S): 50 CAPSULE, EXTENDED RELEASE ORAL at 17:44

## 2022-01-11 RX ADMIN — Medication 100 MILLIGRAM(S): at 17:44

## 2022-01-11 RX ADMIN — ENOXAPARIN SODIUM 40 MILLIGRAM(S): 100 INJECTION SUBCUTANEOUS at 13:25

## 2022-01-11 NOTE — PROGRESS NOTE ADULT - SUBJECTIVE AND OBJECTIVE BOX
INES DOCKERY  61y  Female      Patient is a 61y old  Female who presents with a chief complaint of Abdominal pain, cdiff, metastatic disease, SBO (10 Cristian 2022 20:14)  s/p PEG placement.Doing well.no abd pain,no vomiting,no sob,no fever,no cough.alert,awake    REVIEW OF SYSTEMS:  as above  INTERVAL HPI/OVERNIGHT EVENTS:  T(C): 36.7 (01-11-22 @ 14:00), Max: 36.7 (01-11-22 @ 11:19)  HR: 75 (01-11-22 @ 14:00) (75 - 106)  BP: 120/60 (01-11-22 @ 14:00) (115/61 - 135/85)  RR: 18 (01-11-22 @ 12:20) (15 - 20)  SpO2: 100% (01-11-22 @ 14:00) (95% - 100%)  Wt(kg): --  I&O's Summary    10 Cristian 2022 07:01  -  11 Jan 2022 07:00  --------------------------------------------------------  IN: 700 mL / OUT: 1050 mL / NET: -350 mL    11 Jan 2022 07:01  -  11 Jan 2022 20:09  --------------------------------------------------------  IN: 0 mL / OUT: 50 mL / NET: -50 mL      T(C): 36.7 (01-11-22 @ 14:00), Max: 36.7 (01-11-22 @ 11:19)  HR: 75 (01-11-22 @ 14:00) (75 - 106)  BP: 120/60 (01-11-22 @ 14:00) (115/61 - 135/85)  RR: 18 (01-11-22 @ 12:20) (15 - 20)  SpO2: 100% (01-11-22 @ 14:00) (95% - 100%)  Wt(kg): --Vital Signs Last 24 Hrs  T(C): 36.7 (11 Jan 2022 14:00), Max: 36.7 (11 Jan 2022 11:19)  T(F): 98 (11 Jan 2022 14:00), Max: 98 (11 Jan 2022 11:19)  HR: 75 (11 Jan 2022 14:00) (75 - 106)  BP: 120/60 (11 Jan 2022 14:00) (115/61 - 135/85)  BP(mean): --  RR: 18 (11 Jan 2022 12:20) (15 - 20)  SpO2: 100% (11 Jan 2022 14:00) (95% - 100%)    LABS:                        11.8   15.75 )-----------( 180      ( 11 Jan 2022 06:31 )             38.1     01-11    142  |  109<H>  |  22  ----------------------------<  105<H>  4.4   |  23  |  0.59    Ca    8.2<L>      11 Jan 2022 06:31  Phos  2.9     01-11  Mg     2.00     01-11      PT/INR - ( 10 Cristian 2022 07:36 )   PT: 12.6 sec;   INR: 1.10 ratio         PTT - ( 10 Cristian 2022 07:36 )  PTT:21.2 sec    CAPILLARY BLOOD GLUCOSE                PAST MEDICAL & SURGICAL HISTORY:  HTN (hypertension)    Hypothyroidism    Hyperlipidemia    Malignant neoplasm  left ovarian, s/p chemotherapy, 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 1000 milliLiter(s) (75 mL/Hr) IV Continuous <Continuous>  dextrose 5% + sodium chloride 0.9%. 1000 milliLiter(s) (100 mL/Hr) IV Continuous <Continuous>  enoxaparin Injectable 40 milliGRAM(s) SubCutaneous daily  influenza   Vaccine 0.5 milliLiter(s) IntraMuscular once  levothyroxine Injectable 37.5 MICROGram(s) IV Push at bedtime  metroNIDAZOLE  IVPB 500 milliGRAM(s) IV Intermittent every 8 hours  metroNIDAZOLE  IVPB      pantoprazole  Injectable 40 milliGRAM(s) IV Push daily    MEDICATIONS  (PRN):  morphine  - Injectable 2 milliGRAM(s) IV Push every 6 hours PRN Moderate Pain (4 - 6)to severe Pain  ondansetron Injectable 4 milliGRAM(s) IV Push every 8 hours PRN Nausea and/or Vomiting        RADIOLOGY & ADDITIONAL TESTS:    Imaging Personally Reviewed:  [ ] YES  [ ] NO    Consultant(s) Notes Reviewed:  [ x] YES  [ ] NO    PHYSICAL EXAM:  GENERAL: Alert and awake lying in bed in no distress  HEAD:  Atraumatic, Normocephalic  EYES: EOMI, BILLY, conjunctiva and sclera clear  NECK: Supple, No JVD, Normal thyroid  NERVOUS SYSTEM:  Alert & Oriented X3, Motor and sensory systems are intact,   CHEST/LUNG: Bilateral clear breath sounds, no rhochi, no wheezing, no crepitations,  HEART: Regular rate and rhythm; No murmurs, rubs, or gallops  ABDOMEN: Soft, Nontender, Nondistended; Bowel sounds present,s/p PEG  EXTREMITIES:   Peripheral Pulses are palpable, no  edema        Care Discussed with Consultants/Other Providers [X ] YES  [ ] NO      Code Status: [] Full Code [] DNR [] DNI [] Goals of Care:   Disposition: [] ICU [] Stroke Unit [] RCU []PCU []Floor [] Discharge Home         CHAY TatumFACP

## 2022-01-11 NOTE — PRE-OP CHECKLIST - BLOOD AVAILABLE
Today's Date: 04/07/2020     Referring Provider: No ref. provider found     Chief Complaint:   Chief Complaint   Patient presents with    Cough    Nausea    Emesis       HPI: Racheal Donaldson is a 34 y.o. female  with past medical history of hypercoagulable state secondary to MTHFR mutation and anticardiolipin antibody who presents today for evaluation for 1 day history of fever with a T-max of 104°, headache, myalgias, dry hacking cough, and dyspnea.  She also complains of decreased appetite.  She denies any change in taste or smell, diarrhea, abdominal pain, or  symptoms.  She was tested for the flu and strep approximately 2 days ago which was negative.     Review of Systems   Constitutional: Positive for chills, fever and malaise/fatigue.   HENT: Positive for sore throat. Negative for congestion and sinus pain.    Eyes: Negative for double vision and photophobia.   Respiratory: Positive for cough and shortness of breath. Negative for sputum production and wheezing.    Cardiovascular: Negative for chest pain and palpitations.   Gastrointestinal: Negative for abdominal pain, diarrhea, nausea and vomiting.   Genitourinary: Negative for dysuria and urgency.   Musculoskeletal: Positive for myalgias. Negative for neck pain.   Skin: Negative for itching and rash.   Neurological: Positive for headaches. Negative for dizziness.   Endo/Heme/Allergies: Negative for environmental allergies. Does not bruise/bleed easily.   Psychiatric/Behavioral: Negative.         Social History     Socioeconomic History    Marital status:      Spouse name: Robby    Number of children: 2    Years of education: Not on file    Highest education level: Not on file   Occupational History    Occupation: Chiropractor     Employer: OTHER   Social Needs    Financial resource strain: Not on file    Food insecurity:     Worry: Not on file     Inability: Not on file    Transportation needs:     Medical: Not on file     Non-medical:  Not on file   Tobacco Use    Smoking status: Never Smoker    Smokeless tobacco: Never Used   Substance and Sexual Activity    Alcohol use: Yes    Drug use: Yes     Types: Marijuana    Sexual activity: Yes     Partners: Male     Birth control/protection: Condom, Other-see comments     Comment: Patient previously had Mirena placed for 2 years and desires removal today (8/12/14)   Lifestyle    Physical activity:     Days per week: Not on file     Minutes per session: Not on file    Stress: Not on file   Relationships    Social connections:     Talks on phone: Not on file     Gets together: Not on file     Attends [employer] service: Not on file     Active member of club or organization: Not on file     Attends meetings of clubs or organizations: Not on file     Relationship status: Not on file   Other Topics Concern    Not on file   Social History Narrative    Not on file       Family History   Problem Relation Age of Onset    Asthma Mother     COPD Mother     Diabetes Mother     Diabetes Father     Heart disease Father        Current Outpatient Medications on File Prior to Visit   Medication Sig Dispense Refill    albuterol 90 mcg/actuation inhaler Inhale 2 puffs into the lungs every 6 (six) hours as needed for Wheezing or Shortness of Breath. 18 g 0    ALPRAZolam (XANAX) 2 MG Tab TAKE 1 TABLET BY MOUTH 1-2 TIMES DAILY AS NEEDED  3    ELIQUIS 5 mg Tab Take 1 tablet (5 mg total) by mouth 2 (two) times daily. (Patient taking differently: Take 2.5 mg by mouth 2 (two) times daily. ) 60 tablet 3    FLUoxetine 20 MG capsule Take 20 mg by mouth once daily.  6    HYDROcodone-acetaminophen (NORCO)  mg per tablet       ondansetron (ZOFRAN) 4 MG tablet Take 1 tablet (4 mg total) by mouth every 6 (six) hours as needed for Nausea. 60 tablet 1    spironolactone (ALDACTONE) 50 MG tablet Take 50 mg by mouth once daily.       No current facility-administered medications on file prior to visit.         Review  of patient's allergies indicates:   Allergen Reactions    Diazepam     Other omega-3s     Zolpidem     Oxycodone (bulk)     Trazodone (bulk)        Past Surgical History:   Procedure Laterality Date    ABDOMINAL SURGERY      APPENDECTOMY      NASAL SEPTUM SURGERY  2005       Past Medical History:   Diagnosis Date    Abnormal Pap smear 2011    colpo done ?biopsy pregnant with son; next pap WNL PP     Acute deep vein thrombosis (DVT) of tibial vein of left lower extremity 1/28/2019    Anticardiolipin antibody positive 4/2/2019    Bell palsy may 2013    Heterozygous MTHFR mutation [id number] 4/2/2019    Hx of migraines     No Aura       Vitals:    04/07/20 1112   BP: 114/70   Pulse: 79   Temp: 97.8 °F (36.6 °C)   SpO2 96% on RA    Physical Exam   Constitutional: She is oriented to person, place, and time. She appears well-developed and well-nourished. She appears ill. No distress.   HENT:   Head: Normocephalic and atraumatic.   Eyes: Pupils are equal, round, and reactive to light. EOM are normal.   Cardiovascular: Normal rate, regular rhythm and normal heart sounds.   Pulmonary/Chest: Effort normal and breath sounds normal. No respiratory distress. She has no decreased breath sounds. She has no wheezes. She has no rhonchi. She has no rales.   Abdominal: Normal appearance. She exhibits no distension.   Neurological: She is alert and oriented to person, place, and time. No cranial nerve deficit.   Skin: Skin is warm and dry.   Psychiatric: She has a normal mood and affect. Her behavior is normal. Judgment and thought content normal.      Ortho Exam     Cough  -     SARS- CoV-2 (COVID-19) QUALITATIVE PCR    Heterozygous MTHFR mutation [id number]    Anticardiolipin antibody positive    Fever, unspecified fever cause    Myalgia    Dyspnea, unspecified type    Chest tightness    Acute nonintractable headache, unspecified headache type    Hypercoagulopathy    Other orders  -     COVID-19 Home Symptom Monitoring  -  Duration (days): 14           PLAN:   Racheal Donaldson is a 34 y.o. female with past medical history of hypercoagulability secondary MTHFR mutation and anticardiolipin antibody with 24 hr history of fever with a T-max of 104°, headache, myalgias, dry cough, and dyspnea.  she was negative for the FLU and strep. SPO2 is 96% on room air. Lungs are clear to auscultation. she was counseled on the potential diagnosis. COVID-19 testing was ordered today.  They are advised to self quarantine until 3days after their last fever/symptom without the use of medication.  she may take tylenol q6 prn for fever/pain. she was advised to avoid NSAIDs for the time being. Conservative care with oral rehydration as needed.  she was briefly counseled on the signs/symptoms of respiratory distress and when to present to the ED. she will be set up with [facility] COVID-19 home symptom monitoring program for 14 days.        Moshe Obregon D.O.          CC: Patients PCP: Preston Plaza MD  CC: Referring Provider: No ref. provider found                      n/a

## 2022-01-12 LAB
ANION GAP SERPL CALC-SCNC: 13 MMOL/L — SIGNIFICANT CHANGE UP (ref 7–14)
BASOPHILS # BLD AUTO: 0.04 K/UL — SIGNIFICANT CHANGE UP (ref 0–0.2)
BASOPHILS NFR BLD AUTO: 0.2 % — SIGNIFICANT CHANGE UP (ref 0–2)
BUN SERPL-MCNC: 20 MG/DL — SIGNIFICANT CHANGE UP (ref 7–23)
CALCIUM SERPL-MCNC: 8.2 MG/DL — LOW (ref 8.4–10.5)
CHLORIDE SERPL-SCNC: 112 MMOL/L — HIGH (ref 98–107)
CO2 SERPL-SCNC: 23 MMOL/L — SIGNIFICANT CHANGE UP (ref 22–31)
CREAT SERPL-MCNC: 0.65 MG/DL — SIGNIFICANT CHANGE UP (ref 0.5–1.3)
EOSINOPHIL # BLD AUTO: 0.04 K/UL — SIGNIFICANT CHANGE UP (ref 0–0.5)
EOSINOPHIL NFR BLD AUTO: 0.2 % — SIGNIFICANT CHANGE UP (ref 0–6)
GLUCOSE SERPL-MCNC: 95 MG/DL — SIGNIFICANT CHANGE UP (ref 70–99)
HCT VFR BLD CALC: 38.1 % — SIGNIFICANT CHANGE UP (ref 34.5–45)
HGB BLD-MCNC: 12 G/DL — SIGNIFICANT CHANGE UP (ref 11.5–15.5)
IANC: 16.42 K/UL — HIGH (ref 1.5–8.5)
IMM GRANULOCYTES NFR BLD AUTO: 0.9 % — SIGNIFICANT CHANGE UP (ref 0–1.5)
LYMPHOCYTES # BLD AUTO: 1.72 K/UL — SIGNIFICANT CHANGE UP (ref 1–3.3)
LYMPHOCYTES # BLD AUTO: 8.8 % — LOW (ref 13–44)
MAGNESIUM SERPL-MCNC: 1.9 MG/DL — SIGNIFICANT CHANGE UP (ref 1.6–2.6)
MCHC RBC-ENTMCNC: 28.3 PG — SIGNIFICANT CHANGE UP (ref 27–34)
MCHC RBC-ENTMCNC: 31.5 GM/DL — LOW (ref 32–36)
MCV RBC AUTO: 89.9 FL — SIGNIFICANT CHANGE UP (ref 80–100)
MONOCYTES # BLD AUTO: 1.06 K/UL — HIGH (ref 0–0.9)
MONOCYTES NFR BLD AUTO: 5.4 % — SIGNIFICANT CHANGE UP (ref 2–14)
NEUTROPHILS # BLD AUTO: 16.42 K/UL — HIGH (ref 1.8–7.4)
NEUTROPHILS NFR BLD AUTO: 84.5 % — HIGH (ref 43–77)
NRBC # BLD: 0 /100 WBCS — SIGNIFICANT CHANGE UP
NRBC # FLD: 0 K/UL — SIGNIFICANT CHANGE UP
PHOSPHATE SERPL-MCNC: 2.8 MG/DL — SIGNIFICANT CHANGE UP (ref 2.5–4.5)
PLATELET # BLD AUTO: 218 K/UL — SIGNIFICANT CHANGE UP (ref 150–400)
POTASSIUM SERPL-MCNC: 3.6 MMOL/L — SIGNIFICANT CHANGE UP (ref 3.5–5.3)
POTASSIUM SERPL-SCNC: 3.6 MMOL/L — SIGNIFICANT CHANGE UP (ref 3.5–5.3)
RBC # BLD: 4.24 M/UL — SIGNIFICANT CHANGE UP (ref 3.8–5.2)
RBC # FLD: 19.3 % — HIGH (ref 10.3–14.5)
SODIUM SERPL-SCNC: 148 MMOL/L — HIGH (ref 135–145)
WBC # BLD: 19.45 K/UL — HIGH (ref 3.8–10.5)
WBC # FLD AUTO: 19.45 K/UL — HIGH (ref 3.8–10.5)

## 2022-01-12 PROCEDURE — 99232 SBSQ HOSP IP/OBS MODERATE 35: CPT | Mod: GC

## 2022-01-12 RX ADMIN — SODIUM CHLORIDE 75 MILLILITER(S): 9 INJECTION, SOLUTION INTRAVENOUS at 01:20

## 2022-01-12 RX ADMIN — Medication 100 MILLIGRAM(S): at 01:20

## 2022-01-12 RX ADMIN — Medication 37.5 MICROGRAM(S): at 22:36

## 2022-01-12 RX ADMIN — Medication 37.5 MICROGRAM(S): at 01:26

## 2022-01-12 RX ADMIN — Medication 100 MILLIGRAM(S): at 17:53

## 2022-01-12 RX ADMIN — Medication 100 MILLIGRAM(S): at 10:03

## 2022-01-12 RX ADMIN — MORPHINE SULFATE 2 MILLIGRAM(S): 50 CAPSULE, EXTENDED RELEASE ORAL at 20:21

## 2022-01-12 RX ADMIN — PANTOPRAZOLE SODIUM 40 MILLIGRAM(S): 20 TABLET, DELAYED RELEASE ORAL at 14:44

## 2022-01-12 RX ADMIN — CHLORHEXIDINE GLUCONATE 1 APPLICATION(S): 213 SOLUTION TOPICAL at 12:23

## 2022-01-12 RX ADMIN — MORPHINE SULFATE 2 MILLIGRAM(S): 50 CAPSULE, EXTENDED RELEASE ORAL at 21:21

## 2022-01-12 RX ADMIN — ENOXAPARIN SODIUM 40 MILLIGRAM(S): 100 INJECTION SUBCUTANEOUS at 12:14

## 2022-01-12 NOTE — PROGRESS NOTE ADULT - SUBJECTIVE AND OBJECTIVE BOX
Chief Complaint:  Patient is a 61y old  Female who presents with a chief complaint of Abdominal pain, cdiff, metastatic disease, SBO (12 Jan 2022 11:32)      Reason for consult: venting peg    Interval Events:   s/p venting peg 1/11  Afebrile w/o pain on exam    Hospital Medications:  chlorhexidine 2% Cloths 1 Application(s) Topical daily  dextrose 5% + lactated ringers. 1000 milliLiter(s) IV Continuous <Continuous>  dextrose 5% + sodium chloride 0.9%. 1000 milliLiter(s) IV Continuous <Continuous>  enoxaparin Injectable 40 milliGRAM(s) SubCutaneous daily  influenza   Vaccine 0.5 milliLiter(s) IntraMuscular once  levothyroxine Injectable 37.5 MICROGram(s) IV Push at bedtime  metroNIDAZOLE  IVPB 500 milliGRAM(s) IV Intermittent every 8 hours  metroNIDAZOLE  IVPB      morphine  - Injectable 2 milliGRAM(s) IV Push every 6 hours PRN  ondansetron Injectable 4 milliGRAM(s) IV Push every 8 hours PRN  pantoprazole  Injectable 40 milliGRAM(s) IV Push daily      ROS:   Complete and normal except as mentioned above.    PHYSICAL EXAM:   Vital Signs:  Vital Signs Last 24 Hrs  T(C): 36.8 (12 Jan 2022 08:00), Max: 37.1 (12 Jan 2022 04:00)  T(F): 98.3 (12 Jan 2022 08:00), Max: 98.8 (12 Jan 2022 04:00)  HR: 87 (12 Jan 2022 08:00) (75 - 97)  BP: 115/80 (12 Jan 2022 08:00) (115/80 - 130/85)  BP(mean): --  RR: 18 (12 Jan 2022 08:00) (18 - 18)  SpO2: 98% (12 Jan 2022 08:00) (95% - 100%)  Daily     Daily     GENERAL:  appears comfortable, no acute distress  HEENT:  NC/AT,  conjunctivae clear, sclera -anicteric, NGT noted  CHEST:  no increased effort  HEART:  Regular rate and rhythm  ABDOMEN:  Soft, non-tender, non-distended; peg site c/d/i    EXTREMITIES:  no cyanosis, clubbing or edema  SKIN:  No rash/erythema/ecchymoses/petechiae/wounds  NEURO:  Alert, oriented    LABS: reviewed                        12.0   19.45 )-----------( 218      ( 12 Jan 2022 08:03 )             38.1     01-12    148<H>  |  112<H>  |  20  ----------------------------<  95  3.6   |  23  |  0.65    Ca    8.2<L>      12 Jan 2022 08:03  Phos  2.8     01-12  Mg     1.90     01-12          Interval Diagnostic Studies: see sunrise for full report

## 2022-01-12 NOTE — PROGRESS NOTE ADULT - ASSESSMENT
Impression:  # SBO secondary to cecal mass s/p venting PEG for drainage. Daughter understands this will not relieve patient's underlying malignant obstruction.   # Metastatic gallbladder cancer: moving towards comfort measures  # Cdiff: improved on po vanco    Recommendation:  - Supportive care  - Palliative feeds per primary  - No additional intervention indicated from GI at this time    Thank you for involving us in this patient's care. Please reach out if any further questions or concerns. Signing off.    Case discussed with Attending, Dr. Emilee Webb.    Betzaida Malhotra MD  Gastroenterology/Hepatology Fellow, PGY-V    NON-URGENT CONSULTS:  Please email [email or web address]  [email or web address]  AT NIGHT AND ON WEEKENDS:  Contact on-call GI fellow via answering service ([phone]) from 5pm-8am and on weekends/holidays  MONDAY-FRIDAY 8AM-5PM:  Pager# [phone] ([facility])  GI Phone# [phone] ([facility]) Impression:  # SBO secondary to cecal mass s/p venting PEG for drainage. Daughter understands this will not relieve patient's underlying malignant obstruction.   # Metastatic gallbladder cancer: moving towards comfort measures  # Cdiff: improved on po vanco    Recommendation:  - Supportive care  - Palliative feeds per primary  - No additional intervention indicated from GI at this time  - PEG placed for venting reasons  - diet/comfort feeds as tolerated    Thank you for involving us in this patient's care. Please reach out if any further questions or concerns. Signing off.    Case discussed with Attending, Dr. Emilee Webb.    Betzaida Malhotra MD  Gastroenterology/Hepatology Fellow, PGY-V    NON-URGENT CONSULTS:  Please email [email or web address]  AT NIGHT AND ON WEEKENDS:  Contact on-call GI fellow via answering service ([phone]) from 5pm-8am and on weekends/holidays  MONDAY-FRIDAY 8AM-5PM:  Pager# [phone] ([facility])  GI Phone# [phone] ([facility])

## 2022-01-12 NOTE — PROGRESS NOTE ADULT - SUBJECTIVE AND OBJECTIVE BOX
Patient is a 61y old  Female who presents with a chief complaint of Abdominal pain, cdiff, metastatic disease, SBO (11 Jan 2022 20:09)    Patient seen this morning. No new complaints. S/P PEG placement yesterday. Spoke with daughter on phone as well.    MEDICATIONS  (STANDING):  chlorhexidine 2% Cloths 1 Application(s) Topical daily  dextrose 5% + lactated ringers. 1000 milliLiter(s) (75 mL/Hr) IV Continuous <Continuous>  dextrose 5% + sodium chloride 0.9%. 1000 milliLiter(s) (100 mL/Hr) IV Continuous <Continuous>  enoxaparin Injectable 40 milliGRAM(s) SubCutaneous daily  influenza   Vaccine 0.5 milliLiter(s) IntraMuscular once  levothyroxine Injectable 37.5 MICROGram(s) IV Push at bedtime  metroNIDAZOLE  IVPB 500 milliGRAM(s) IV Intermittent every 8 hours  metroNIDAZOLE  IVPB      pantoprazole  Injectable 40 milliGRAM(s) IV Push daily    MEDICATIONS  (PRN):  morphine  - Injectable 2 milliGRAM(s) IV Push every 6 hours PRN Moderate Pain (4 - 6)to severe Pain  ondansetron Injectable 4 milliGRAM(s) IV Push every 8 hours PRN Nausea and/or Vomiting      ROS  No fever, sweats, chills  No epistaxis, HA, sore throat  No CP, SOB, cough, sputum  No n/v/d, abd pain, melena, hematochezia  NPO S/P PEG placement  No edema  No rash  No anxiety  No back pain, joint pain  No bleeding, bruising  No dysuria, hematuria    Vital Signs Last 24 Hrs  T(C): 36.8 (12 Jan 2022 08:00), Max: 37.1 (12 Jan 2022 04:00)  T(F): 98.3 (12 Jan 2022 08:00), Max: 98.8 (12 Jan 2022 04:00)  HR: 87 (12 Jan 2022 08:00) (75 - 106)  BP: 115/80 (12 Jan 2022 08:00) (115/80 - 135/85)  BP(mean): --  RR: 18 (12 Jan 2022 08:00) (15 - 20)  SpO2: 98% (12 Jan 2022 08:00) (95% - 100%)    PE  NAD  Awake, alert  Anicteric, MMM  PEG placement  No c/c/e  No rash grossly                            12.0   19.45 )-----------( 218      ( 12 Jan 2022 08:03 )             38.1       01-12    148<H>  |  112<H>  |  20  ----------------------------<  95  3.6   |  23  |  0.65    Ca    8.2<L>      12 Jan 2022 08:03  Phos  2.8     01-12  Mg     1.90     01-12      [facility]  _______________________________________________________________________________  Patient Name: Jonel Gupta             Procedure Date: 1/11/2022 11:29 AM  MRN: [id number]                     Account Number: [id number]  YOB: 1960              Admit Type: Inpatient  Room: [place] [address]                         Gender: Female  Attending MD: Emilee Webb MD       _______________________________________________________________________________     Procedure:           Upper GI endoscopy  Indications:         Place PEG due to aspiration risk  Providers:           Emilee Webb MD, Lukas Almodovar (Fellow), WARREN MONSALVE MD (Fellow)  Referring MD:        Inpatient Service  Medicines:           Propofol per Anesthesia, Ancef 2000 mg IV  Complications:       No immediate complications.  Procedure:           Pre-Anesthesia Assessment:                       - Prior to the procedure, a History and Physical was                        performed, and patient medications and allergies were                        reviewed. The patient is competent. The risks and                        benefits of the procedure and the sedation options and                        risks were discussed with the patient. All questions                        were answered and informed consent was obtained. Patient                        identification and proposed procedure were verified by                        the physician, the nurse and the anesthetist in the                        procedure room. Mental Status Examination: alert and                        oriented. Prophylactic Antibiotics: The patient requires                        prophylactic antibiotics for planned PEG placement. The                        patient received antibiotic therapy today, before the                        procedure started. Prior Anticoagulants: The patient has                        taken no previous anticoagulant or antiplatelet agents.                        ASA Grade Assessment: IV - A patient with severe                        systemic disease that is a constant threat to life.                        After reviewing the risks and benefits, the patient was                        deemed in satisfactory condition to undergo the                        procedure. The anesthesia plan was to use deep sedation                        / analgesia. Immediately prior to administration of                        medications, the patient was re-assessed for adequacy to                        receive sedatives. The heart rate, respiratory rate,                        oxygen saturations, blood pressure, adequacy of                        pulmonary ventilation, and response to care were                        monitored throughout the procedure. The physical status                        of the patient was re-assessed after the procedure.                       After obtaining informed consent, the endoscope was                        passed under direct vision. Throughout the procedure,                        the patient's blood pressure, pulse, and oxygen                        saturations were monitored continuously. The Endoscope                        was introduced through the mouth, and advanced to the                        second part of duodenum. The upper GI endoscopy was                        accomplished without difficulty. The patient tolerated                        the procedure well.                                                                                   Findings:       The examined esophagus was normal.       A few localized 1 to 3 mm erosions were found in the gastric body.       The examined duodenum was normal.       The patient was placed in the supine position for PEG placement. The        stomach was insufflated to appose gastric and abdominal walls. A site        was located in the body of the stomach with good transillumination and        manual external pressure for placement. The abdominal wall was marked        and prepped in a sterile manner. The area was anesthetized with 4 mL of        1% lidocaine. The trocar needle was introduced through the abdominal        wall and into the stomach under direct endoscopic view. A snare was        introduced through the endoscope and opened in the gastric lumen. The        guide wire was passed through the trocar and into the open snare. The        snare was closed around the guide wire. The endoscope and snare were        removed, pulling the wire out through the mouth. A skin incision was        made at the site of needle insertion. The endoscopically removable 20 Fr        Bard gastrostomy tube was lubricated. The G-tube was tied to the guide        wire and pulled through the mouth and into the stomach. The trocar        needle was removed, and the gastrostomy tube was pulled out from the        stomach through the skin. The external bumper was attached to the        gastrostomy tube, and the tube was cut to remove the guide wire. The        final position of the gastrostomy tube was confirmed by relook        endoscopy, and skin marking noted to be 3.5 cm at the external bumper.        The final tension and compression of the abdominal wall by the PEG tube        and external bumper were checked and revealed that the bumper was        moderately tight and mildly deforming the skin. The feeding tube was        capped, and the tube site cleaned and dressed.                                                                                   Impression:          - Normal esophagus.                       - Erosive gastropathy.                       - Normal examined duodenum.                       - An endoscopically removable PEG placement was                        successfully completed.                       - No specimens collected.  Recommendation:      - [facility] care.                       - Please follow the post-PEG recommendations including:                        Nutrition consult for formula and volume, advance food                        and medications per primary care provider, NPO x4 hrs                        then water today, may use PEG today for meds and water,                        may use PEG tomorrow for feedings, antibiotic ointment                        to site and clean site with soap and water daily and dry                        thoroughly.                                                                                   Attending Participation:       I was present and participated during the entire procedure, including        non-key portions.                                                                                     __________________  Emilee Webb MD  1/11/2022 11:48:38 AM  This report has been signed electronically.  Number of Addenda: 0

## 2022-01-12 NOTE — PROGRESS NOTE ADULT - ASSESSMENT
INES GUPTA is a 61y Female who presents with a chief complaint of nausea.    Metastatic Gallbladder Adenocarcinoma  - Patient currently on active treatment gemcitabine + oxaliplatin. Last treatment 12/13/2021  - No chemotherapy while inpatient.   - on going discussion with [facility] care     Clostridioides difficile Infection  - Patient tested positive, which can explain her diarrhea.  - ID consulted, on IV flagyl    Nausea with Cecal Mass  -High Grade SBO at level of Cecum  - Surgery saw patient - unable to do any intervention  - S/P PEG placement 1/11/2022 (not clear if this is venting PEG)    After discharge patient may F/U with Dr. Lisa Kyle of [facility]    Thank you for the opportunity to participate in Ms. Gupta's care.    HUMPHREY TafoyaC  Hematology/Oncology  [facility]   [phone] (cell)  [phone] (office)  [phone] (alt office)  Evenings and weekends please call MD on call or office

## 2022-01-12 NOTE — PROGRESS NOTE ADULT - SUBJECTIVE AND OBJECTIVE BOX
INES DOCKERY  61y  Female      Patient is a 61y old  Female who presents with a chief complaint of Abdominal pain, cdiff, metastatic disease, SBO (12 Jan 2022 12:11)  comfortable,no new c/o    REVIEW OF SYSTEMS:  CONSTITUTIONAL: No fever  RESPIRATORY: No cough, hemoptysis or shortness of breath  CARDIOVASCULAR: No chest pain, palpitations, dizziness, or leg swelling  GASTROINTESTINAL: No abdominal pain. nausea, vomiting, hematemesis  GENITOURINARY: No dysuria, frequency, hematuria   NEUROLOGICAL: No headaches, no dizziness  MUSCULOSKELETAL: No joint pain or swelling;     INTERVAL HPI/OVERNIGHT EVENTS:  T(C): 36.7 (01-12-22 @ 14:00), Max: 37.1 (01-12-22 @ 04:00)  HR: 118 (01-12-22 @ 14:00) (87 - 118)  BP: 120/75 (01-12-22 @ 14:00) (115/80 - 120/75)  RR: 18 (01-12-22 @ 14:00) (18 - 18)  SpO2: 98% (01-12-22 @ 14:00) (98% - 100%)  Wt(kg): --  I&O's Summary    11 Jan 2022 07:01  -  12 Jan 2022 07:00  --------------------------------------------------------  IN: 0 mL / OUT: 50 mL / NET: -50 mL      T(C): 36.7 (01-12-22 @ 14:00), Max: 37.1 (01-12-22 @ 04:00)  HR: 118 (01-12-22 @ 14:00) (87 - 118)  BP: 120/75 (01-12-22 @ 14:00) (115/80 - 120/75)  RR: 18 (01-12-22 @ 14:00) (18 - 18)  SpO2: 98% (01-12-22 @ 14:00) (98% - 100%)  Wt(kg): --Vital Signs Last 24 Hrs  T(C): 36.7 (12 Jan 2022 14:00), Max: 37.1 (12 Jan 2022 04:00)  T(F): 98 (12 Jan 2022 14:00), Max: 98.8 (12 Jan 2022 04:00)  HR: 118 (12 Jan 2022 14:00) (87 - 118)  BP: 120/75 (12 Jan 2022 14:00) (115/80 - 120/75)  BP(mean): --  RR: 18 (12 Jan 2022 14:00) (18 - 18)  SpO2: 98% (12 Jan 2022 14:00) (98% - 100%)    LABS:                        12.0   19.45 )-----------( 218      ( 12 Jan 2022 08:03 )             38.1     01-12    148<H>  |  112<H>  |  20  ----------------------------<  95  3.6   |  23  |  0.65    Ca    8.2<L>      12 Jan 2022 08:03  Phos  2.8     01-12  Mg     1.90     01-12          CAPILLARY BLOOD GLUCOSE                PAST MEDICAL & SURGICAL HISTORY:  HTN (hypertension)    Hypothyroidism    Hyperlipidemia    Malignant neoplasm  left ovarian, s/p chemotherapy, 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 1000 milliLiter(s) (75 mL/Hr) IV Continuous <Continuous>  dextrose 5% + sodium chloride 0.9%. 1000 milliLiter(s) (100 mL/Hr) IV Continuous <Continuous>  enoxaparin Injectable 40 milliGRAM(s) SubCutaneous daily  influenza   Vaccine 0.5 milliLiter(s) IntraMuscular once  levothyroxine Injectable 37.5 MICROGram(s) IV Push at bedtime  metroNIDAZOLE  IVPB 500 milliGRAM(s) IV Intermittent every 8 hours  metroNIDAZOLE  IVPB      pantoprazole  Injectable 40 milliGRAM(s) IV Push daily    MEDICATIONS  (PRN):  ondansetron Injectable 4 milliGRAM(s) IV Push every 8 hours PRN Nausea and/or Vomiting        RADIOLOGY & ADDITIONAL TESTS:    Imaging Personally Reviewed:  [ ] YES  [ ] NO    Consultant(s) Notes Reviewed:  [x ] YES  [ ] NO    PHYSICAL EXAM:  GENERAL: Alert and awake lying in bed in no distress  HEAD:  Atraumatic, Normocephalic  EYES: EOMI, BILLY, conjunctiva and sclera clear  NECK: Supple, No JVD, Normal thyroid  NERVOUS SYSTEM:  Alert & Oriented X3, Motor and sensory systems are intact,   CHEST/LUNG: Bilateral clear breath sounds, no rhochi, no wheezing, no crepitations,  HEART: Regular rate and rhythm; No murmurs, rubs, or gallops  ABDOMEN: Soft, Nontender,  mod distended; Bowel sounds present  EXTREMITIES:   Peripheral Pulses are palpable, no  edema        Care Discussed with Consultants/Other Providers [x ] YES  [ ] NO      Code Status: [] Full Code [] DNR [] DNI [] Goals of Care:   Disposition: [] ICU [] Stroke Unit [] RCU []PCU []Floor [] Discharge Home         FAB Tatum.FACP

## 2022-01-13 LAB
ANION GAP SERPL CALC-SCNC: 9 MMOL/L — SIGNIFICANT CHANGE UP (ref 7–14)
BASOPHILS # BLD AUTO: 0.03 K/UL — SIGNIFICANT CHANGE UP (ref 0–0.2)
BASOPHILS NFR BLD AUTO: 0.1 % — SIGNIFICANT CHANGE UP (ref 0–2)
BUN SERPL-MCNC: 20 MG/DL — SIGNIFICANT CHANGE UP (ref 7–23)
CALCIUM SERPL-MCNC: 8 MG/DL — LOW (ref 8.4–10.5)
CHLORIDE SERPL-SCNC: 108 MMOL/L — HIGH (ref 98–107)
CO2 SERPL-SCNC: 23 MMOL/L — SIGNIFICANT CHANGE UP (ref 22–31)
CREAT SERPL-MCNC: 0.62 MG/DL — SIGNIFICANT CHANGE UP (ref 0.5–1.3)
EOSINOPHIL # BLD AUTO: 0.03 K/UL — SIGNIFICANT CHANGE UP (ref 0–0.5)
EOSINOPHIL NFR BLD AUTO: 0.1 % — SIGNIFICANT CHANGE UP (ref 0–6)
GLUCOSE SERPL-MCNC: 123 MG/DL — HIGH (ref 70–99)
HCT VFR BLD CALC: 39.8 % — SIGNIFICANT CHANGE UP (ref 34.5–45)
HGB BLD-MCNC: 12.6 G/DL — SIGNIFICANT CHANGE UP (ref 11.5–15.5)
IANC: 18.16 K/UL — HIGH (ref 1.5–8.5)
IMM GRANULOCYTES NFR BLD AUTO: 0.8 % — SIGNIFICANT CHANGE UP (ref 0–1.5)
LYMPHOCYTES # BLD AUTO: 1.59 K/UL — SIGNIFICANT CHANGE UP (ref 1–3.3)
LYMPHOCYTES # BLD AUTO: 7.6 % — LOW (ref 13–44)
MAGNESIUM SERPL-MCNC: 2.1 MG/DL — SIGNIFICANT CHANGE UP (ref 1.6–2.6)
MCHC RBC-ENTMCNC: 28.4 PG — SIGNIFICANT CHANGE UP (ref 27–34)
MCHC RBC-ENTMCNC: 31.7 GM/DL — LOW (ref 32–36)
MCV RBC AUTO: 89.8 FL — SIGNIFICANT CHANGE UP (ref 80–100)
MONOCYTES # BLD AUTO: 1.01 K/UL — HIGH (ref 0–0.9)
MONOCYTES NFR BLD AUTO: 4.8 % — SIGNIFICANT CHANGE UP (ref 2–14)
NEUTROPHILS # BLD AUTO: 18.16 K/UL — HIGH (ref 1.8–7.4)
NEUTROPHILS NFR BLD AUTO: 86.6 % — HIGH (ref 43–77)
NRBC # BLD: 0 /100 WBCS — SIGNIFICANT CHANGE UP
NRBC # FLD: 0 K/UL — SIGNIFICANT CHANGE UP
PHOSPHATE SERPL-MCNC: 2.9 MG/DL — SIGNIFICANT CHANGE UP (ref 2.5–4.5)
PLATELET # BLD AUTO: 227 K/UL — SIGNIFICANT CHANGE UP (ref 150–400)
POTASSIUM SERPL-MCNC: 3.8 MMOL/L — SIGNIFICANT CHANGE UP (ref 3.5–5.3)
POTASSIUM SERPL-SCNC: 3.8 MMOL/L — SIGNIFICANT CHANGE UP (ref 3.5–5.3)
RBC # BLD: 4.43 M/UL — SIGNIFICANT CHANGE UP (ref 3.8–5.2)
RBC # FLD: 19.1 % — HIGH (ref 10.3–14.5)
SODIUM SERPL-SCNC: 140 MMOL/L — SIGNIFICANT CHANGE UP (ref 135–145)
WBC # BLD: 20.98 K/UL — HIGH (ref 3.8–10.5)
WBC # FLD AUTO: 20.98 K/UL — HIGH (ref 3.8–10.5)

## 2022-01-13 RX ORDER — MORPHINE SULFATE 50 MG/1
1 CAPSULE, EXTENDED RELEASE ORAL EVERY 6 HOURS
Refills: 0 | Status: DISCONTINUED | OUTPATIENT
Start: 2022-01-13 | End: 2022-01-15

## 2022-01-13 RX ORDER — ACETAMINOPHEN 500 MG
650 TABLET ORAL EVERY 6 HOURS
Refills: 0 | Status: DISCONTINUED | OUTPATIENT
Start: 2022-01-13 | End: 2022-01-15

## 2022-01-13 RX ADMIN — CHLORHEXIDINE GLUCONATE 1 APPLICATION(S): 213 SOLUTION TOPICAL at 16:25

## 2022-01-13 RX ADMIN — Medication 650 MILLIGRAM(S): at 17:03

## 2022-01-13 RX ADMIN — Medication 37.5 MICROGRAM(S): at 22:35

## 2022-01-13 RX ADMIN — MORPHINE SULFATE 1 MILLIGRAM(S): 50 CAPSULE, EXTENDED RELEASE ORAL at 22:57

## 2022-01-13 RX ADMIN — MORPHINE SULFATE 1 MILLIGRAM(S): 50 CAPSULE, EXTENDED RELEASE ORAL at 23:17

## 2022-01-13 RX ADMIN — ENOXAPARIN SODIUM 40 MILLIGRAM(S): 100 INJECTION SUBCUTANEOUS at 12:00

## 2022-01-13 RX ADMIN — Medication 100 MILLIGRAM(S): at 09:34

## 2022-01-13 RX ADMIN — Medication 100 MILLIGRAM(S): at 01:42

## 2022-01-13 RX ADMIN — Medication 100 MILLIGRAM(S): at 17:57

## 2022-01-13 RX ADMIN — PANTOPRAZOLE SODIUM 40 MILLIGRAM(S): 20 TABLET, DELAYED RELEASE ORAL at 12:00

## 2022-01-13 RX ADMIN — Medication 650 MILLIGRAM(S): at 16:26

## 2022-01-13 NOTE — PROGRESS NOTE ADULT - SUBJECTIVE AND OBJECTIVE BOX
INES DOCKERY  61y  Female      Patient is a 61y old  Female who presents with a chief complaint of Abdominal pain, cdiff, metastatic disease, SBO (12 Jan 2022 20:10)  s/p Vent.PEG-doing well.c/o abd.pain on and off,Back pain.no n/v,no cp,no sob,no cough,no diarrhoea    REVIEW OF SYSTEMS:  as above  INTERVAL HPI/OVERNIGHT EVENTS:  T(C): 36.4 (01-13-22 @ 20:52), Max: 36.8 (01-13-22 @ 02:00)  HR: 113 (01-13-22 @ 20:52) (86 - 121)  BP: 115/82 (01-13-22 @ 20:52) (115/82 - 130/94)  RR: 17 (01-13-22 @ 20:52) (17 - 18)  SpO2: 97% (01-13-22 @ 20:52) (97% - 98%)  Wt(kg): --  I&O's Summary    12 Jan 2022 07:01  -  13 Jan 2022 07:00  --------------------------------------------------------  IN: 0 mL / OUT: 0 mL / NET: 0 mL    13 Jan 2022 07:01  -  13 Jan 2022 22:12  --------------------------------------------------------  IN: 750 mL / OUT: 2 mL / NET: 748 mL      T(C): 36.4 (01-13-22 @ 20:52), Max: 36.8 (01-13-22 @ 02:00)  HR: 113 (01-13-22 @ 20:52) (86 - 121)  BP: 115/82 (01-13-22 @ 20:52) (115/82 - 130/94)  RR: 17 (01-13-22 @ 20:52) (17 - 18)  SpO2: 97% (01-13-22 @ 20:52) (97% - 98%)  Wt(kg): --Vital Signs Last 24 Hrs  T(C): 36.4 (13 Jan 2022 20:52), Max: 36.8 (13 Jan 2022 02:00)  T(F): 97.6 (13 Jan 2022 20:52), Max: 98.2 (13 Jan 2022 02:00)  HR: 113 (13 Jan 2022 20:52) (86 - 121)  BP: 115/82 (13 Jan 2022 20:52) (115/82 - 130/94)  BP(mean): --  RR: 17 (13 Jan 2022 20:52) (17 - 18)  SpO2: 97% (13 Jan 2022 20:52) (97% - 98%)    LABS:                        12.6   20.98 )-----------( 227      ( 13 Jan 2022 08:00 )             39.8     01-13    140  |  108<H>  |  20  ----------------------------<  123<H>  3.8   |  23  |  0.62    Ca    8.0<L>      13 Jan 2022 08:00  Phos  2.9     01-13  Mg     2.10     01-13          CAPILLARY BLOOD GLUCOSE                PAST MEDICAL & SURGICAL HISTORY:  HTN (hypertension)    Hypothyroidism    Hyperlipidemia    Malignant neoplasm  left ovarian, s/p chemotherapy, 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 1000 milliLiter(s) (75 mL/Hr) IV Continuous <Continuous>  enoxaparin Injectable 40 milliGRAM(s) SubCutaneous daily  influenza   Vaccine 0.5 milliLiter(s) IntraMuscular once  levothyroxine Injectable 37.5 MICROGram(s) IV Push at bedtime  metroNIDAZOLE  IVPB      metroNIDAZOLE  IVPB 500 milliGRAM(s) IV Intermittent every 8 hours  pantoprazole  Injectable 40 milliGRAM(s) IV Push daily    MEDICATIONS  (PRN):  acetaminophen     Tablet .. 650 milliGRAM(s) Oral every 6 hours PRN Temp greater or equal to 38C (100.4F), Mild Pain (1 - 3)  morphine  - Injectable 1 milliGRAM(s) IV Push every 6 hours PRN Severe Pain (7 - 10)  ondansetron Injectable 4 milliGRAM(s) IV Push every 8 hours PRN Nausea and/or Vomiting        RADIOLOGY & ADDITIONAL TESTS:    Imaging Personally Reviewed:  [ ] YES  [ ] NO    Consultant(s) Notes Reviewed:  [ ] YES  [ ] NO    PHYSICAL EXAM:  GENERAL: Alert and awake lying in bed in no distress  HEAD:  Atraumatic, Normocephalic  EYES: EOMI, BILLY, conjunctiva and sclera clear  NECK: Supple, No JVD, Normal thyroid  NERVOUS SYSTEM:  Alert & Oriented X3, Motor and sensory systems are intact,   CHEST/LUNG: Bilateral clear breath sounds, no rhochi, no wheezing, no crepitations,  HEART: Regular rate and rhythm; No murmurs, rubs, or gallops  ABDOMEN: Soft, Nontender,  mod. distended; Bowel sounds present s/p PEG  EXTREMITIES:   Peripheral Pulses are palpable, no  edema        Care Discussed with Consultants/Other Providers [X ] YES  [ ] NO      Code Status: [] Full Code [] DNR [] DNI [] Goals of Care:   Disposition: [] ICU [] Stroke Unit [] RCU []PCU []Floor [] Discharge Home         CH[facility]atumIn[facility] Health[facility]

## 2022-01-14 ENCOUNTER — TRANSCRIPTION ENCOUNTER (OUTPATIENT)
Age: 62
End: 2022-01-14

## 2022-01-14 LAB
ANION GAP SERPL CALC-SCNC: 12 MMOL/L — SIGNIFICANT CHANGE UP (ref 7–14)
BUN SERPL-MCNC: 19 MG/DL — SIGNIFICANT CHANGE UP (ref 7–23)
CALCIUM SERPL-MCNC: 8.2 MG/DL — LOW (ref 8.4–10.5)
CHLORIDE SERPL-SCNC: 106 MMOL/L — SIGNIFICANT CHANGE UP (ref 98–107)
CO2 SERPL-SCNC: 21 MMOL/L — LOW (ref 22–31)
CREAT SERPL-MCNC: 0.66 MG/DL — SIGNIFICANT CHANGE UP (ref 0.5–1.3)
GLUCOSE SERPL-MCNC: 86 MG/DL — SIGNIFICANT CHANGE UP (ref 70–99)
HCT VFR BLD CALC: 40.5 % — SIGNIFICANT CHANGE UP (ref 34.5–45)
HGB BLD-MCNC: 12.2 G/DL — SIGNIFICANT CHANGE UP (ref 11.5–15.5)
MAGNESIUM SERPL-MCNC: 1.9 MG/DL — SIGNIFICANT CHANGE UP (ref 1.6–2.6)
MCHC RBC-ENTMCNC: 27.8 PG — SIGNIFICANT CHANGE UP (ref 27–34)
MCHC RBC-ENTMCNC: 30.1 GM/DL — LOW (ref 32–36)
MCV RBC AUTO: 92.3 FL — SIGNIFICANT CHANGE UP (ref 80–100)
NRBC # BLD: 0 /100 WBCS — SIGNIFICANT CHANGE UP
NRBC # FLD: 0 K/UL — SIGNIFICANT CHANGE UP
PHOSPHATE SERPL-MCNC: 2.6 MG/DL — SIGNIFICANT CHANGE UP (ref 2.5–4.5)
PLATELET # BLD AUTO: 225 K/UL — SIGNIFICANT CHANGE UP (ref 150–400)
POTASSIUM SERPL-MCNC: 3.9 MMOL/L — SIGNIFICANT CHANGE UP (ref 3.5–5.3)
POTASSIUM SERPL-SCNC: 3.9 MMOL/L — SIGNIFICANT CHANGE UP (ref 3.5–5.3)
RBC # BLD: 4.39 M/UL — SIGNIFICANT CHANGE UP (ref 3.8–5.2)
RBC # FLD: 19.3 % — HIGH (ref 10.3–14.5)
SODIUM SERPL-SCNC: 139 MMOL/L — SIGNIFICANT CHANGE UP (ref 135–145)
WBC # BLD: 20.81 K/UL — HIGH (ref 3.8–10.5)
WBC # FLD AUTO: 20.81 K/UL — HIGH (ref 3.8–10.5)

## 2022-01-14 PROCEDURE — 99232 SBSQ HOSP IP/OBS MODERATE 35: CPT | Mod: GC

## 2022-01-14 RX ADMIN — Medication 100 MILLIGRAM(S): at 17:28

## 2022-01-14 RX ADMIN — PANTOPRAZOLE SODIUM 40 MILLIGRAM(S): 20 TABLET, DELAYED RELEASE ORAL at 12:04

## 2022-01-14 RX ADMIN — SODIUM CHLORIDE 75 MILLILITER(S): 9 INJECTION, SOLUTION INTRAVENOUS at 10:05

## 2022-01-14 RX ADMIN — Medication 100 MILLIGRAM(S): at 10:05

## 2022-01-14 RX ADMIN — ONDANSETRON 4 MILLIGRAM(S): 8 TABLET, FILM COATED ORAL at 18:32

## 2022-01-14 RX ADMIN — CHLORHEXIDINE GLUCONATE 1 APPLICATION(S): 213 SOLUTION TOPICAL at 12:03

## 2022-01-14 RX ADMIN — Medication 100 MILLIGRAM(S): at 01:10

## 2022-01-14 RX ADMIN — Medication 37.5 MICROGRAM(S): at 23:20

## 2022-01-14 RX ADMIN — ENOXAPARIN SODIUM 40 MILLIGRAM(S): 100 INJECTION SUBCUTANEOUS at 12:03

## 2022-01-14 NOTE — DISCHARGE NOTE PROVIDER - CARE PROVIDER_API CALL
[facility],   [phone]  "yellow team"  Phone: (   )    -  Fax: (   )    -  Follow Up Time:

## 2022-01-14 NOTE — CHART NOTE - NSCHARTNOTEFT_GEN_A_CORE
As Recommended by Surgery, GI Consult called for PEG for Venting, Spoke to Pt, she wants to have a discussion with daughter prior to agreeing
GI eval requested for leakage around peg tube site- will f/u recs.
Spoke with Daughter Michele and pt at bedside, daughter requested discussion with surgery and GI again before making decision. Message relayed to consult services
Surgical oncology consult placed. Patient known to Dr. Anna from prior. Will await recommendations.    Demetrius Ordonez PA-C  Medicine ACP, pgr [phone]  Available on Microsoft Teams
Called for consult. Chart reviewed.    Transferred from [place]  Leukocytosis  Epigastric pain/vomiting  CT with bowel obstruction  C diff positive    - Start/continue Vanco 125mg po q 6  - Hold on abx for now, low threshold to start Zosyn if signs sepsis (suspect leukocytosis related to bowel obstruction, and would want to avoid abx in setting C diff)  - Surgery eval regarding bowel obstruction  - Monitor for signs/symptoms megacolon    Full consult in AM  Please call with acute questions or change in status    Chanadn Andino MD  Pager [phone]  From 5pm-9am, and on weekends call [phone]
Family and patient pending discussion about venting peg placement after lengthy discussion about the risks and benefits. If patient and family agreeable, can consider venting peg early next week. GI will continue to follow.
Source: Patient [X]    Family [ ]     other [X] electronic chart, RN     Diet : Minced and Moist, DASH/TLC (cholesterol and sodium restricted)     Nutrition Follow-up Note, consult for assessment. 62 y/o Female who presents with a chief complaint of Abdominal pain, cdiff, metastatic disease, SBO per chart review. Patient w/ venting PEG on 1/11. Patient on Minced and Moist diet at present per MD. As per RN, patient with poor po intake. Hospice care referral noted.      Weight (kg): 73 (01-11 @ 11:19)    Pertinent Medications: dextrose 5% + lactated ringers.  levothyroxine Injectable  pantoprazole  Injectable    Pertinent Labs:  01-14 Na139 mmol/L Glu 86 mg/dL K+ 3.9 mmol/L Cr  0.66 mg/dL BUN 19 mg/dL 01-14 Phos 2.6 mg/dL 01-04 Chol 102 mg/dL LDL --    HDL 38 mg/dL<L> Trig 142 mg/dL    Skin: intact.   No edema noted.     Estimated Needs:   [X ] no change since previous assessment  [ ] recalculated:     Previous Nutrition Diagnosis:     [X]Inadequate Oral Intake     Nutrition Diagnosis is [X] ongoing  [ ] resolved [ ] not applicable     Additional Recommendations:    1. Continue Minced and Moist Diet per MD discretion. Liberalize diet and D/C DASH/TLC (cholesterol and sodium restricted) diet to optimize po intake.     2. Monitor PO intake and PO diet tolerance.     3. Please Encourage po intake, assist with meals and menu selections, provide alternatives PRN.     4. Honor food and beverage preferences within diet restriction of patient in an effort to maximize level of nutrient intake.

## 2022-01-14 NOTE — PROGRESS NOTE ADULT - ASSESSMENT
Impression:  # SBO secondary to cecal mass s/p venting PEG for drainage. Daughter understands this will not relieve patient's underlying malignant obstruction. Draining serous fluid, no additional intervention required. Drain positioned properly.   # Metastatic gallbladder cancer: moving towards comfort measures  # Cdiff: improved on po vanco    Recommendation:  - Supportive care  - Palliative feeds per primary  - No additional intervention indicated from GI at this time  - PEG placed for venting reasons; clamp for about 30 minutes while/after patient eating and then open after to drain for comfort. Okay to subsequently close and open when patient symptomatic (nausea, abdominal discomfort/distension).   - diet/comfort feeds as tolerated    Thank you for involving us in this patient's care. Please reach out if any further questions or concerns. Signing off.    Case discussed with Attending, Dr. Emilee Webb.    Betzaida Malhotra MD  Gastroenterology/Hepatology Fellow, PGY-V    NON-URGENT CONSULTS:  Please email [email or web address]  AT NIGHT AND ON WEEKENDS:  Contact on-call GI fellow via answering service ([phone]) from 5pm-8am and on weekends/holidays  MONDAY-FRIDAY 8AM-5PM:  Pager# [phone] ([facility])  GI Phone# [phone] ([facility])

## 2022-01-14 NOTE — DISCHARGE NOTE PROVIDER - NSDCFUADDINST_GEN_ALL_CORE_FT
PEG placed for venting reasons; clamp for about 30 minutes while/after patient eating and then open after to drain for comfort. Okay to subsequently close and open when patient symptomatic (nausea, abdominal discomfort/distension).

## 2022-01-14 NOTE — DISCHARGE NOTE PROVIDER - HOSPITAL COURSE
62 y/o woman with pmhx of hypothyroid, HTN, HLD, met. ovarian ca (liver, lung) s/p ALYSSA-BSO, debulking of neoplasm, and newly diagnosed gallbladder ca (unclear metastastic) presents as a transfer from OSH for further management of her cdiff as well as potential surgical management of her metastatic disease and SBO    Small bowel obstruction.   high grade SBO at level of cecum due to large cecal mass, transferred from surg at OSH.  post cdiff, on PO vanc  NPO with NGtube placement  surg onc: No Plan for sx as it is futile option, consider palliative G tube   GI- s/p venting peg placed 1/11  per GI: PEG placed for venting reasons; clamp for about 30 minutes while/after patient eating and then open after to drain for comfort. Okay to subsequently close and open when patient symptomatic (nausea, abdominal discomfort/distension).       Clostridium difficile diarrhea--> on IV flagyl, transition to po vanco to complete 2 week course     Gallbladder cancer/Ovarian cancer.   - oncology: no inpt chemo followup with Dr Kyle on discharge  -Hospice eval: home with hospice     On_________, discussed with __________, patient is medically cleared and optimized for discharge today. All medications were reviewed with attending, and sent to mutually agreed upon pharmacy.   62 y/o woman with pmhx of hypothyroid, HTN, HLD, met. ovarian ca (liver, lung) s/p ALYSSA-BSO, debulking of neoplasm, and newly diagnosed gallbladder ca (unclear metastastic) presents as a transfer from OSH for further management of her cdiff as well as potential surgical management of her metastatic disease and SBO    Small bowel obstruction.   high grade SBO at level of cecum due to large cecal mass, transferred from surg at OSH.  post cdiff, on PO vanc  NPO with NGtube placement  surg onc: No Plan for sx as it is futile option, consider palliative G tube   GI- s/p venting peg placed 1/11  per GI: PEG placed for venting reasons; clamp for about 30 minutes while/after patient eating and then open after to drain for comfort. Okay to subsequently close and open when patient symptomatic (nausea, abdominal discomfort/distension).       Clostridium difficile diarrhea--> on IV flagyl, transition to po vanco to complete 2 week course     Gallbladder cancer/Ovarian cancer.   - oncology: no inpt chemo followup with Dr Kyle on discharge  -Hospice eval: home with hospice     On 1/15/22, discussed with Dr. Higginbotham , patient is medically cleared and optimized for discharge today. All medications were reviewed with attending, and sent to mutually agreed upon pharmacy.   60 y/o woman with pmhx of hypothyroid, HTN, HLD, met. ovarian ca (liver, lung) s/p ALYSSA-BSO, debulking of neoplasm, and newly diagnosed gallbladder ca (unclear metastastic) presents as a transfer from OSH for further management of her cdiff as well as potential surgical management of her metastatic disease and SBO    Small bowel obstruction.   high grade SBO at level of cecum due to large cecal mass, transferred from surg at OSH.  post cdiff, on PO vanco  NPO with NGtube placement  surg onc: No Plan for sx as it is futile option, consider palliative G tube   GI- s/p venting peg placed 1/11  per GI: PEG placed for venting reasons; clamp for about 30 minutes while/after patient eating and then open after to drain for comfort. Okay to subsequently close and open when patient symptomatic (nausea, abdominal discomfort/distension).       Clostridium difficile diarrhea--> on IV flagyl, transition to po vanco to complete 2 week course     Gallbladder cancer/Ovarian cancer.   - oncology: no inpt chemo follow-up with Dr Kyle on discharge  -Hospice eval: home with hospice     On 1/15/22, discussed with Dr. Higginbotham , patient is medically cleared and optimized for discharge today. All medications were reviewed with attending, and sent to mutually agreed upon pharmacy.

## 2022-01-14 NOTE — PROGRESS NOTE ADULT - SUBJECTIVE AND OBJECTIVE BOX
INES DOCKERY  61y  Female      Patient is a 61y old  Female who presents with a chief complaint of Abdominal pain, cdiff, metastatic disease, SBO (14 Jan 2022 18:22)  -Concern about lekage-serosanguous ? around G-TUBE.seen by GI.mild abd.discomfort,nausea per Daughter  no sob,no cp,no fever,Diarrhoea improved    REVIEW OF SYSTEMS:    as above    INTERVAL HPI/OVERNIGHT EVENTS:  T(C): 36.8 (01-14-22 @ 14:10), Max: 37.1 (01-14-22 @ 02:53)  HR: 98 (01-14-22 @ 14:10) (98 - 120)  BP: 124/86 (01-14-22 @ 14:10) (124/86 - 133/90)  RR: 18 (01-14-22 @ 14:10) (18 - 18)  SpO2: 98% (01-14-22 @ 14:10) (97% - 98%)  Wt(kg): --  I&O's Summary    13 Jan 2022 07:01  -  14 Jan 2022 07:00  --------------------------------------------------------  IN: 750 mL / OUT: 2 mL / NET: 748 mL    14 Jan 2022 07:01  -  14 Jan 2022 21:47  --------------------------------------------------------  IN: 975 mL / OUT: 5 mL / NET: 970 mL      T(C): 36.8 (01-14-22 @ 14:10), Max: 37.1 (01-14-22 @ 02:53)  HR: 98 (01-14-22 @ 14:10) (98 - 120)  BP: 124/86 (01-14-22 @ 14:10) (124/86 - 133/90)  RR: 18 (01-14-22 @ 14:10) (18 - 18)  SpO2: 98% (01-14-22 @ 14:10) (97% - 98%)  Wt(kg): --Vital Signs Last 24 Hrs  T(C): 36.8 (14 Jan 2022 14:10), Max: 37.1 (14 Jan 2022 02:53)  T(F): 98.2 (14 Jan 2022 14:10), Max: 98.8 (14 Jan 2022 02:53)  HR: 98 (14 Jan 2022 14:10) (98 - 120)  BP: 124/86 (14 Jan 2022 14:10) (124/86 - 133/90)  BP(mean): --  RR: 18 (14 Jan 2022 14:10) (18 - 18)  SpO2: 98% (14 Jan 2022 14:10) (97% - 98%)    LABS:                        12.2   20.81 )-----------( 225      ( 14 Jan 2022 08:11 )             40.5     01-14    139  |  106  |  19  ----------------------------<  86  3.9   |  21<L>  |  0.66    Ca    8.2<L>      14 Jan 2022 08:11  Phos  2.6     01-14  Mg     1.90     01-14          CAPILLARY BLOOD GLUCOSE                PAST MEDICAL & SURGICAL HISTORY:  HTN (hypertension)    Hypothyroidism    Hyperlipidemia    Malignant neoplasm  left ovarian, s/p chemotherapy, 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 1000 milliLiter(s) (75 mL/Hr) IV Continuous <Continuous>  enoxaparin Injectable 40 milliGRAM(s) SubCutaneous daily  influenza   Vaccine 0.5 milliLiter(s) IntraMuscular once  levothyroxine Injectable 37.5 MICROGram(s) IV Push at bedtime  metroNIDAZOLE  IVPB 500 milliGRAM(s) IV Intermittent every 8 hours  metroNIDAZOLE  IVPB      pantoprazole  Injectable 40 milliGRAM(s) IV Push daily    MEDICATIONS  (PRN):  acetaminophen     Tablet .. 650 milliGRAM(s) Oral every 6 hours PRN Temp greater or equal to 38C (100.4F), Mild Pain (1 - 3)  morphine  - Injectable 1 milliGRAM(s) IV Push every 6 hours PRN Severe Pain (7 - 10)  ondansetron Injectable 4 milliGRAM(s) IV Push every 8 hours PRN Nausea and/or Vomiting        RADIOLOGY & ADDITIONAL TESTS:    Imaging Personally Reviewed:  [ ] YES  [ ] NO    Consultant(s) Notes Reviewed:  [ ] YES  [ ] NO    PHYSICAL EXAM:  GENERAL: Alert and awake lying in bed in no distress  HEAD:  Atraumatic, Normocephalic  EYES: EOMI, BILLY, conjunctiva and sclera clear  NECK: Supple, No JVD, Normal thyroid  NERVOUS SYSTEM:  Alert & Oriented X3, Motor and sensory systems are intact,   CHEST/LUNG: Bilateral clear breath sounds, no rhochi, no wheezing, no crepitations,  HEART: Regular rate and rhythm; No murmurs, rubs, or gallops  ABDOMEN: Soft, Nontender,  mod. distended; Bowel sounds present.s/p G-tube  EXTREMITIES:   Peripheral Pulses are palpable, no  edema        Care Discussed with Consultants/Other Providers [x ] YES  [ ] NO      Code Status: [] Full Code [] DNR [] DNI [] Goals of Care:   Disposition: [] ICU [] Stroke Unit [] RCU []PCU []Floor [] Discharge Home         [facility]

## 2022-01-14 NOTE — HOSPICE CARE NOTE - CONVESATION DETAILS
Hospice nurse collaborated with  and spoke with patient's daughter via tel call. Hospice care and services explained. Explained the need for consent for hospice care. Good understanding was expressed. Pt's daughter wishes  to visit with patient at [facility] to learn venting PEG tube care. Hospice RN will follow up with [facility]  and pt's family for a safe transition of care. 
Hospice RN assessed pt at bedside and met with pt's daughter. Agreement with  a home hospice plan of care was discussed. Pt and daughter are in agreement.

## 2022-01-14 NOTE — HOSPICE CARE NOTE - DME DETAILS
Order placed with [facility] for delivery this evening: hospital bed, gwnedolyn, o2 concentrator, wheelchair, walker - for delivery this evening. 
TBD

## 2022-01-14 NOTE — DISCHARGE NOTE PROVIDER - PROVIDER TOKENS
FREE:[LAST:[[facility]],PHONE:[(   )    -],FAX:[(   )    -],ADDRESS:[[phone]  "[facility]"]]

## 2022-01-14 NOTE — CHART NOTE - NSCHARTNOTESELECT_GEN_ALL_CORE
GI Update/Event Note
ID
Nutrition Follow-up Note-Registered Dietitian/Nutrition Services
Event Note

## 2022-01-14 NOTE — PROGRESS NOTE ADULT - ATTENDING COMMENTS
As above  s/p venting PEG  serosanguinous fluid around stoma  -keep area dry  - Bacitracin ointment daily
No significant change.   Will reach out to family again tomorrow to discuss PEG placement as they had remained undecided.   Can tentatively plan for PEG tomorrow if patient/family amenable and endoscopy schedule allows.   Repeat COVID swab needed prior to endoscopy.
No significant change.   discussed with family - re: venting PEG, benefits include venting//decompression as pt with bowel obstruction, emphasized that PEG would be for palliation of symptoms of n/v/distension and is NOT curative  risks including bleeding/trauma/infection/perforation or injury to adjacent organs again discussed with daughter  They would like to defer decision making at this time  will place tentatively on schedule again tomorrow      Emilee Webb MD   of Medicine, Gastroenterology  [address] [place] [address], [address]  [place] [address]  [phone]
No significant change.   discussed with family - re: venting PEG, benefits include venting//decompression as pt with bowel obstruction, emphasized that PEG would be for palliation of symptoms of n/v/distension and is NOT curative  risks including bleeding/trauma/infection/perforation or injury to adjacent organs again discussed with daughter  s/p PEG yesterday for venting      Emilee Webb MD   of Medicine, Gastroenterology  [address] [place] [address], [address]  [place] [address]  [phone]

## 2022-01-14 NOTE — DISCHARGE NOTE PROVIDER - NSDCMRMEDTOKEN_GEN_ALL_CORE_FT
atorvastatin 10 mg oral tablet: 1 tab(s) orally once a day  levothyroxine 50 mcg (0.05 mg) oral tablet: 1 tab(s) orally once a day   acetaminophen 325 mg oral tablet: 2 tab(s) orally every 6 hours, As needed, Temp greater or equal to 38C (100.4F), Mild Pain (1 - 3)  levothyroxine 50 mcg (0.05 mg) oral tablet: 1 tab(s) orally once a day   acetaminophen 325 mg oral tablet: 2 tab(s) orally every 6 hours, As needed, Temp greater or equal to 38C (100.4F), Mild Pain (1 - 3)  Dilaudid 2 mg oral tablet: 1 tab(s) orally every 6 hours, As Needed -for severe pain MDD:4 tabs   levothyroxine 50 mcg (0.05 mg) oral tablet: 1 tab(s) orally once a day  ondansetron 4 mg oral tablet, disintegratin tab(s) orally every 8 hours, As Needed   Protonix 40 mg oral delayed release tablet: 1 tab(s) orally once a day   vancomycin 125 mg oral capsule: 1 cap(s) orally every 6 hours    acetaminophen 325 mg oral tablet: 2 tab(s) orally every 6 hours, As needed, Temp greater or equal to 38C (100.4F), Mild Pain (1 - 3)  Dilaudid 1 mg/mL oral liquid: 2 milliliter(s) orally every 6 hours, As Needed  for severe pain MDD:8 mg/mL   levothyroxine 50 mcg (0.05 mg) oral tablet: 1 tab(s) orally once a day  ondansetron 4 mg oral tablet, disintegratin tab(s) orally every 8 hours, As Needed   Protonix 40 mg oral delayed release tablet: 1 tab(s) orally once a day   vancomycin 125 mg oral capsule: 1 cap(s) orally every 6 hours MDD:4 cap

## 2022-01-14 NOTE — DISCHARGE NOTE PROVIDER - NSDCCPCAREPLAN_GEN_ALL_CORE_FT
PRINCIPAL DISCHARGE DIAGNOSIS  Diagnosis: Nausea & vomiting  Assessment and Plan of Treatment: You were found to have a Small bowel obstrution secondary to cecal mass s/p venting PEG placed for drainage      SECONDARY DISCHARGE DIAGNOSES  Diagnosis: Metastatic disease  Assessment and Plan of Treatment: You are being discharged home with Hospice services and it is recommended to focus on comfort measures and supportive care. Continue with medications prescribed for pain and other symptom control. If you have questions or concerns you may contact the Hospice service line by calling [phone].    Diagnosis: Clostridium difficile diarrhea  Assessment and Plan of Treatment: Continue Vancomycin until 1/18/22     PRINCIPAL DISCHARGE DIAGNOSIS  Diagnosis: Nausea & vomiting  Assessment and Plan of Treatment: You were found to have a Small bowel obstrution secondary to cecal mass s/p venting PEG placed for drainage.  Bacitracin ointment daily around stoma area.      SECONDARY DISCHARGE DIAGNOSES  Diagnosis: Metastatic disease  Assessment and Plan of Treatment: You are being discharged home with Hospice services and it is recommended to focus on comfort measures and supportive care. Continue with medications prescribed for pain and other symptom control. If you have questions or concerns you may contact the Hospice service line by calling [phone].    Diagnosis: Clostridium difficile diarrhea  Assessment and Plan of Treatment: Continue Vancomycin until 1/18/22     PRINCIPAL DISCHARGE DIAGNOSIS  Diagnosis: Nausea & vomiting  Assessment and Plan of Treatment: You were found to have a Small bowel obstrution secondary to cecal mass s/p venting PEG placed for drainage.  Bacitracin ointment daily around stoma area.      SECONDARY DISCHARGE DIAGNOSES  Diagnosis: Metastatic disease  Assessment and Plan of Treatment: You are being discharged home with Hospice services and it is recommended to focus on comfort measures and supportive care. Continue with medications prescribed for pain and other symptom control. If you have questions or concerns you may contact the Hospice service line by calling [phone]    Diagnosis: Clostridium difficile diarrhea  Assessment and Plan of Treatment: Continue Vancomycin until 1/18/22

## 2022-01-14 NOTE — PROGRESS NOTE ADULT - SUBJECTIVE AND OBJECTIVE BOX
Chief Complaint:  Patient is a 61y old  Female who presents with a chief complaint of Abdominal pain, cdiff, metastatic disease, SBO (13 Jan 2022 22:11)        Interval Events:   Called by primary for concerns for serous fluid leak around peg site  Per nurse, not clear how frequently peg being vented or if being vented properly    Hospital Medications:  acetaminophen     Tablet .. 650 milliGRAM(s) Oral every 6 hours PRN  chlorhexidine 2% Cloths 1 Application(s) Topical daily  dextrose 5% + lactated ringers. 1000 milliLiter(s) IV Continuous <Continuous>  enoxaparin Injectable 40 milliGRAM(s) SubCutaneous daily  influenza   Vaccine 0.5 milliLiter(s) IntraMuscular once  levothyroxine Injectable 37.5 MICROGram(s) IV Push at bedtime  metroNIDAZOLE  IVPB 500 milliGRAM(s) IV Intermittent every 8 hours  metroNIDAZOLE  IVPB      morphine  - Injectable 1 milliGRAM(s) IV Push every 6 hours PRN  ondansetron Injectable 4 milliGRAM(s) IV Push every 8 hours PRN  pantoprazole  Injectable 40 milliGRAM(s) IV Push daily      ROS:   Complete and normal except as mentioned above.    PHYSICAL EXAM:   Vital Signs:  Vital Signs Last 24 Hrs  T(C): 37.1 (14 Jan 2022 02:53), Max: 37.1 (14 Jan 2022 02:53)  T(F): 98.8 (14 Jan 2022 02:53), Max: 98.8 (14 Jan 2022 02:53)  HR: 120 (14 Jan 2022 02:30) (113 - 120)  BP: 133/90 (14 Jan 2022 02:30) (115/82 - 133/90)  BP(mean): --  RR: 18 (14 Jan 2022 02:30) (17 - 18)  SpO2: 97% (14 Jan 2022 02:30) (97% - 97%)  Daily     Daily     GENERAL:  appears comfortable, no acute distress  HEENT:  NC/AT,  conjunctivae clear, sclera -anicteric, NGT noted  CHEST:  no increased effort  HEART:  Regular rate and rhythm  ABDOMEN:  Soft, non-tender, non-distended; peg site c/d/i    EXTREMITIES:  no cyanosis, clubbing or edema  SKIN:  No rash/erythema/ecchymoses/petechiae/wounds  NEURO:  Alert, oriented    LABS: reviewed                        12.2   20.81 )-----------( 225      ( 14 Jan 2022 08:11 )             40.5     01-14    139  |  106  |  19  ----------------------------<  86  3.9   |  21<L>  |  0.66    Ca    8.2<L>      14 Jan 2022 08:11  Phos  2.6     01-14  Mg     1.90     01-14          Interval Diagnostic Studies: see sunrise for full report

## 2022-01-15 ENCOUNTER — TRANSCRIPTION ENCOUNTER (OUTPATIENT)
Age: 62
End: 2022-01-15

## 2022-01-15 VITALS
SYSTOLIC BLOOD PRESSURE: 121 MMHG | RESPIRATION RATE: 18 BRPM | OXYGEN SATURATION: 98 % | TEMPERATURE: 98 F | DIASTOLIC BLOOD PRESSURE: 82 MMHG | HEART RATE: 112 BPM

## 2022-01-15 PROCEDURE — 93010 ELECTROCARDIOGRAM REPORT: CPT | Mod: 76

## 2022-01-15 RX ORDER — HYDROMORPHONE HYDROCHLORIDE 2 MG/ML
2 INJECTION INTRAMUSCULAR; INTRAVENOUS; SUBCUTANEOUS
Qty: 56 | Refills: 0
Start: 2022-01-15 | End: 2022-01-21

## 2022-01-15 RX ORDER — VANCOMYCIN HCL 1 G
1 VIAL (EA) INTRAVENOUS
Qty: 16 | Refills: 0
Start: 2022-01-15 | End: 2022-01-18

## 2022-01-15 RX ORDER — ONDANSETRON 8 MG/1
1 TABLET, FILM COATED ORAL
Qty: 21 | Refills: 0
Start: 2022-01-15 | End: 2022-01-21

## 2022-01-15 RX ORDER — PANTOPRAZOLE SODIUM 20 MG/1
1 TABLET, DELAYED RELEASE ORAL
Qty: 30 | Refills: 0
Start: 2022-01-15 | End: 2022-02-13

## 2022-01-15 RX ORDER — ATORVASTATIN CALCIUM 80 MG/1
1 TABLET, FILM COATED ORAL
Qty: 0 | Refills: 0 | DISCHARGE

## 2022-01-15 RX ORDER — ACETAMINOPHEN 500 MG
2 TABLET ORAL
Qty: 0 | Refills: 0 | DISCHARGE
Start: 2022-01-15

## 2022-01-15 RX ORDER — HYDROMORPHONE HYDROCHLORIDE 2 MG/ML
1 INJECTION INTRAMUSCULAR; INTRAVENOUS; SUBCUTANEOUS
Qty: 28 | Refills: 0
Start: 2022-01-15 | End: 2022-01-21

## 2022-01-15 RX ADMIN — PANTOPRAZOLE SODIUM 40 MILLIGRAM(S): 20 TABLET, DELAYED RELEASE ORAL at 09:44

## 2022-01-15 RX ADMIN — Medication 100 MILLIGRAM(S): at 09:43

## 2022-01-15 RX ADMIN — CHLORHEXIDINE GLUCONATE 1 APPLICATION(S): 213 SOLUTION TOPICAL at 09:46

## 2022-01-15 RX ADMIN — Medication 100 MILLIGRAM(S): at 17:14

## 2022-01-15 RX ADMIN — Medication 100 MILLIGRAM(S): at 02:03

## 2022-01-15 RX ADMIN — ENOXAPARIN SODIUM 40 MILLIGRAM(S): 100 INJECTION SUBCUTANEOUS at 09:44

## 2022-01-15 NOTE — PROGRESS NOTE ADULT - PROBLEM SELECTOR PLAN 1
pt w/ high grade SBO at level of cecum due to large cecal mass. Seen by surgery at OSH, transferred here for potential intervention  , -NGT,NPO  -IVF.SEEN BY SURGERY,Not a canditate for surgery  S/P PEG-doing well-GI F/u -NOTED.Iplan per GI,PEG care
pt w/ high grade SBO at level of cecum due to large cecal mass. Seen by surgery at OSH, transferred here for potential intervention  , -NGT,NPO  -IVF.SEEN BY SURGERY,Not a canditate for surgery  S/P PEG-doing well-GI F/u -NOTED.Iplan per GI,PEG care.PEG for venting,pleasure feeds,meds?  -for Hospice at Home
pt w/ high grade SBO at level of cecum due to large cecal mass. Seen by surgery at OSH, transferred here for potential intervention  , -NGT,NPO  -IVF.SEEN BY SURGERY,Not a canditate for surgery  S/P PEG-doing well-GI F/u -NOTED.Iplan per GI,PEG care.PEG for venting,pleasure feeds,meds?
pt w/ high grade SBO at level of cecum due to large cecal mass. Seen by surgery at OSH, transferred here for potential intervention  -likely cause of white count to 13, pt afebrile and vitals stable  , -NGT,NPO  -IVF.SEEN BY SURGERY,Not a canditate for surgery  -Leucocytosis-Trend
pt w/ high grade SBO at level of cecum due to large cecal mass. Seen by surgery at OSH, transferred here for potential intervention  , -NGT,NPO  -IVF.SEEN BY SURGERY,Not a canditate for surgery  -Leucocytosis-Trend  -Surgery f/u noted.For possible vent PEG.Daughter and Patient agreed.
pt w/ high grade SBO at level of cecum due to large cecal mass. Seen by surgery at OSH, transferred here for potential intervention  , -NGT,NPO  -IVF.SEEN BY SURGERY,Not a canditate for surgery  -Leucocytosis-Trend  -Surgery f/u noted.For possible vent PEG.dAUGHTER agreed.
pt w/ high grade SBO at level of cecum due to large cecal mass. Seen by surgery at OSH, transferred here for potential intervention  , -NGT,NPO  -IVF.SEEN BY SURGERY,Not a canditate for surgery  -Leucocytosis-Trend  -Surgery f/u noted.For possible vent PEG.Daughter and Patient agreed.
pt w/ high grade SBO at level of cecum due to large cecal mass. Seen by surgery at OSH, transferred here for potential intervention  , -NGT,NPO  -IVF.SEEN BY SURGERY,Not a canditate for surgery  -Surgery f/u noted.For possible vent PEG.Daughter deferred decision..  -GI F/u -NOTED.For possible PEG tomorrow.,explained pros and cons by GI
pt w/ high grade SBO at level of cecum due to large cecal mass. Seen by surgery at OSH, transferred here for potential intervention  , -NGT,NPO  -IVF.SEEN BY SURGERY,Not a canditate for surgery  S/P PEG-doing well-GI F/u -NOTED.Iplan per GI,PEG care.PEG for venting,pleasure feeds,meds?  -for Hospice at Home
pt w/ high grade SBO at level of cecum due to large cecal mass. Seen by surgery at OSH, transferred here for potential intervention  -likely cause of white count to 13, pt afebrile and vitals stable  , -NGT,NPO  -IVF.SEEN BY SURGERY,Not a canditate for surgery  -Leucocytosis-Trend
pt w/ high grade SBO at level of cecum due to large cecal mass. Seen by surgery at OSH, transferred here for potential intervention  , -NGT,NPO  -IVF.SEEN BY SURGERY,Not a canditate for surgery  S/P PEG-doing well-GI F/u -NOTED.Iplan per GI,PEG care.PEG for venting,pleasure feeds,meds?
pt w/ high grade SBO at level of cecum due to large cecal mass. Seen by surgery at OSH, transferred here for potential intervention  , -NGT,NPO  -IVF.SEEN BY SURGERY,Not a canditate for surgery  -Surgery f/u noted.For possible vent PEG.Daughter and Patient agreed.  -GI F/u -NOTED.For possible PEG tomorrow.
- Continue IV Morphine 2mg q4-6 PRN moderate-severe pain

## 2022-01-15 NOTE — PROGRESS NOTE ADULT - PROBLEM SELECTOR PROBLEM 5
Hypertension
Clostridium difficile diarrhea

## 2022-01-15 NOTE — PROGRESS NOTE ADULT - PROBLEM SELECTOR PLAN 5
-controlled.monitor
-controlled.monitor
- on IV Flagyl  - Infectious disease recommendations appreciated  - management as per primary team.
-controlled.monitor
Yes

## 2022-01-15 NOTE — PROGRESS NOTE ADULT - PROBLEM SELECTOR PLAN 2
- diarrhoea-improving.Leucocytosis-trend  Unable to tolerate PO.D/C VANCO PL.STARTED IV FLAGYL--\COMPLETEDt..ID f/u NOTED
- diarrhoea-improving.Leucocytosis-trend  Unable to tolerate PO.D/C VANCO PL.STARTED IV FLAGYL-cont..ID f/u
- diarrhoea-improving  Unable to tolerate PO.D/C VANCO PL.STARTED IV FLAGYL.ID Consult appreciated
- due to SBO   - no further episodes at this time since NG in place to suction  - IV Zofran 4mg PRN if QTC wnl and nausea/emesis recur.
- diarrhoea-improving  Unable to tolerate PO.D/C VANCO PL.STARTED IV FLAGYL.ID f/u
- diarrhoea-improving.Leucocytosis-trend  Unable to tolerate PO.D/C VANCO PL.STARTED IV FLAGYL-cont..ID f/u
- diarrhoea-improving.Leucocytosis-trend  Unable to tolerate PO.D/C VANCO PL.STARTED IV FLAGYL-cont..ID f/u
-c/o diarrhoea  Unable to tolerate PO.D/C VANCO PL.STARTED IV FLAGYL.ID Consult appreciated
- diarrhoea-improving.Leucocytosis-trend  Unable to tolerate PO.D/C VANCO PL.STARTED IV FLAGYL-cont..ID f/u
- diarrhoea-improving  Unable to tolerate PO.D/C VANCO PL.STARTED IV FLAGYL.ID f/u
- diarrhoea-improving.Leucocytosis-trend  Unable to tolerate PO.D/C VANCO PL.STARTED IV FLAGYL-cont..ID f/u

## 2022-01-15 NOTE — PROGRESS NOTE ADULT - PROBLEM SELECTOR PROBLEM 6
Gallbladder cancer
Need for prophylactic measure

## 2022-01-15 NOTE — PROVIDER CONTACT NOTE (OTHER) - ASSESSMENT
/90, , Oral temp 97
Pt HR is still tachy at 114 bpm. Rest of her vital signs are stable, pt is afebrile.

## 2022-01-15 NOTE — PROGRESS NOTE ADULT - PROBLEM SELECTOR PROBLEM 1
Small bowel obstruction
Abdominal pain
Small bowel obstruction
Small bowel obstruction

## 2022-01-15 NOTE — PROGRESS NOTE ADULT - PROBLEM SELECTOR PLAN 4
- CT Abd/ Pelvis significant for High-grade small bowel obstruction at the level of the cecum   - NG to suction in place  - Surgery recommendations appreciated: possible venting PEG placement if family in agreement   - management as per primary team.
check tsh, continue iv levothyroxine as from OSH

## 2022-01-15 NOTE — PROGRESS NOTE ADULT - PROBLEM SELECTOR PLAN 6
- Patient with metastatic gallbladder adenocarcinoma   - Patient currently on active treatment gemcitabine + oxaliplatin. Last treatment December 13th.  - Per family's discussion with oncology, no further DMT recommended at this time.   Family interested in Hospice referral upon discharge once medically stable.
lovenox subcu for dvt ppx, NPO

## 2022-01-15 NOTE — DISCHARGE NOTE NURSING/CASE MANAGEMENT/SOCIAL WORK - NSDCPEFALRISK_GEN_ALL_CORE
For information on Fall & Injury Prevention, visit: [email or web address] OR  [email or web address] OR  https://www.cdc.gov/steadi/patient.html

## 2022-01-15 NOTE — PROVIDER CONTACT NOTE (OTHER) - BACKGROUND
Pt here for n/v and abdominal pain. Has new diagnosis of gallblader CA and pmh hypothyroid, HTN, and HLD. Cdiff presenting as well.
Pt. is positive for C. diff.

## 2022-01-15 NOTE — PROGRESS NOTE ADULT - REASON FOR ADMISSION
Abdominal pain, cdiff, metastatic disease, SBO

## 2022-01-15 NOTE — PROGRESS NOTE ADULT - PROVIDER SPECIALTY LIST ADULT
Infectious Disease
Internal Medicine
Gastroenterology
Gastroenterology
Heme/Onc
Heme/Onc
Infectious Disease
Internal Medicine
Surgery
Gastroenterology
Gastroenterology
Heme/Onc
Surgery
Surgery
Palliative Care
Internal Medicine

## 2022-01-15 NOTE — DISCHARGE NOTE NURSING/CASE MANAGEMENT/SOCIAL WORK - PATIENT PORTAL LINK FT
You can access the FollowMyHealth Patient Portal offered by [facility] by registering at the following website: [email or web address]. By joining [facility]’s FollowMyHealth portal, you will also be able to view your health information using other applications (apps) compatible with our system.

## 2022-01-15 NOTE — PROGRESS NOTE ADULT - PROBLEM SELECTOR PLAN 3
as above, on chemo. here for potential surgical evaluation. surg onc consulted by ER, will f/u recs  -consult onc i
as above, on chemo. here for potential surgical evaluation. surg onc consulted by ER, will f/u recs  -consult onc pending
as above, on chemo. here for potential surgical evaluation. surg onc consulted by ERleni f/u recs  --Oncology f/u noted
as above, on chemo. here for potential surgical evaluation. surg onc consulted by ERleni f/u recs  --Oncology f/u noted
as above, on chemo. here for potential surgical evaluation. surg onc consulted by ER, will f/u recs  -consult onc pending.Called-
as above, on chemo. here for potential surgical evaluation. surg onc consulted by ERleni f/u recs  --Oncology f/u noted
Patient requires support with ADLs. Please assist with ADLs PRN.  Skin care as per protocol
as above, on chemo. here for potential surgical evaluation. surg onc consulted by ER, will f/u recs  -consult onc pending.Called-
as above, on chemo. here for potential surgical evaluation. surg onc consulted by ERleni f/u recs  --Oncology f/u noted
as above, on chemo. here for potential surgical evaluation. surg onc consulted by ERleni f/u recs  --Oncology f/u noted

## 2022-01-15 NOTE — PROGRESS NOTE ADULT - SUBJECTIVE AND OBJECTIVE BOX
INES DOCKERY  61y  Female      Patient is a 61y old  Female who presents with a chief complaint of Abdominal pain, c. diff, metastatic disease, SBO (14 Jan 2022 18:22)  comfortable,no new c/o.Eating pleasure feeds.s/p VENT PEG  no sob,no cp,no fever,no cough.Diarrhoea improved    REVIEW OF SYSTEMS:  as above    INTERVAL HPI/OVERNIGHT EVENTS:  T(C): 36.6 (01-15-22 @ 11:05), Max: 37 (01-14-22 @ 22:14)  HR: 112 (01-15-22 @ 11:05) (98 - 125)  BP: 121/82 (01-15-22 @ 11:05) (117/85 - 124/87)  RR: 18 (01-15-22 @ 11:05) (17 - 18)  SpO2: 98% (01-15-22 @ 11:05) (97% - 98%)  Wt(kg): --  I&O's Summary    14 Jan 2022 07:01  -  15 Cristian 2022 07:00  --------------------------------------------------------  IN: 975 mL / OUT: 5 mL / NET: 970 mL      T(C): 36.6 (01-15-22 @ 11:05), Max: 37 (01-14-22 @ 22:14)  HR: 112 (01-15-22 @ 11:05) (98 - 125)  BP: 121/82 (01-15-22 @ 11:05) (117/85 - 124/87)  RR: 18 (01-15-22 @ 11:05) (17 - 18)  SpO2: 98% (01-15-22 @ 11:05) (97% - 98%)  Wt(kg): --Vital Signs Last 24 Hrs  T(C): 36.6 (15 Cristian 2022 11:05), Max: 37 (14 Jan 2022 22:14)  T(F): 97.8 (15 Cristian 2022 11:05), Max: 98.6 (14 Jan 2022 22:14)  HR: 112 (15 Cristian 2022 11:05) (98 - 125)  BP: 121/82 (15 Cristian 2022 11:05) (117/85 - 124/87)  BP(mean): --  RR: 18 (15 Cristian 2022 11:05) (17 - 18)  SpO2: 98% (15 Cristian 2022 11:05) (97% - 98%)    LABS:                        12.2   20.81 )-----------( 225      ( 14 Jan 2022 08:11 )             40.5     01-14    139  |  106  |  19  ----------------------------<  86  3.9   |  21<L>  |  0.66    Ca    8.2<L>      14 Jan 2022 08:11  Phos  2.6     01-14  Mg     1.90     01-14          CAPILLARY BLOOD GLUCOSE                PAST MEDICAL & SURGICAL HISTORY:  HTN (hypertension)    Hypothyroidism    Hyperlipidemia    Malignant neoplasm  left ovarian, s/p chemotherapy, 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 650 milliGRAM(s) Oral every 6 hours PRN Temp greater or equal to 38C (100.4F), Mild Pain (1 - 3)  morphine  - Injectable 1 milliGRAM(s) IV Push every 6 hours PRN Severe Pain (7 - 10)  ondansetron Injectable 4 milliGRAM(s) IV Push every 8 hours PRN Nausea and/or Vomiting        RADIOLOGY & ADDITIONAL TESTS:    Imaging Personally Reviewed:  [ ] YES  [ ] NO    Consultant(s) Notes Reviewed:  [ x] YES  [ ] NO    PHYSICAL EXAM:  GENERAL: Alert and awake lying in bed in no distress  HEAD:  Atraumatic, Normocephalic  EYES: EOMI, BILLY, conjunctiva and sclera clear  NECK: Supple, No JVD, Normal thyroid  NERVOUS SYSTEM:  Alert & Oriented X3, Motor and sensory systems are intact,   CHEST/LUNG: Bilateral clear breath sounds, no rhochi, no wheezing, no crepitations,  HEART: Regular rate and rhythm; No murmurs, rubs, or gallops  ABDOMEN: Soft, Nontender,  mod distended; Bowel sounds present.s/p PEG  EXTREMITIES:   Peripheral Pulses are palpable, no  edema        Care Discussed with Consultants/Other Providers [X ] YES  [ ] NO      Code Status: [] Full Code [] DNR [] DNI [] Goals of Care:   Disposition: [] ICU [] Stroke Unit [] RCU []PCU []Floor [] Discharge Home         FAB T[place].FACP

## 2022-01-15 NOTE — PROGRESS NOTE ADULT - PROBLEM SELECTOR PROBLEM 3
Gallbladder cancer
Debility
Gallbladder cancer

## 2022-01-15 NOTE — PROGRESS NOTE ADULT - TIME BILLING
d/w ,staff
-mild hyponatremia-improved  -Palliative consult appreciated RE GOC-f/u ,  -Spoke with Daughter.Agreed with plan.Answered all questions  -d/w PA
-mild hyponatremia-improved  -mild hypokaemia-supplement  --Medically optimized for PEG  -Palliative  f/u noted,  -Spoke with Daughter.Agreed with plan.Answered all questions  -d/w PA,Daughter on phone  D/W RN
-mild hyponatremia-improved  -mild hypokaemia-supplement  -Palliative  f/u noted,  -D/W RN
-mild hyponatremia-improved  -Palliative consult f/u noted,  -Spoke with Daughter.Agreed with plan.Answered all questions  -d/w PA,Daughter on phone
-mild hyponatremia-improved  -mild hypokaemia-supplement  --Medically optimized for PEG  -Palliative  f/u noted,  -D/W RN
-mild hyponatremia-improved  -Palliative consult f/u noted,  -Spoke with Daughter.Agreed with plan.Answered all questions  -d/w PA,Daughter on phone
-mild hyponatremia-improved  -mild hypokaemia-supplement  -Palliative  f/u noted,.Pain management as ordered.  d/w Daughter on phone...Answered all questions  -D/W RN,,NP  --d/c planning.Home Hospice.D/C today
-mild hyponatremia-improved  -mild hypokaemia-supplement  -Palliative  f/u noted,.Pain management as ordered.  d/w Daughter at bedside.Answered all questions  -D/W RN
-mild hyponatremia-improved  -mild hypokaemia-supplement  -Palliative  f/u noted,  -D/W RN
-mild hyponatremia-improved  -mild hypokaemia-supplement  -Palliative  f/u noted,.Pain management as ordered.  d/w Daughter on phone...Answered all questions  -D/W RN,PA  --d/c planning.Home Hospice
-mild hyponatremia-monitor  -Palliative consult appreciated RE GOC-f/u ,oncology f/u
time spent counseling family and time spent coordinating care with primary team

## 2022-01-15 NOTE — PROGRESS NOTE ADULT - PROBLEM SELECTOR PROBLEM 2
Clostridium difficile diarrhea
Nausea and/or vomiting
Clostridium difficile diarrhea

## 2022-01-15 NOTE — PROGRESS NOTE ADULT - PROBLEM SELECTOR PROBLEM 4
Hypothyroid
Small bowel obstruction

## 2022-02-08 NOTE — ASU PREOP CHECKLIST - COMMENTS
Anesthesia Pre Eval Note    Anes Review of Systems    Relevant Problems   No relevant active problems       Physical Exam     Airway   Mallampati: II  TM Distance: >3 FB  Neck ROM: Full  Neck: Non-tender  TMJ Mobility: Good    Cardiovascular    Cardio Rhythm: Regular  Cardio Rate: Normal    Head Assessment  Head assessment: Normocephalic and Atraumatic    General Assessment  General Assessment: Alert and oriented and No acute distress    Dental Exam    Patient has:  Upper dentures and Lower dentures    Pulmonary Exam  Pulmonary exam normal    Abdominal Exam  Abdominal exam normal      Anesthesia Plan:    ASA Status: 2  Anesthesia Type: General    Induction: Intravenous  Preferred Airway Type: ETT  Maintenance: Combined  Premedication: IV      Post-op Pain Management: Per Surgeon      Checklist  Reviewed: Lab Results, EKG, Patient Summary, NPO Status, Past Med History, Allergies, Medications and Problem list  Consent/Risks Discussed Statement:  The proposed anesthetic plan, including its risks and benefits, have been discussed with the Patient along with the risks and benefits of alternatives. Questions were encouraged and answered and the patient and/or representative understands and agrees to proceed.        I discussed with the patient (and/or patient's legal representative) the risks and benefits of the proposed anesthesia plan, General, which may include services performed by other anesthesia providers.    Alternative anesthesia plans, if available, were reviewed with the patient (and/or patient's legal representative). Discussion has been held with the patient (and/or patient's legal representative) regarding risks of anesthesia, which include emergent situations that may require change in anesthesia plan.  The patient (and/or patient's legal representative) has indicated understanding, his/her questions have been answered, and he/she wishes to proceed with the planned anesthetic.      Informed Consent for  Blood: Consented  Blood Products: Not Anticipated    Comments  Plan Comments: R/B of general anesthesia discussed with patient including but not limited to cardiac complications, respiratory complications, CNS complications, nausea and vomiting, sore throat, and dental injury. Questions answered and patient wishes to proceed.     took Pepcid 20mg and levothyroxine 50mcg @5am.

## 2022-04-29 NOTE — PATIENT PROFILE ADULT - NSPROMEDSADMININFO_GEN_A_NUR
Symptomatic Alert TM letter sent on 4/25/22.    No Covid test results found in Epic, shared centralized  email or SafeCheck.   Day 5 from symptom onset is  4/30/22.     F/U 4/29/22  for results    
no concerns

## 2022-07-13 NOTE — ED ADULT NURSE NOTE - NSFALLRSKOUTCOME_ED_ALL_ED
Universal Safety Interventions Olumiant Pregnancy And Lactation Text: Based on animal studies, Olumiant may cause embryo-fetal harm when administered to pregnant women.  The medication should not be used in pregnancy.  Breastfeeding is not recommended during treatment.

## 2022-12-21 NOTE — H&P PST ADULT - PRO INTERPRETER NEED 2
Other Double Island Pedicle Flap Text: The defect edges were debeveled with a #15 scalpel blade.  Given the location of the defect, shape of the defect and the proximity to free margins a double island pedicle advancement flap was deemed most appropriate.  Using a sterile surgical marker, an appropriate advancement flap was drawn incorporating the defect, outlining the appropriate donor tissue and placing the expected incisions within the relaxed skin tension lines where possible.    The area thus outlined was incised deep to adipose tissue with a #15 scalpel blade.  The skin margins were undermined to an appropriate distance in all directions around the primary defect and laterally outward around the island pedicle utilizing iris scissors.  There was minimal undermining beneath the pedicle flap.

## 2023-08-19 NOTE — H&P PST ADULT - NSANTHSNORERD_ENT_A_CORE
.Pt discharged at this time. Discharge instructions and medications reviewed,  Questions were answered. PT verbalized understanding. Follow up appointments were discussed.          Geno Baez  08/19/23 [id number]
No

## 2024-01-18 NOTE — CONSULT NOTE ADULT - REASON FOR ADMISSION
Abdominal pain, cdiff, metastatic disease, SBO
4 = No assist / stand by assistance

## 2024-03-28 NOTE — ED ADULT TRIAGE NOTE - HEIGHT IN CM
Speech Therapy    Patient not seen in therapy.    Treatment on hold due to physician request.      Re-attempt plan: per established plan of care or later today    Additional details:  Per discussion with ENT PA, pt to remain NPO for now due to large intraoral incision. Will follow up for swallow intervention pending ENT recs.      OBJECTIVE                          Therapy procedure time and total treatment time can be found documented on the Time Entry flowsheet   154.94

## (undated) DEVICE — BIOPSY FORCEP COLD DISP

## (undated) DEVICE — ELCTR ECG CONDUCTIVE ADHESIVE

## (undated) DEVICE — GOWN LG

## (undated) DEVICE — FACESHIELD FULL VISOR

## (undated) DEVICE — BIOPSY FORCEP RADIAL JAW 4 STANDARD WITH NEEDLE

## (undated) DEVICE — BITE BLOCK ADULT 20 X 27MM (GREEN)

## (undated) DEVICE — TUBING IV SET GRAVITY 3Y 100" MACRO

## (undated) DEVICE — CATH IV SAFE BC 22G X 1" (BLUE)

## (undated) DEVICE — DRSG BANDAID 0.75X3"

## (undated) DEVICE — CLAMP BX HOT RAD JAW 3

## (undated) DEVICE — BASIN EMESIS 10IN GRADUATED MAUVE

## (undated) DEVICE — CONTAINER FORMALIN 80ML YELLOW

## (undated) DEVICE — LUBRICATING JELLY HR ONE SHOT 3G

## (undated) DEVICE — DRSG 2X2

## (undated) DEVICE — SALIVA EJECTOR (BLUE)

## (undated) DEVICE — DENTURE CUP PINK

## (undated) DEVICE — TUBING MEDI-VAC W MAXIGRIP CONNECTORS 1/4"X6'

## (undated) DEVICE — PACK IV START WITH CHG

## (undated) DEVICE — DRSG CURITY GAUZE SPONGE 4 X 4" 12-PLY NON-STERILE

## (undated) DEVICE — UNDERPAD LINEN SAVER 17 X 24"

## (undated) DEVICE — LINE BREATHE SAMPLNG